# Patient Record
Sex: FEMALE | Race: WHITE | NOT HISPANIC OR LATINO | Employment: FULL TIME | URBAN - METROPOLITAN AREA
[De-identification: names, ages, dates, MRNs, and addresses within clinical notes are randomized per-mention and may not be internally consistent; named-entity substitution may affect disease eponyms.]

---

## 2016-03-31 LAB — CRC RECOMMENDATION EXT: NORMAL

## 2019-03-06 ENCOUNTER — OFFICE VISIT (OUTPATIENT)
Dept: NEUROLOGY | Facility: CLINIC | Age: 59
End: 2019-03-06
Payer: COMMERCIAL

## 2019-03-06 VITALS
WEIGHT: 229.06 LBS | DIASTOLIC BLOOD PRESSURE: 86 MMHG | BODY MASS INDEX: 42.15 KG/M2 | SYSTOLIC BLOOD PRESSURE: 148 MMHG | HEIGHT: 62 IN | HEART RATE: 81 BPM

## 2019-03-06 DIAGNOSIS — H53.8 BLURRY VISION: ICD-10-CM

## 2019-03-06 DIAGNOSIS — R20.0 NUMBNESS OF LEFT HAND: ICD-10-CM

## 2019-03-06 DIAGNOSIS — G43.109 MIGRAINE WITH AURA AND WITHOUT STATUS MIGRAINOSUS, NOT INTRACTABLE: Primary | ICD-10-CM

## 2019-03-06 PROCEDURE — 99204 OFFICE O/P NEW MOD 45 MIN: CPT | Mod: S$GLB,,, | Performed by: PSYCHIATRY & NEUROLOGY

## 2019-03-06 PROCEDURE — 3008F BODY MASS INDEX DOCD: CPT | Mod: CPTII,S$GLB,, | Performed by: PSYCHIATRY & NEUROLOGY

## 2019-03-06 PROCEDURE — 99999 PR PBB SHADOW E&M-NEW PATIENT-LVL III: CPT | Mod: PBBFAC,,, | Performed by: PSYCHIATRY & NEUROLOGY

## 2019-03-06 PROCEDURE — 99204 PR OFFICE/OUTPT VISIT, NEW, LEVL IV, 45-59 MIN: ICD-10-PCS | Mod: S$GLB,,, | Performed by: PSYCHIATRY & NEUROLOGY

## 2019-03-06 PROCEDURE — 99999 PR PBB SHADOW E&M-NEW PATIENT-LVL III: ICD-10-PCS | Mod: PBBFAC,,, | Performed by: PSYCHIATRY & NEUROLOGY

## 2019-03-06 PROCEDURE — 3008F PR BODY MASS INDEX (BMI) DOCUMENTED: ICD-10-PCS | Mod: CPTII,S$GLB,, | Performed by: PSYCHIATRY & NEUROLOGY

## 2019-03-06 RX ORDER — PROPRANOLOL HYDROCHLORIDE 20 MG/1
20 TABLET ORAL DAILY
Qty: 30 TABLET | Refills: 1 | Status: SHIPPED | OUTPATIENT
Start: 2019-03-06 | End: 2019-12-26

## 2019-03-06 RX ORDER — BUTORPHANOL TARTRATE 10 MG/ML
SPRAY NASAL
Refills: 0 | COMMUNITY
Start: 2019-02-08 | End: 2023-03-23 | Stop reason: ALTCHOICE

## 2019-03-06 RX ORDER — LORAZEPAM 1 MG/1
1 TABLET ORAL ONCE AS NEEDED
Qty: 1 TABLET | Refills: 0 | Status: SHIPPED | OUTPATIENT
Start: 2019-03-06 | End: 2022-06-16 | Stop reason: ALTCHOICE

## 2019-03-06 NOTE — LETTER
March 6, 2019      Dash Rice Jr., MD  [address] [place] [id number]           [facility] [address] [facility] [address]  [facility] [address]  Phone: [phone]  Fax: [phone]          Patient: Makenna Candelaria   MR Number: [id number]   YOB: 1960   Date of Visit: 3/6/2019       Dear Dr. Dash Rice Jr.:    Thank you for referring Makenna Candelaria to me for evaluation. Attached you will find relevant portions of my assessment and plan of care.    If you have questions, please do not hesitate to call me. I look forward to following Makenna Candelaria along with you.    Sincerely,    Christiane Myrick, DO [facility]  CC:  No Recipients    If you would like to receive this communication electronically, please contact [email or web address] or [phone] to request more information on [facility] access.    For providers and/or their staff who would like to refer a patient to [facility], please contact us through our one-stop-shop provider referral line, [facility], at [phone].    If you feel you have received this communication in error or would no longer like to receive these types of communications, please e-mail [email or web address]

## 2019-03-06 NOTE — PROGRESS NOTES
Neurology Consult Note    Chief Complaint: headaches for 14 years    HPI:   Makenna Candelaria is a 59 y.o. female with medical conditions as outlined below who is here for further evaluation of headaches. She states that she began to get headaches approximately 14 years ago. She describes them as gradual onset bifrontal throbbing pain which can get up to a 10/10 pain. She admits to associated blurry vision, photophobia, nausea and vomiting at times with the headaches. She admits to seeing black dots at times with the headache. She states for the past few months, she has been having the headaches daily and has been taking tylenol daily. She state tylenol helps somewhat with the pain. She states she is taking lexapro for anxiety and is tolerating this well. She denies suicidal or homicidal ideation. She thinks she has tried Topamax in the past for headaches and states she is unsure if it helped or not.  She states her sister also gets migraines. She denies a family history of aneurysm. She reports constant numbness in the first three digits of her left hand. She has no further complaints. She denies a history of cardiac problems.     Past Medical History:  Past Medical History:   Diagnosis Date    Breast cancer     Chronic headaches     Hypertension        Past Surgical History:  Past Surgical History:   Procedure Laterality Date    BREAST BIOPSY      BREAST LUMPECTOMY      BUNIONECTOMY       SECTION         Social History:  Social History     Socioeconomic History    Marital status: Single     Spouse name: Not on file    Number of children: Not on file    Years of education: Not on file    Highest education level: Not on file   Social Needs    Financial resource strain: Not on file    Food insecurity - worry: Not on file    Food insecurity - inability: Not on file    Transportation needs - medical: Not on file    Transportation needs - non-medical: Not on file   Occupational History    Not on  file   Tobacco Use    Smoking status: Never Smoker    Smokeless tobacco: Never Used   Substance and Sexual Activity    Alcohol use: Not on file    Drug use: Not on file    Sexual activity: Not on file   Other Topics Concern    Not on file   Social History Narrative    Not on file       Family History:  Family History   Problem Relation Age of Onset    Breast cancer Mother     Breast cancer Maternal Aunt     Breast cancer Maternal Aunt     Ovarian cancer Neg Hx     Colon cancer Neg Hx        Medications:  Current Outpatient Medications   Medication Sig Dispense Refill    butorphanol (STADOL) 10 mg/mL nasal spray ONE SPRAY IN ONE NOSTRIL EVERY 8-12 HOURS IF FIORINAL NOT WORKING FOR HEADACHE  0    ibuprofen (ADVIL,MOTRIN) 800 MG tablet       losartan-hydrochlorothiazide 50-12.5 mg (HYZAAR) 50-12.5 mg per tablet       TAMOXIFEN CITRATE (TAMOXIFEN ORAL) Take by mouth.      TIZANIDINE HCL (ZANAFLEX ORAL) Take by mouth.      HYDROCODONE/ACETAMINOPHEN (NORCO ORAL) Take by mouth.       No current facility-administered medications for this visit.        Allergies:  Review of patient's allergies indicates:   Allergen Reactions    Sulfa (sulfonamide antibiotics)        ROS:  A 12 point review of system was negative aside from pertinent positives and negatives as outlined above.    Physical Exam  Vitals:    03/06/19 1108   BP: (!) 148/86   Pulse: 81       General: well nourished, well developed  Eyes: no scleral icterus   Nose: nasal turbinates intact  Neck: supple, ROM intact  Skin: no rash or ecchymosis  Joints: no swelling or erythema  Cardiac: regular rate and rhythm  Lungs: clear to auscultation bilaterally    Neuro:  Mental status: AAO x 3, no dysarthria, no aphasia, communicating appropriately  CN: PERRL, EOMI, VFF, V1-V3 sensation intact, no facial asymmetry, hearing grossly intact, tongue midline  Motor:   RUE 5/5  RLE 5/5  LUE 5/5  LLE 5/5    Normal bulk and tone    Reflexes: 2+ throughout, toes  equivocal bilaterally  Sensory: intact to light touch throughout  Coordination: no dysmetria on FTN  Gait: steady    Prior Imaging/Labs:  No prior neuroimaging available for review      Assessment and Plan:    59 y.o. female with headaches likely 2/2 migraine with aura .    1. Migraine with aura and without status migrainosus, not intractable  - MRI Brain W WO Contrast given history of possible breast cancer  - Creatinine, serum; Future  -ESR, CRP given headache in pt with age over 50, low clinical suspicion for temporal arteritis at this time  - propranolol (INDERAL) 20 MG tablet; Take 1 tablet (20 mg total) by mouth once daily for migraine  - EKG 12-lead as baseline as starting propranolol, she was advised not to start propranolol until after EKG  - LORazepam (ATIVAN) 1 MG tablet; Take 1 tablet (1 mg total) by mouth once as needed (Take 1 tablet 20-30 min prior to MRI).  Dispense: 1 tablet; Refill: 0 -  She was advised to have someone drive her to MRI as ativan can make her tired.    Side effects of propranolol were explained to patient in detail and she was advised to notify me for worsening symptoms. She was advised to monitor her BP at home and to notify me if she feels lightheaded or if her blood pressure drops. She was advised to notify her PCP she was started on this medication.    2. Blurry vision  - Ambulatory Referral to Ophthalmology    3. Numbness of left hand likely 2/2 carpal tunnel syndrome  - EMG W/ ULTRASOUND AND NERVE CONDUCTION TEST 2 Extremities; Future      Patient was advised to notify me for worsening symptoms. I will see patient back after above workup or sooner if necessary.     Thank you for this consultation.    Christiane Myrick DO  [facility]  [address] [facility] [address]  [place] [address]  [phone]

## 2019-03-07 ENCOUNTER — HOSPITAL ENCOUNTER (OUTPATIENT)
Dept: CARDIOLOGY | Facility: HOSPITAL | Age: 59
Discharge: HOME | End: 2019-03-07
Attending: PSYCHIATRY & NEUROLOGY
Payer: COMMERCIAL

## 2019-03-07 ENCOUNTER — PROCEDURE VISIT (OUTPATIENT)
Dept: NEUROLOGY | Facility: CLINIC | Age: 59
End: 2019-03-07
Payer: COMMERCIAL

## 2019-03-07 VITALS — BODY MASS INDEX: 42.15 KG/M2 | HEIGHT: 62 IN | WEIGHT: 229.06 LBS

## 2019-03-07 DIAGNOSIS — R20.0 NUMBNESS OF LEFT HAND: ICD-10-CM

## 2019-03-07 DIAGNOSIS — G43.109 MIGRAINE WITH AURA AND WITHOUT STATUS MIGRAINOSUS, NOT INTRACTABLE: ICD-10-CM

## 2019-03-07 PROCEDURE — 95886 MUSC TEST DONE W/N TEST COMP: CPT | Mod: S$GLB,,, | Performed by: PSYCHIATRY & NEUROLOGY

## 2019-03-07 PROCEDURE — 95909 PR NERVE CONDUCTION STUDY; 5-6 STUDIES: ICD-10-PCS | Mod: S$GLB,,, | Performed by: PSYCHIATRY & NEUROLOGY

## 2019-03-07 PROCEDURE — 93010 ELECTROCARDIOGRAM REPORT: CPT | Mod: ,,, | Performed by: INTERNAL MEDICINE

## 2019-03-07 PROCEDURE — 95909 NRV CNDJ TST 5-6 STUDIES: CPT | Mod: S$GLB,,, | Performed by: PSYCHIATRY & NEUROLOGY

## 2019-03-07 PROCEDURE — 93005 ELECTROCARDIOGRAM TRACING: CPT

## 2019-03-07 PROCEDURE — 93010 EKG 12-LEAD: ICD-10-PCS | Mod: ,,, | Performed by: INTERNAL MEDICINE

## 2019-03-07 PROCEDURE — 95886 PR EMG COMPLETE, W/ NERVE CONDUCTION STUDIES, 5+ MUSCLES: ICD-10-PCS | Mod: S$GLB,,, | Performed by: PSYCHIATRY & NEUROLOGY

## 2019-03-12 ENCOUNTER — TELEPHONE (OUTPATIENT)
Dept: NEUROLOGY | Facility: CLINIC | Age: 59
End: 2019-03-12

## 2019-03-12 DIAGNOSIS — G43.009 MIGRAINE WITHOUT AURA AND WITHOUT STATUS MIGRAINOSUS, NOT INTRACTABLE: ICD-10-CM

## 2019-03-12 DIAGNOSIS — G43.009 MIGRAINE WITHOUT AURA AND WITHOUT STATUS MIGRAINOSUS, NOT INTRACTABLE: Primary | ICD-10-CM

## 2019-03-12 RX ORDER — KETOROLAC TROMETHAMINE 15.75 MG/1
SPRAY, METERED NASAL
Qty: 5 EACH | Refills: 0 | Status: SHIPPED | OUTPATIENT
Start: 2019-03-12 | End: 2019-03-12 | Stop reason: SDUPTHER

## 2019-03-12 RX ORDER — KETOROLAC TROMETHAMINE 15.75 MG/1
SPRAY, METERED NASAL
Qty: 5 EACH | Refills: 0 | Status: SHIPPED | OUTPATIENT
Start: 2019-03-12 | End: 2019-05-27 | Stop reason: SDUPTHER

## 2019-03-12 NOTE — TELEPHONE ENCOUNTER
Sent to Dr. Martinez, she wants to know if she can take something else because the propranolol is not helping I explained to her she has to give the medication time to work. She mentioned state oil.        ----- Message from Rachelle Tejada sent at 3/12/2019 11:17 AM CDT -----  Contact: self  Pt calling to speak to a nurse regarding bad headache. [phone]

## 2019-03-12 NOTE — TELEPHONE ENCOUNTER
----- Message from Rachelle Tejada sent at 3/12/2019 11:17 AM CDT -----  Contact: self  Pt calling to speak to a nurse regarding bad headache. [phone]

## 2019-03-12 NOTE — TELEPHONE ENCOUNTER
Needs to be sent to [facility].          ----- Message from Jeni Alonso sent at 3/12/2019  1:56 PM CDT -----  Contact: [facility]/ [phone]  Prescription ketorolac (SPRIX) 15.75 mg/[facility]  Is there something else you can call in for patient?  Thank you

## 2019-03-12 NOTE — TELEPHONE ENCOUNTER
Spoke to patient. She has an 8/10 typical migraine. She has tried triptans in the past and state they do not help. She would not like to retry this. I have prescribed her sprix spray. I have advised her to spray in one nostril every 6 hours for severe migraine. She was advised not to use more than 4 times in a day or more than 3 times a week. If she is unable to get this today, I have advised her to take ibuprofen 800mg as needed up to three times a day. She was advised not to do this more than 3 times a week and she was advised not to take ibuprofen and sprix spray in the same day as they work in a similar way. If her headache does not improve, she was advised to try to get an injection with her PCP or go to ED for IV medications. She is in agreement with this plan. Will have staff call her to make a sooner follow up appointment.    Christiane Myrick DO

## 2019-03-22 ENCOUNTER — TELEPHONE (OUTPATIENT)
Dept: NEUROLOGY | Facility: CLINIC | Age: 59
End: 2019-03-22

## 2019-03-22 NOTE — TELEPHONE ENCOUNTER
Patient stopped taking propranolol because she gained weight. Made a sooner appointment.        ----- Message from Elizabeth Archibald sent at 3/22/2019 12:04 PM CDT -----  Contact: pt  Name of Who is Calling: pt      What is the request in detail: pt wants to discuss medications. Call pt      Can the clinic reply by [facility]: no      What Number to Call Back if not in [facility]: [phone]

## 2019-03-28 ENCOUNTER — TELEPHONE (OUTPATIENT)
Dept: OPTOMETRY | Facility: CLINIC | Age: 59
End: 2019-03-28

## 2019-04-03 ENCOUNTER — TELEPHONE (OUTPATIENT)
Dept: NEUROLOGY | Facility: CLINIC | Age: 59
End: 2019-04-03

## 2019-04-03 NOTE — TELEPHONE ENCOUNTER
Returned call LVM to call back.      ----- Message from Erin Grullon sent at 4/3/2019  3:24 PM CDT -----  Contact: Self: [phone]  Patient has a call regarding Medication prescribed to her. Please call to advise, Thank you

## 2019-04-11 DIAGNOSIS — G43.109 MIGRAINE WITH AURA AND WITHOUT STATUS MIGRAINOSUS, NOT INTRACTABLE: ICD-10-CM

## 2019-04-15 ENCOUNTER — TELEPHONE (OUTPATIENT)
Dept: NEUROLOGY | Facility: CLINIC | Age: 59
End: 2019-04-15

## 2019-04-15 ENCOUNTER — OFFICE VISIT (OUTPATIENT)
Dept: NEUROLOGY | Facility: CLINIC | Age: 59
End: 2019-04-15
Payer: COMMERCIAL

## 2019-04-15 VITALS
DIASTOLIC BLOOD PRESSURE: 74 MMHG | HEIGHT: 62 IN | HEART RATE: 84 BPM | SYSTOLIC BLOOD PRESSURE: 160 MMHG | WEIGHT: 233 LBS | BODY MASS INDEX: 42.88 KG/M2

## 2019-04-15 DIAGNOSIS — G43.109 MIGRAINE WITH AURA AND WITHOUT STATUS MIGRAINOSUS, NOT INTRACTABLE: Primary | ICD-10-CM

## 2019-04-15 PROCEDURE — 3008F PR BODY MASS INDEX (BMI) DOCUMENTED: ICD-10-PCS | Mod: CPTII,S$GLB,, | Performed by: PSYCHIATRY & NEUROLOGY

## 2019-04-15 PROCEDURE — 99999 PR PBB SHADOW E&M-EST. PATIENT-LVL III: ICD-10-PCS | Mod: PBBFAC,,, | Performed by: PSYCHIATRY & NEUROLOGY

## 2019-04-15 PROCEDURE — 99214 OFFICE O/P EST MOD 30 MIN: CPT | Mod: S$GLB,,, | Performed by: PSYCHIATRY & NEUROLOGY

## 2019-04-15 PROCEDURE — 3008F BODY MASS INDEX DOCD: CPT | Mod: CPTII,S$GLB,, | Performed by: PSYCHIATRY & NEUROLOGY

## 2019-04-15 PROCEDURE — 99999 PR PBB SHADOW E&M-EST. PATIENT-LVL III: CPT | Mod: PBBFAC,,, | Performed by: PSYCHIATRY & NEUROLOGY

## 2019-04-15 PROCEDURE — 99214 PR OFFICE/OUTPT VISIT, EST, LEVL IV, 30-39 MIN: ICD-10-PCS | Mod: S$GLB,,, | Performed by: PSYCHIATRY & NEUROLOGY

## 2019-04-15 RX ORDER — TOPIRAMATE 25 MG/1
TABLET ORAL
Qty: 60 TABLET | Refills: 1 | Status: SHIPPED | OUTPATIENT
Start: 2019-04-15 | End: 2019-05-27 | Stop reason: SDUPTHER

## 2019-04-15 NOTE — PROGRESS NOTES
Neurology Follow Up Note    Chief Complaint: follow up for migraines    Interval History:  Since last visit, patient reports having her typical migraines a few times a week. She states they can get up to a 10/10 pain. She tried propranolol for migraines, but stopped this as she felt she was gaining weight on this medication. She has not had her MRI as of yet due to cost. She has no further complaints.    Initial Visit 3/6/19:  Makenna Candelaria is a 59 y.o. female with medical conditions as outlined below who is here for further evaluation of headaches. She states that she began to get headaches approximately 14 years ago. She describes them as gradual onset bifrontal throbbing pain which can get up to a 10/10 pain. She admits to associated blurry vision, photophobia, nausea and vomiting at times with the headaches. She admits to seeing black dots at times with the headache. She states for the past few months, she has been having the headaches daily and has been taking tylenol daily. She state tylenol helps somewhat with the pain. She states she is taking lexapro for anxiety and is tolerating this well. She denies suicidal or homicidal ideation. She thinks she has tried Topamax in the past for headaches and states she is unsure if it helped or not.  She states her sister also gets migraines. She denies a family history of aneurysm. She reports constant numbness in the first three digits of her left hand. She has no further complaints. She denies a history of cardiac problems.        Past Medical History:  Past Medical History:   Diagnosis Date    Breast cancer     Chronic headaches     Hypertension        Past Surgical History:  Past Surgical History:   Procedure Laterality Date    BREAST BIOPSY      BREAST LUMPECTOMY      BUNIONECTOMY       SECTION         Social History:  Social History     Socioeconomic History    Marital status: Single     Spouse name: Not on file    Number of children: Not on  file    Years of education: Not on file    Highest education level: Not on file   Occupational History    Not on file   Social Needs    Financial resource strain: Not on file    Food insecurity:     Worry: Not on file     Inability: Not on file    Transportation needs:     Medical: Not on file     Non-medical: Not on file   Tobacco Use    Smoking status: Never Smoker    Smokeless tobacco: Never Used   Substance and Sexual Activity    Alcohol use: Not on file    Drug use: Not on file    Sexual activity: Not on file   Lifestyle    Physical activity:     Days per week: Not on file     Minutes per session: Not on file    Stress: Not on file   Relationships    Social connections:     Talks on phone: Not on file     Gets together: Not on file     Attends Alevism service: Not on file     Active member of club or organization: Not on file     Attends meetings of clubs or organizations: Not on file     Relationship status: Not on file   Other Topics Concern    Not on file   Social History Narrative    Not on file       Family History:  Family History   Problem Relation Age of Onset    Breast cancer Mother     Breast cancer Maternal Aunt     Breast cancer Maternal Aunt     Ovarian cancer Neg Hx     Colon cancer Neg Hx        Medications:  Current Outpatient Medications   Medication Sig Dispense Refill    butorphanol (STADOL) 10 mg/mL nasal spray ONE SPRAY IN ONE NOSTRIL EVERY 8-12 HOURS IF FIORINAL NOT WORKING FOR HEADACHE  0    HYDROCODONE/ACETAMINOPHEN (NORCO ORAL) Take by mouth.      ketorolac (SPRIX) 15.75 mg/spray Spry Spray in 1 nostril every 6 hours as needed for severe migraine. Do not exceed 4 times a day or more than 3 times a week 5 each 0    losartan-hydrochlorothiazide 50-12.5 mg (HYZAAR) 50-12.5 mg per tablet       propranolol (INDERAL) 20 MG tablet Take 1 tablet (20 mg total) by mouth once daily. 30 tablet 1    TAMOXIFEN CITRATE (TAMOXIFEN ORAL) Take by mouth.      TIZANIDINE HCL  (ZANAFLEX ORAL) Take by mouth.      fremanezumab-vfrm 225 mg/1.5 mL Syrg Inject 225 mg into the skin every 28 days. 1.5 mL 3    LORazepam (ATIVAN) 1 MG tablet Take 1 tablet (1 mg total) by mouth once as needed (Take 1 tablet 20-30 min prior to MRI). 1 tablet 0     No current facility-administered medications for this visit.        Allergies:  Review of patient's allergies indicates:   Allergen Reactions    Sulfa (sulfonamide antibiotics)        ROS:  A 12 point review of system was negative aside from pertinent positives and negatives as outlined above.    Physical Exam  Vitals:    04/15/19 1416   BP: (!) 160/74   Pulse: 84       General: well nourished, well developed  Eyes: no scleral icterus   Nose: nasal turbinates intact  Neck: supple, ROM intact  Skin: no rash or ecchymosis  Joints: no swelling or erythema  Cardiac: regular rate and rhythm  Lungs: clear to auscultation bilaterally    Neuro:  Mental status: AAO x 3, no dysarthria, no aphasia, communicating appropriately  CN: PERRL, EOMI, VFF, V1-V3 sensation intact, no facial asymmetry, hearing grossly intact, tongue midline  Motor:   RUE 5/5  RLE 5/5  LUE 5/5  LLE 5/5    Normal bulk and tone    Reflexes: 2+ throughout, toes equivocal bilaterally  Sensory: intact to light touch throughout  Coordination: no dysmetria on FTN  Gait: steady    Prior Imaging/Labs:  No recent neuroimaging      Assessment and Plan:    59 y.o. female with migraines    1. Migraine with aura and without status migrainosus, not intractable    - We discussed trying topamax to see if this helps. She was advised to take 1 tab at bedtime for a week and if tolerating well increase to 2 tabs at bedtime. She denies a history of kidney stones  She was made aware of side effects of topamax and was advised to notify me if she experiences any. If she does not notice a difference in her headaches in 3 weeks, we discussed trying fremanezumab(ajovy) injections. She was made aware of side effects of  this medication as well and was advised to notify me if she experiences any.  - fremanezumab-vfrm 225 mg/1.5 mL Syrg; Inject 225 mg into the skin every 28 days.  Dispense: 1.5 mL; Refill: 3        Patient was advised to notify me for worsening symptoms. I will see patient back in 6 weeks or sooner if necessary.     Christiane Myrick DO  [facility]  [address] [facility] [address]  [place] [address]  [phone]

## 2019-04-15 NOTE — TELEPHONE ENCOUNTER
Made an appt.        ----- Message from Rachael pierooliverio sent at 4/12/2019  4:46 PM CDT -----  Contact: self  Type: Patient Call Back    Who called:pt     What is the request in detail: Pt asking for a call back regarding her medications. Pt declined to leave details.     Can the clinic reply by [facility]? No     Would the patient rather a call back or a response via [facility]? Call back     Best call back number:[phone]

## 2019-04-17 ENCOUNTER — TELEPHONE (OUTPATIENT)
Dept: PHARMACY | Facility: CLINIC | Age: 59
End: 2019-04-17

## 2019-04-18 NOTE — TELEPHONE ENCOUNTER
Matty prior authorization has been approved through 04/17/2020 . Patient's insurance requires the patient to fill through [facility]. Please send prescription to [facility], which has been added to the patients EPIC profile. Patient has been notified and provided with the necessary info to call and schedule a delivery.    To complete this in EPIC, the original order MUST be discontinued and re-typed as a new prescription with the updated pharmacy listed. Clicking reorder will continue to route the rx to [facility] even if the pharmacy is changed. Please opt the patient out of [facility] when the BPA is fired.    DOCUMENTATION ONLY:  Financial Assistance for Ajovy approved.  Source: [facility] Copay Card  Bin: [id number]  PCN: CN  ID: [id number]  Group: [id number]  Copay: $0     -ARR

## 2019-04-22 DIAGNOSIS — G43.109 MIGRAINE WITH AURA AND WITHOUT STATUS MIGRAINOSUS, NOT INTRACTABLE: ICD-10-CM

## 2019-04-22 RX ORDER — PROPRANOLOL HYDROCHLORIDE 20 MG/1
20 TABLET ORAL DAILY
Qty: 30 TABLET | Refills: 1 | Status: CANCELLED | OUTPATIENT
Start: 2019-04-22

## 2019-04-26 ENCOUNTER — TELEPHONE (OUTPATIENT)
Dept: NEUROLOGY | Facility: CLINIC | Age: 59
End: 2019-04-26

## 2019-04-26 NOTE — TELEPHONE ENCOUNTER
Called in fremanezumab-vfrm 225 mg/1.5 mL Syrg to [facility].        ----- Message from Kimi Granados sent at 4/26/2019  1:23 PM CDT -----  Contact: self  [phone]  Type: Patient Call Back    Who called: Pt    What is the request in detail:      Pt requesting to speak to you regarding the script for her injection  For her headaches. [facility] can order it for her is that can get the script sent to them by 2:00 today    [facility] in [place], [phone]    I red flagged this because she needs her meds filled asap    Thanks........Beatriz

## 2019-05-08 DIAGNOSIS — G43.109 MIGRAINE WITH AURA AND WITHOUT STATUS MIGRAINOSUS, NOT INTRACTABLE: ICD-10-CM

## 2019-05-09 DIAGNOSIS — G43.109 MIGRAINE WITH AURA AND WITHOUT STATUS MIGRAINOSUS, NOT INTRACTABLE: ICD-10-CM

## 2019-05-26 NOTE — PROCEDURES
Neurology EMG/NCS Note    Patient presents for EMG/NCS for left hand paresthesias.    Physical Exam  Deltoid R 5/5 L 5/5   Biceps R 5/5 L 5/5   Triceps R 5/5 L 5/5   Wrist flexion R 5/5 L 5/5   Wrist extension R 5/5 L 5/5   Finger abduction  R 5/5 L 5/5   Thumb abduction R 5/5 L 5/5     Hip flexion R 5/5 L 5/5   Knee extension R 5/5 L 5/5   Knee flexion R 5/5 L 5/5   Foot dorsiflexion R 5/5 L 5/5   Foot plantar flexion R 5/5 L 5/5   Foot inversion R 5/5 L 5/5   Foot eversion R 5/5 L 5/5       EMG was performed. Please see scanned EMG/NCS report for further details.    Conclusion:   This is an abnormal EMG/NCS study  1) The absent left median sensory response and severely reduced left median motor CMAP amplitudes could be secondary to a severe median neuropathy at the wrist on the left (severe left carpal tunnel syndrome) vs technical difficulty. This is a nondiagnostic study.     Clinical Impression:  Keeping clinical picture in mind, findings are atypical and further studies, i.e. MRI wrist would have to be considered to clarify diagnosis.    Patient may have some milder degree of carpal tunnel syndrome, but given lack of weakness on exam and severity of findings on NCS, would recommend further studies to evaluate for carpal tunnel syndrome    We discussed a referral to orthopaedics for consideration of steroid injections to see if this helps with symptoms, however, patient would like to hold off at this time.    Christiane Myrick DO

## 2019-05-27 ENCOUNTER — OFFICE VISIT (OUTPATIENT)
Dept: NEUROLOGY | Facility: CLINIC | Age: 59
End: 2019-05-27
Payer: COMMERCIAL

## 2019-05-27 VITALS
DIASTOLIC BLOOD PRESSURE: 81 MMHG | BODY MASS INDEX: 42.03 KG/M2 | HEIGHT: 62 IN | WEIGHT: 228.38 LBS | HEART RATE: 75 BPM | SYSTOLIC BLOOD PRESSURE: 131 MMHG

## 2019-05-27 DIAGNOSIS — G43.109 MIGRAINE WITH AURA AND WITHOUT STATUS MIGRAINOSUS, NOT INTRACTABLE: ICD-10-CM

## 2019-05-27 DIAGNOSIS — G43.009 MIGRAINE WITHOUT AURA AND WITHOUT STATUS MIGRAINOSUS, NOT INTRACTABLE: ICD-10-CM

## 2019-05-27 PROBLEM — G43.909 MIGRAINES: Status: ACTIVE | Noted: 2019-05-27

## 2019-05-27 PROCEDURE — 99999 PR PBB SHADOW E&M-EST. PATIENT-LVL III: ICD-10-PCS | Mod: PBBFAC,,, | Performed by: PSYCHIATRY & NEUROLOGY

## 2019-05-27 PROCEDURE — 99214 OFFICE O/P EST MOD 30 MIN: CPT | Mod: S$GLB,,, | Performed by: PSYCHIATRY & NEUROLOGY

## 2019-05-27 PROCEDURE — 99214 PR OFFICE/OUTPT VISIT, EST, LEVL IV, 30-39 MIN: ICD-10-PCS | Mod: S$GLB,,, | Performed by: PSYCHIATRY & NEUROLOGY

## 2019-05-27 PROCEDURE — 99999 PR PBB SHADOW E&M-EST. PATIENT-LVL III: CPT | Mod: PBBFAC,,, | Performed by: PSYCHIATRY & NEUROLOGY

## 2019-05-27 PROCEDURE — 3008F PR BODY MASS INDEX (BMI) DOCUMENTED: ICD-10-PCS | Mod: CPTII,S$GLB,, | Performed by: PSYCHIATRY & NEUROLOGY

## 2019-05-27 PROCEDURE — 3008F BODY MASS INDEX DOCD: CPT | Mod: CPTII,S$GLB,, | Performed by: PSYCHIATRY & NEUROLOGY

## 2019-05-27 RX ORDER — TOPIRAMATE 25 MG/1
TABLET ORAL
Qty: 90 TABLET | Refills: 1 | Status: SHIPPED | OUTPATIENT
Start: 2019-05-27 | End: 2019-07-23 | Stop reason: SDUPTHER

## 2019-05-27 RX ORDER — KETOROLAC TROMETHAMINE 15.75 MG/1
SPRAY, METERED NASAL
Qty: 5 EACH | Refills: 0 | Status: SHIPPED | OUTPATIENT
Start: 2019-05-27 | End: 2019-07-03 | Stop reason: SDUPTHER

## 2019-05-27 NOTE — PROGRESS NOTES
Neurology Follow Up Note    Chief Complaint: migraines and left hand paresthesias    HPI:   Makenna Candelaria is a 59 y.o. female with migraines who presents for follow up appointment. She reports that since last visit, her headaches have improved with the ajovy injections. She states after the injection, she did not have a headache for 2 weeks, however, for the past two weeks she has been getting headaches daily. She describes them as gradual onset throbbing holocephalic headaches associated with photophobia and phonophobia. These headaches are usually a 6/10. She states topamax has helped her nighttime headaches, but she still has headaches during the day. She is tolerating topamax well. She states she has left hand paresthesias, but is tolerating it well. I have advised her to get an MRI of the left wrist to further evaluate for carpal tunnel syndrome given inconclusive EMG, however, she states she would like to hold off on this at this time. She has no further complaints.     Past Medical History:  Past Medical History:   Diagnosis Date    Breast cancer     Chronic headaches     Hypertension        Past Surgical History:  Past Surgical History:   Procedure Laterality Date    BREAST BIOPSY      BREAST LUMPECTOMY      BUNIONECTOMY       SECTION         Social History:  Social History     Socioeconomic History    Marital status: Single     Spouse name: Not on file    Number of children: Not on file    Years of education: Not on file    Highest education level: Not on file   Occupational History    Not on file   Social Needs    Financial resource strain: Not on file    Food insecurity:     Worry: Not on file     Inability: Not on file    Transportation needs:     Medical: Not on file     Non-medical: Not on file   Tobacco Use    Smoking status: Never Smoker    Smokeless tobacco: Never Used   Substance and Sexual Activity    Alcohol use: Not on file    Drug use: Not on file    Sexual  activity: Not on file   Lifestyle    Physical activity:     Days per week: Not on file     Minutes per session: Not on file    Stress: Not on file   Relationships    Social connections:     Talks on phone: Not on file     Gets together: Not on file     Attends Confucianism service: Not on file     Active member of club or organization: Not on file     Attends meetings of clubs or organizations: Not on file     Relationship status: Not on file   Other Topics Concern    Not on file   Social History Narrative    Not on file       Family History:  Family History   Problem Relation Age of Onset    Breast cancer Mother     Breast cancer Maternal Aunt     Breast cancer Maternal Aunt     Ovarian cancer Neg Hx     Colon cancer Neg Hx        Medications:  Current Outpatient Medications   Medication Sig Dispense Refill    butorphanol (STADOL) 10 mg/mL nasal spray ONE SPRAY IN ONE NOSTRIL EVERY 8-12 HOURS IF FIORINAL NOT WORKING FOR HEADACHE  0    fremanezumab-vfrm 225 mg/1.5 mL Syrg Inject 225 mg into the skin every 28 days. 1.5 mL 3    losartan-hydrochlorothiazide 50-12.5 mg (HYZAAR) 50-12.5 mg per tablet       TAMOXIFEN CITRATE (TAMOXIFEN ORAL) Take by mouth.      topiramate (TOPAMAX) 25 MG tablet Take 1 tab in the morning and 2 tabs at bedtime 90 tablet 1    ketorolac (SPRIX) 15.75 mg/spray Spry Spray in 1 nostril every 6 hours as needed for severe migraine. Do not exceed 4 times a day or more than 3 times a week 5 each 0    LORazepam (ATIVAN) 1 MG tablet Take 1 tablet (1 mg total) by mouth once as needed (Take 1 tablet 20-30 min prior to MRI). 1 tablet 0    propranolol (INDERAL) 20 MG tablet Take 1 tablet (20 mg total) by mouth once daily. 30 tablet 1     No current facility-administered medications for this visit.        Allergies:  Review of patient's allergies indicates:   Allergen Reactions    Sulfa (sulfonamide antibiotics)        ROS:  A 12 point review of system was negative aside from pertinent  positives and negatives as outlined above.    Physical Exam  Vitals:    05/27/19 1424   BP: 131/81   Pulse: 75       General: well nourished, well developed  Eyes: no scleral icterus   Nose: nasal turbinates intact  Neck: supple, ROM intact  Skin: no rash or ecchymosis  Joints: no swelling or erythema  Cardiac: regular rate and rhythm  Lungs: clear to auscultation bilaterally    Neuro:  Mental status: AAO x 3, no dysarthria, no aphasia, communicating appropriately  CN: PERRL, EOMI, VFF, V1-V3 sensation intact, no facial asymmetry, hearing grossly intact, tongue midline  Motor:   RUE 5/5  RLE 5/5  LUE 5/5  LLE 5/5    Normal bulk and tone    Reflexes: 2+ throughout, toes equivocal bilaterally  Sensory: intact to light touch throughout  Coordination: no dysmetria on FTN  Gait: steady    Prior Imaging/Labs:  No recent neuroimaging      Assessment and Plan:    59 y.o. female with migraines    1. Migraine with aura and without status migrainosus, not intractable  Increase topamax to 25mg in the morning and 50mg at bedtime  She was advised to notify me if she experiences side effects to topamax with this increase  - ketorolac (SPRIX) 15.75 mg/spray Spry; Spray in 1 nostril every 6 hours as needed for severe migraine. Do not exceed 4 times a day or more than 3 times a week  Dispense: 5 each; Refill: 0. She was advised not to take ibuprofen on the days she uses sprix spray as they work in a similar manner. She denies stomach problems or problems with bleeding.  She was advised to notify me if she experiences side effects to sprix spray  C/w ajovy injections once a month  MRI brain w/wo contrast            Patient was advised to notify me for worsening symptoms. I will see patient back in 2 months or sooner if necessary.     Christiane Myrick DO  [facility]  [address] [facility] [address]  [place] [address] [phone]

## 2019-07-03 DIAGNOSIS — G43.009 MIGRAINE WITHOUT AURA AND WITHOUT STATUS MIGRAINOSUS, NOT INTRACTABLE: ICD-10-CM

## 2019-07-03 RX ORDER — KETOROLAC TROMETHAMINE 15.75 MG/1
SPRAY, METERED NASAL
Qty: 5 EACH | Refills: 0 | Status: SHIPPED | OUTPATIENT
Start: 2019-07-03 | End: 2019-07-31 | Stop reason: SDUPTHER

## 2019-07-05 ENCOUNTER — TELEPHONE (OUTPATIENT)
Dept: NEUROLOGY | Facility: CLINIC | Age: 59
End: 2019-07-05

## 2019-07-05 NOTE — TELEPHONE ENCOUNTER
Spoke to patient over the phone. She states she had a gradual onset right sided throbbing headache yesterday that got up to a 10/10 pain. She states she had associated photophobia, phonophobia, and nausea but no vomiting. She denies having a fever. She states her headache is now a 7/10 and she is functional. She has not tried anything over the counter and is receiving her sprix tomorrow. I have advised her to take ibuprofen 800mg now and repeat in 8 hours if necessary. She was advised not to take ibuprofen tomorrow if she is going to use the sprix spray. She was advised to go to the ED if she has any significant worsening in her headache and she is in agreement with this plan. I will have staff call her to make her a sooner follow up.    Christiane Myrick DO

## 2019-07-05 NOTE — TELEPHONE ENCOUNTER
----- Message from Sonia Rodrigez sent at 7/5/2019  9:18 AM CDT -----  Contact: Makenna [phone]  Type: Patient Call Back    Who called:Makenna    What is the request in detail: The patient is requesting a call back from the staff. She is having really bad headaches today    Can the clinic reply by [facility]?no    Would the patient rather a call back or a response via [facility]? Call back    Best call back number:[phone]    It started yesterday, she took her meds as she always does, she laid down with a cool rag and took her meds again this morning. It's eased up and she's functional but it's still there. This month has been very stressful for her. The sprix is supposed to be delivered tomorrow

## 2019-07-23 DIAGNOSIS — G43.109 MIGRAINE WITH AURA AND WITHOUT STATUS MIGRAINOSUS, NOT INTRACTABLE: ICD-10-CM

## 2019-07-23 RX ORDER — TOPIRAMATE 25 MG/1
TABLET ORAL
Qty: 90 TABLET | Refills: 1 | Status: SHIPPED | OUTPATIENT
Start: 2019-07-23 | End: 2019-07-31

## 2019-07-26 ENCOUNTER — TELEPHONE (OUTPATIENT)
Dept: NEUROLOGY | Facility: CLINIC | Age: 59
End: 2019-07-26

## 2019-07-26 NOTE — TELEPHONE ENCOUNTER
Returned call LVM to call back.        ----- Message from Scott Acevedo sent at 7/26/2019  8:19 AM CDT -----  Contact: Self  Type: Patient Call Back    Who called:Self    What is the request in detail: Pateint would like to discuss her MRI test ordered    Can the clinic reply by [facility]?no    Would the patient rather a call back or a response via [facility]? call    Best call back number: [phone]

## 2019-07-31 ENCOUNTER — OFFICE VISIT (OUTPATIENT)
Dept: NEUROLOGY | Facility: CLINIC | Age: 59
End: 2019-07-31
Payer: COMMERCIAL

## 2019-07-31 VITALS
HEART RATE: 79 BPM | WEIGHT: 225.75 LBS | HEIGHT: 62 IN | BODY MASS INDEX: 41.54 KG/M2 | SYSTOLIC BLOOD PRESSURE: 146 MMHG | DIASTOLIC BLOOD PRESSURE: 88 MMHG

## 2019-07-31 DIAGNOSIS — G43.009 MIGRAINE WITHOUT AURA AND WITHOUT STATUS MIGRAINOSUS, NOT INTRACTABLE: ICD-10-CM

## 2019-07-31 DIAGNOSIS — G43.109 MIGRAINE WITH AURA AND WITHOUT STATUS MIGRAINOSUS, NOT INTRACTABLE: Primary | ICD-10-CM

## 2019-07-31 DIAGNOSIS — I83.813 VARICOSE VEINS OF BOTH LOWER EXTREMITIES WITH PAIN: ICD-10-CM

## 2019-07-31 PROCEDURE — 3008F PR BODY MASS INDEX (BMI) DOCUMENTED: ICD-10-PCS | Mod: CPTII,S$GLB,, | Performed by: PSYCHIATRY & NEUROLOGY

## 2019-07-31 PROCEDURE — 99214 PR OFFICE/OUTPT VISIT, EST, LEVL IV, 30-39 MIN: ICD-10-PCS | Mod: S$GLB,,, | Performed by: PSYCHIATRY & NEUROLOGY

## 2019-07-31 PROCEDURE — 99214 OFFICE O/P EST MOD 30 MIN: CPT | Mod: S$GLB,,, | Performed by: PSYCHIATRY & NEUROLOGY

## 2019-07-31 PROCEDURE — 99999 PR PBB SHADOW E&M-EST. PATIENT-LVL III: ICD-10-PCS | Mod: PBBFAC,,, | Performed by: PSYCHIATRY & NEUROLOGY

## 2019-07-31 PROCEDURE — 3008F BODY MASS INDEX DOCD: CPT | Mod: CPTII,S$GLB,, | Performed by: PSYCHIATRY & NEUROLOGY

## 2019-07-31 PROCEDURE — 99999 PR PBB SHADOW E&M-EST. PATIENT-LVL III: CPT | Mod: PBBFAC,,, | Performed by: PSYCHIATRY & NEUROLOGY

## 2019-07-31 RX ORDER — TOPIRAMATE 50 MG/1
50 TABLET, FILM COATED ORAL 2 TIMES DAILY
Qty: 60 TABLET | Refills: 1 | Status: SHIPPED | OUTPATIENT
Start: 2019-07-31 | End: 2019-09-24

## 2019-07-31 RX ORDER — ESCITALOPRAM OXALATE 10 MG/1
10 TABLET ORAL 2 TIMES DAILY
COMMUNITY
Start: 2019-07-02 | End: 2022-06-30 | Stop reason: SDUPTHER

## 2019-07-31 RX ORDER — KETOROLAC TROMETHAMINE 15.75 MG/1
SPRAY, METERED NASAL
Qty: 5 EACH | Refills: 5 | Status: SHIPPED | OUTPATIENT
Start: 2019-07-31 | End: 2019-08-12 | Stop reason: SDUPTHER

## 2019-07-31 RX ORDER — HYDROCHLOROTHIAZIDE 25 MG/1
TABLET ORAL
COMMUNITY
Start: 2019-07-02 | End: 2022-06-16 | Stop reason: SDUPTHER

## 2019-07-31 RX ORDER — LOSARTAN POTASSIUM 100 MG/1
TABLET ORAL
COMMUNITY
Start: 2019-07-02 | End: 2022-06-16 | Stop reason: SDUPTHER

## 2019-08-01 ENCOUNTER — TELEPHONE (OUTPATIENT)
Dept: NEUROLOGY | Facility: CLINIC | Age: 59
End: 2019-08-01

## 2019-08-01 DIAGNOSIS — G43.009 MIGRAINE WITHOUT AURA AND WITHOUT STATUS MIGRAINOSUS, NOT INTRACTABLE: Primary | ICD-10-CM

## 2019-08-01 NOTE — TELEPHONE ENCOUNTER
Called patient and notified her of MRI brain results. MRI brain was found to be normal. It shows right maxillary sinus polyps and mild bilateral ethmoid mucosal thickening, however, patient does not have symptoms of sinusitis. If she develops nasal congestion, she was advised to discuss this further with her PCP. All questions were answered.    Christiane Myrick DO

## 2019-08-12 ENCOUNTER — TELEPHONE (OUTPATIENT)
Dept: NEUROLOGY | Facility: CLINIC | Age: 59
End: 2019-08-12

## 2019-08-12 DIAGNOSIS — G43.009 MIGRAINE WITHOUT AURA AND WITHOUT STATUS MIGRAINOSUS, NOT INTRACTABLE: ICD-10-CM

## 2019-08-12 RX ORDER — KETOROLAC TROMETHAMINE 15.75 MG/1
SPRAY, METERED NASAL
Qty: 5 EACH | Refills: 5 | Status: SHIPPED | OUTPATIENT
Start: 2019-08-12 | End: 2020-03-19 | Stop reason: SDUPTHER

## 2019-08-12 NOTE — TELEPHONE ENCOUNTER
----- Message from Elizabeth Archibald sent at 8/12/2019  2:05 PM CDT -----  Contact: pt  Type: RX Refill Request    Who Called:         Refill or New Rx:fremanezumab-vfrm (AJOVY) 225 mg/1.5 mL injection     RX Name and Strength:    How is the patient currently taking it? (ex. 1XDay):    Is this a 30 day or 90 day RX:    Preferred Pharmacy with phone number:[facility]    Local or Mail Order:    Ordering Provider:    Would the patient rather a call back or a response via [facility]? Call back    Best Call Back Number:[phone]    Additional Information:

## 2019-08-12 NOTE — TELEPHONE ENCOUNTER
----- Message from Christiane Myrick DO sent at 8/12/2019  5:24 PM CDT -----  Contact: Self [phone]  I sent the ajovy to "[facility] and sprix to [facility]. Can you let patient know, thanks.  ----- Message -----  From: Rufina Mccullough MA  Sent: 8/12/2019   5:06 PM  To: Christiane Myrick DO        ----- Message -----  From: Zahraa Ramsay  Sent: 8/12/2019   4:35 PM  To: [facility] Staff    Type: Patient Call Back    Who called:Self    What is the request in detail:pt is calling in regards to the medication being sent to the incorrect pharmacies.fremanezumab-vfrm (AJOVY) 225 mg/1.5 mL injection & ketorolac (SPRIX) 15.75 mg/spray Spry. fremanezumab-vfrm (AJOVY) 225 mg/1.5 mL injection is supposed to go to [facility] and not [facility] and [facility] is trying to charge her for the medication and it was not supposed to go there. ketorolac (SPRIX) 15.75 mg/spray Spry is supposed to go to [facility]    Can the clinic reply by [facility]? Call back    Would the patient rather a call back or a response via [facility]? Call back    Best call back number:[phone]      Patient notified

## 2019-09-03 DIAGNOSIS — G43.109 MIGRAINE WITH AURA AND WITHOUT STATUS MIGRAINOSUS, NOT INTRACTABLE: ICD-10-CM

## 2019-09-03 RX ORDER — LORAZEPAM 1 MG/1
TABLET ORAL
Qty: 1 TABLET | OUTPATIENT
Start: 2019-09-03

## 2019-09-06 RX ORDER — PROPRANOLOL HYDROCHLORIDE 20 MG/1
TABLET ORAL
Qty: 30 TABLET | Refills: 1 | OUTPATIENT
Start: 2019-09-06

## 2019-09-24 DIAGNOSIS — G43.109 MIGRAINE WITH AURA AND WITHOUT STATUS MIGRAINOSUS, NOT INTRACTABLE: ICD-10-CM

## 2019-09-24 RX ORDER — TOPIRAMATE 50 MG/1
50 TABLET, FILM COATED ORAL 2 TIMES DAILY
Qty: 60 TABLET | Refills: 3 | Status: SHIPPED | OUTPATIENT
Start: 2019-09-24 | End: 2019-11-25

## 2019-11-20 ENCOUNTER — TELEPHONE (OUTPATIENT)
Dept: NEUROLOGY | Facility: CLINIC | Age: 59
End: 2019-11-20

## 2019-11-20 NOTE — TELEPHONE ENCOUNTER
----- Message from Elizabeth Watts sent at 11/20/2019  1:35 PM CST -----  Contact: self / [phone]  Needs a call back for labs. Please advise       Booked appointment

## 2019-11-25 ENCOUNTER — TELEPHONE (OUTPATIENT)
Dept: NEUROLOGY | Facility: CLINIC | Age: 59
End: 2019-11-25

## 2019-11-25 RX ORDER — TOPIRAMATE 25 MG/1
50 TABLET ORAL 2 TIMES DAILY
Qty: 120 TABLET | Refills: 0 | Status: SHIPPED | OUTPATIENT
Start: 2019-11-25 | End: 2019-12-23

## 2019-11-25 NOTE — TELEPHONE ENCOUNTER
Patient wanted to know where she had her MRI done at told her DIS.        ----- Message from aRch Hobson sent at 11/25/2019  9:24 AM CST -----  Contact: [phone]/self   Patient is requesting to speak with nurse regarding a personal matter. Please advise.

## 2019-12-20 DIAGNOSIS — G43.009 MIGRAINE WITHOUT AURA AND WITHOUT STATUS MIGRAINOSUS, NOT INTRACTABLE: ICD-10-CM

## 2019-12-23 DIAGNOSIS — G43.009 MIGRAINE WITHOUT AURA AND WITHOUT STATUS MIGRAINOSUS, NOT INTRACTABLE: ICD-10-CM

## 2019-12-23 RX ORDER — TOPIRAMATE 25 MG/1
50 TABLET ORAL 2 TIMES DAILY
Qty: 120 TABLET | Refills: 0 | Status: SHIPPED | OUTPATIENT
Start: 2019-12-23 | End: 2020-03-24

## 2019-12-26 ENCOUNTER — OFFICE VISIT (OUTPATIENT)
Dept: NEUROLOGY | Facility: CLINIC | Age: 59
End: 2019-12-26
Payer: COMMERCIAL

## 2019-12-26 VITALS
DIASTOLIC BLOOD PRESSURE: 95 MMHG | HEART RATE: 85 BPM | BODY MASS INDEX: 40.33 KG/M2 | WEIGHT: 219.13 LBS | HEIGHT: 62 IN | SYSTOLIC BLOOD PRESSURE: 193 MMHG

## 2019-12-26 DIAGNOSIS — R20.2 PARESTHESIAS: Primary | ICD-10-CM

## 2019-12-26 DIAGNOSIS — G43.009 MIGRAINE WITHOUT AURA AND WITHOUT STATUS MIGRAINOSUS, NOT INTRACTABLE: ICD-10-CM

## 2019-12-26 DIAGNOSIS — I10 HYPERTENSION, UNSPECIFIED TYPE: ICD-10-CM

## 2019-12-26 PROCEDURE — 99999 PR PBB SHADOW E&M-EST. PATIENT-LVL III: ICD-10-PCS | Mod: PBBFAC,,, | Performed by: PSYCHIATRY & NEUROLOGY

## 2019-12-26 PROCEDURE — 99214 PR OFFICE/OUTPT VISIT, EST, LEVL IV, 30-39 MIN: ICD-10-PCS | Mod: S$GLB,,, | Performed by: PSYCHIATRY & NEUROLOGY

## 2019-12-26 PROCEDURE — 3008F PR BODY MASS INDEX (BMI) DOCUMENTED: ICD-10-PCS | Mod: CPTII,S$GLB,, | Performed by: PSYCHIATRY & NEUROLOGY

## 2019-12-26 PROCEDURE — 3008F BODY MASS INDEX DOCD: CPT | Mod: CPTII,S$GLB,, | Performed by: PSYCHIATRY & NEUROLOGY

## 2019-12-26 PROCEDURE — 99999 PR PBB SHADOW E&M-EST. PATIENT-LVL III: CPT | Mod: PBBFAC,,, | Performed by: PSYCHIATRY & NEUROLOGY

## 2019-12-26 PROCEDURE — 99214 OFFICE O/P EST MOD 30 MIN: CPT | Mod: S$GLB,,, | Performed by: PSYCHIATRY & NEUROLOGY

## 2019-12-26 RX ORDER — TRAMADOL HYDROCHLORIDE 50 MG/1
TABLET ORAL
COMMUNITY
Start: 2019-12-12 | End: 2022-06-16 | Stop reason: ALTCHOICE

## 2019-12-26 RX ORDER — DICLOFENAC SODIUM 75 MG/1
75 TABLET, DELAYED RELEASE ORAL
COMMUNITY
Start: 2019-12-06 | End: 2020-12-05

## 2019-12-26 NOTE — PROGRESS NOTES
Neurology Follow Up Note    Chief Complaint: follow up for migraines    Interval History:  Since last visit, patient feels her headaches are improved on topamax 50mg bid. She states that she has a mild headache daily without any other associated symptoms. She states she will get a typical migraine approximately 1 to 2 times a month. She states sprix spray helps for this. She is also taking ajovy injections which is helping her. She had a recent CBC and CMP which were unremarkable. She continues to have left hand paresthesias, we discussed an MRI wrist to help aid in diagnosis, but patient would like to hold off at this time. She had an Mri brain since last visit which was unremarkable. She has no further complaints.      Last Visit 7/31/19:  Since last visit, patient states she has been getting daily headaches. She states they are a 6/10 pain on average. She feels sprix spray helps her, and she uses this 2 times a week. She describes her headache as gradual onset aching and throbbing pain associated with photophobia, phonophobia, nausea, and visual aura of black dots. She denies vomiting. She denies recent fever. She has tried lamictal and imitrex in the past without relief. She was tried on propranolol, but stopped this as she felt it made her gain weight. She is on topamax 25mg in the morning and 50mg at bedtime and is tolerating this well. She feels topamax is helping her with her mood and maybe a little bit with her headaches. She states she has difficulty sleeping at night, because she is always thinking. She admits to being anxious, but denies suicidal or homicidal ideation. She also started ajovy injections and states this helps for a day or two, but then the headache returns. She has no further complaints. She had an MRI at [facility] yesterday for which I do not have the results as of yet.    Visit 5/27/19:   Makenna Candelaria is a 59 y.o. female with migraines who presents for follow up appointment. She reports  that since last visit, her headaches have improved with the ajovy injections. She states after the injection, she did not have a headache for 2 weeks, however, for the past two weeks she has been getting headaches daily. She describes them as gradual onset throbbing holocephalic headaches associated with photophobia and phonophobia. These headaches are usually a 6/10. She states topamax has helped her nighttime headaches, but she still has headaches during the day. She is tolerating topamax well. She states she has left hand paresthesias, but is tolerating it well. I have advised her to get an MRI of the left wrist to further evaluate for carpal tunnel syndrome given inconclusive EMG, however, she states she would like to hold off on this at this time. She has no further complaints.        Past Medical History:  Past Medical History:   Diagnosis Date    Breast cancer     Chronic headaches     Hypertension        Past Surgical History:  Past Surgical History:   Procedure Laterality Date    BREAST BIOPSY      BREAST LUMPECTOMY      BUNIONECTOMY       SECTION         Social History:  Social History     Socioeconomic History    Marital status: Single     Spouse name: Not on file    Number of children: Not on file    Years of education: Not on file    Highest education level: Not on file   Occupational History    Not on file   Social Needs    Financial resource strain: Not on file    Food insecurity:     Worry: Not on file     Inability: Not on file    Transportation needs:     Medical: Not on file     Non-medical: Not on file   Tobacco Use    Smoking status: Never Smoker    Smokeless tobacco: Never Used   Substance and Sexual Activity    Alcohol use: Not on file    Drug use: Not on file    Sexual activity: Not on file   Lifestyle    Physical activity:     Days per week: Not on file     Minutes per session: Not on file    Stress: Not on file   Relationships    Social connections:     Talks  on phone: Not on file     Gets together: Not on file     Attends Jain service: Not on file     Active member of club or organization: Not on file     Attends meetings of clubs or organizations: Not on file     Relationship status: Not on file   Other Topics Concern    Not on file   Social History Narrative    Not on file       Family History:  Family History   Problem Relation Age of Onset    Breast cancer Mother     Breast cancer Maternal Aunt     Breast cancer Maternal Aunt     Ovarian cancer Neg Hx     Colon cancer Neg Hx        Medications:  Current Outpatient Medications   Medication Sig Dispense Refill    butorphanol (STADOL) 10 mg/mL nasal spray ONE SPRAY IN ONE NOSTRIL EVERY 8-12 HOURS IF FIORINAL NOT WORKING FOR HEADACHE  0    diclofenac (VOLTAREN) 75 MG EC tablet Take 75 mg by mouth.      escitalopram oxalate (LEXAPRO) 10 MG tablet       fremanezumab-vfrm (AJOVY) 225 mg/1.5 mL injection INJECT INTO MUSCLE EVERY 28 DAYS 1.5 mL 3    hydroCHLOROthiazide (HYDRODIURIL) 25 MG tablet       ketorolac (SPRIX) 15.75 mg/spray Spry Spray in 1 nostril every 6 hours as needed for severe migraine. Do not exceed 4 times a day or more than 3 times a week 5 each 5    losartan (COZAAR) 100 MG tablet       losartan-hydrochlorothiazide 50-12.5 mg (HYZAAR) 50-12.5 mg per tablet       propranolol (INDERAL) 20 MG tablet Take 1 tablet (20 mg total) by mouth once daily. 30 tablet 1    TAMOXIFEN CITRATE (TAMOXIFEN ORAL) Take by mouth.      topiramate (TOPAMAX) 25 MG tablet Take 2 tablets (50 mg total) by mouth 2 (two) times daily. 120 tablet 0    traMADol (ULTRAM) 50 mg tablet       LORazepam (ATIVAN) 1 MG tablet Take 1 tablet (1 mg total) by mouth once as needed (Take 1 tablet 20-30 min prior to MRI). 1 tablet 0     No current facility-administered medications for this visit.        Allergies:  Review of patient's allergies indicates:   Allergen Reactions    Sulfa (sulfonamide antibiotics)        ROS:  A  12 point review of system was negative aside from pertinent positives and negatives as outlined above.    Physical Exam  Vitals:    12/26/19 1443   BP: (!) 193/95   Pulse: 85       General: well nourished, well developed  Eyes: no scleral icterus   Nose: nasal turbinates intact  Neck: supple, ROM intact  Skin: no rash or ecchymosis  Joints: no swelling or erythema  Cardiac: regular rate and rhythm  Lungs: clear to auscultation bilaterally    Neuro:  Mental status: AAO x 3, no dysarthria, no aphasia, communicating appropriately  CN: PERRL, EOMI, VFF, V1-V3 sensation intact, no facial asymmetry, hearing grossly intact, tongue midline  Motor:   RUE 5/5  RLE 5/5  LUE 5/5  LLE 5/5    Normal bulk and tone    Reflexes: 2+ throughout, toes equivocal bilaterally  Sensory: intact to light touch throughout  Coordination: no dysmetria on FTN  Gait: steady    Prior Imaging/Labs:  Reviewed      Assessment and Plan:    59 y.o. female with migraine w/aura improved on combination of topamax and ajovy. She is tolerating these medications well    1. Migraine   C/w topamax 50mg bid  C/w ajovy injection once a month  C/w sprix spray as needed for severe migraine      2. Paresthesias  We discussed obtaining an MRI left wrist, but patient would like to hold off at this time  We discussed starting low dose gabapentin, but patient would like to hold off at this time as she is worried about gaining weight      3. HTN  Patient was made aware that her BP is very elevated today. She was advised to check this at home and if running higher than SBP 130s, she was advised to confer with her PCP tomorrow regarding further management. She was made aware of the dangers of elevated BP including stroke    Patient was made aware of side effects of medication(s) prescribed and was advised to notify me if she experiences any.        Patient was advised to notify me for worsening symptoms. I will see patient back in 3 months or sooner if necessary.        Christiane Myrick DO  [facility]  [address] [facility] [address]  [place], [place] [id number] [phone]

## 2019-12-27 ENCOUNTER — TELEPHONE (OUTPATIENT)
Dept: NEUROLOGY | Facility: CLINIC | Age: 59
End: 2019-12-27

## 2019-12-27 NOTE — TELEPHONE ENCOUNTER
----- Message from Scott Boydaux sent at 12/27/2019 11:55 AM CST -----  Contact: [facility]  Type: RX Refill Request    Who Called:  Self    Have you contacted your pharmacy: yes    Refill or New Rx: refill     RX Name and Strength: fremanezumab-vfrm (AJOVY) 225 mg/1.5 mL injection    Preferred Pharmacy with phone number: [facility] [place] [address] [phone] (Phone)  [phone] (Fax)    Local or Mail Order: local     Ordering Provider: Michelle    Would the patient rather a call back or a response via [facility]? Call     Best Call Back Number: [phone]    Gave refill to Ed   Additional Information:  Request faxed on yesterday as well

## 2020-03-04 RX ORDER — TOPIRAMATE 25 MG/1
TABLET ORAL
Qty: 90 TABLET | OUTPATIENT
Start: 2020-03-04

## 2020-03-19 DIAGNOSIS — G43.009 MIGRAINE WITHOUT AURA AND WITHOUT STATUS MIGRAINOSUS, NOT INTRACTABLE: ICD-10-CM

## 2020-03-19 RX ORDER — KETOROLAC TROMETHAMINE 15.75 MG/1
SPRAY, METERED NASAL
Qty: 5 EACH | Refills: 5 | Status: SHIPPED | OUTPATIENT
Start: 2020-03-19 | End: 2020-11-19 | Stop reason: SDUPTHER

## 2020-03-24 RX ORDER — TOPIRAMATE 25 MG/1
50 TABLET ORAL 2 TIMES DAILY
Qty: 120 TABLET | Refills: 0 | Status: SHIPPED | OUTPATIENT
Start: 2020-03-24 | End: 2020-04-21

## 2020-04-03 ENCOUNTER — TELEPHONE (OUTPATIENT)
Dept: NEUROLOGY | Facility: CLINIC | Age: 60
End: 2020-04-03

## 2020-04-03 NOTE — TELEPHONE ENCOUNTER
fremanezumab-rm (AJOVY) 225 mg/1.5 mL injection CaseId:[id number];Status:Approved;Review Type:Prior Auth;Coverage Start Date:03/04/2020;Coverage End Date:04/03/2021;Faxed info to pharm.

## 2020-04-14 DIAGNOSIS — G43.009 MIGRAINE WITHOUT AURA AND WITHOUT STATUS MIGRAINOSUS, NOT INTRACTABLE: ICD-10-CM

## 2020-04-14 RX ORDER — FREMANEZUMAB-VFRM 225 MG/1.5ML
INJECTION SUBCUTANEOUS
Qty: 1.5 ML | Refills: 3 | Status: SHIPPED | OUTPATIENT
Start: 2020-04-14 | End: 2020-09-01 | Stop reason: SDUPTHER

## 2020-04-21 RX ORDER — TOPIRAMATE 25 MG/1
50 TABLET ORAL 2 TIMES DAILY
Qty: 120 TABLET | Refills: 0 | Status: SHIPPED | OUTPATIENT
Start: 2020-04-21 | End: 2020-08-05 | Stop reason: SDUPTHER

## 2020-07-23 ENCOUNTER — TELEPHONE (OUTPATIENT)
Dept: NEUROLOGY | Facility: CLINIC | Age: 60
End: 2020-07-23

## 2020-07-23 NOTE — TELEPHONE ENCOUNTER
Returned call no answer.              ----- Message from Gill Moreno sent at 7/23/2020 11:11 AM CDT -----  Type: Patient Call Back    Who called:self    What is the request in detail:pt was a pt of Dr Martinez and has medication that need to be filled    Can the clinic reply by [facility]?no    Would the patient rather a call back or a response via [facility]? call    Best call back number:

## 2020-08-05 RX ORDER — TOPIRAMATE 50 MG/1
50 TABLET, FILM COATED ORAL 2 TIMES DAILY
Qty: 60 TABLET | Refills: 2 | Status: SHIPPED | OUTPATIENT
Start: 2020-08-05 | End: 2020-08-18

## 2020-08-05 NOTE — TELEPHONE ENCOUNTER
Sent to Dr. Castellanos.        ----- Message from Brigette Durant sent at 8/5/2020 10:58 AM CDT -----  Regarding: refill  Can the clinic reply in [facility]: no      Please refill the medication(s) listed below. Please call the patient when the prescription(s) is ready for  at this phone number   [phone]      Medication #1 topiramate (TOPAMAX) 25 MG tablet    Medication #2       [facility]

## 2020-08-18 ENCOUNTER — OFFICE VISIT (OUTPATIENT)
Dept: NEUROLOGY | Facility: CLINIC | Age: 60
End: 2020-08-18
Payer: COMMERCIAL

## 2020-08-18 VITALS
WEIGHT: 227 LBS | SYSTOLIC BLOOD PRESSURE: 161 MMHG | BODY MASS INDEX: 41.77 KG/M2 | HEIGHT: 62 IN | DIASTOLIC BLOOD PRESSURE: 78 MMHG | HEART RATE: 80 BPM

## 2020-08-18 DIAGNOSIS — G43.009 MIGRAINE WITHOUT AURA AND WITHOUT STATUS MIGRAINOSUS, NOT INTRACTABLE: Primary | ICD-10-CM

## 2020-08-18 DIAGNOSIS — G56.02 CARPAL TUNNEL SYNDROME OF LEFT WRIST: ICD-10-CM

## 2020-08-18 PROCEDURE — 3077F SYST BP >= 140 MM HG: CPT | Mod: CPTII,S$GLB,, | Performed by: NEUROLOGICAL SURGERY

## 2020-08-18 PROCEDURE — 3008F PR BODY MASS INDEX (BMI) DOCUMENTED: ICD-10-PCS | Mod: CPTII,S$GLB,, | Performed by: NEUROLOGICAL SURGERY

## 2020-08-18 PROCEDURE — 99214 OFFICE O/P EST MOD 30 MIN: CPT | Mod: S$GLB,,, | Performed by: NEUROLOGICAL SURGERY

## 2020-08-18 PROCEDURE — 99214 PR OFFICE/OUTPT VISIT, EST, LEVL IV, 30-39 MIN: ICD-10-PCS | Mod: S$GLB,,, | Performed by: NEUROLOGICAL SURGERY

## 2020-08-18 PROCEDURE — 99999 PR PBB SHADOW E&M-EST. PATIENT-LVL III: ICD-10-PCS | Mod: PBBFAC,,, | Performed by: NEUROLOGICAL SURGERY

## 2020-08-18 PROCEDURE — 99999 PR PBB SHADOW E&M-EST. PATIENT-LVL III: CPT | Mod: PBBFAC,,, | Performed by: NEUROLOGICAL SURGERY

## 2020-08-18 PROCEDURE — 3078F DIAST BP <80 MM HG: CPT | Mod: CPTII,S$GLB,, | Performed by: NEUROLOGICAL SURGERY

## 2020-08-18 PROCEDURE — 3077F PR MOST RECENT SYSTOLIC BLOOD PRESSURE >= 140 MM HG: ICD-10-PCS | Mod: CPTII,S$GLB,, | Performed by: NEUROLOGICAL SURGERY

## 2020-08-18 PROCEDURE — 3078F PR MOST RECENT DIASTOLIC BLOOD PRESSURE < 80 MM HG: ICD-10-PCS | Mod: CPTII,S$GLB,, | Performed by: NEUROLOGICAL SURGERY

## 2020-08-18 PROCEDURE — 3008F BODY MASS INDEX DOCD: CPT | Mod: CPTII,S$GLB,, | Performed by: NEUROLOGICAL SURGERY

## 2020-08-18 RX ORDER — TOPIRAMATE 100 MG/1
100 CAPSULE, EXTENDED RELEASE ORAL DAILY
Qty: 30 CAPSULE | Refills: 11 | Status: SHIPPED | OUTPATIENT
Start: 2020-08-18 | End: 2021-08-06

## 2020-08-18 NOTE — PROGRESS NOTES
Chief Complaint   Patient presents with    Migraine        Makenna Candelaria is a 60 y.o. female with a history of multiple medical diagnoses as listed below that presents for evaluation of migraines and suspected carpal tunnel syndrome.  Patient has been taking her Topamax 50 mg twice a day as well as taking a job the monthly.  Headaches seem to be getting worse with increasing frequency, increasing intensity, and longer duration.  Her headaches have been not as responsive to control or lack nasal spray compared to when she was last seen in clinic.  She has not had any significant improvement in the numbness and tingling in her left fingers.  She feels that her right side was the same when she was diagnosed with carpal tunnel syndrome and had carpal tunnel release surgery.  Previous nerve conduction study was inconclusive on the left side.     PAST MEDICAL HISTORY:  Past Medical History:   Diagnosis Date    Breast cancer     Chronic headaches     Hypertension        PAST SURGICAL HISTORY:  Past Surgical History:   Procedure Laterality Date    BREAST BIOPSY      BREAST LUMPECTOMY      BUNIONECTOMY       SECTION         SOCIAL HISTORY:  Social History     Socioeconomic History    Marital status: Single     Spouse name: Not on file    Number of children: Not on file    Years of education: Not on file    Highest education level: Not on file   Occupational History    Not on file   Social Needs    Financial resource strain: Not on file    Food insecurity     Worry: Not on file     Inability: Not on file    Transportation needs     Medical: Not on file     Non-medical: Not on file   Tobacco Use    Smoking status: Never Smoker    Smokeless tobacco: Never Used   Substance and Sexual Activity    Alcohol use: Not on file    Drug use: Not on file    Sexual activity: Not on file   Lifestyle    Physical activity     Days per week: Not on file     Minutes per session: Not on file    Stress: Not on  file   Relationships    Social connections     Talks on phone: Not on file     Gets together: Not on file     Attends Church service: Not on file     Active member of club or organization: Not on file     Attends meetings of clubs or organizations: Not on file     Relationship status: Not on file   Other Topics Concern    Not on file   Social History Narrative    Not on file       FAMILY HISTORY:  Family History   Problem Relation Age of Onset    Breast cancer Mother     Breast cancer Maternal Aunt     Breast cancer Maternal Aunt     Ovarian cancer Neg Hx     Colon cancer Neg Hx        ALLERGIES AND MEDICATIONS: updated and reviewed.  Review of patient's allergies indicates:   Allergen Reactions    Sulfa (sulfonamide antibiotics)      Current Outpatient Medications   Medication Sig Dispense Refill    AJOVY 225 mg/1.5 mL injection INJECT INTO MUSCLE EVERY 28 DAYS 1.5 mL 3    diclofenac (VOLTAREN) 75 MG EC tablet Take 75 mg by mouth.      escitalopram oxalate (LEXAPRO) 10 MG tablet       hydroCHLOROthiazide (HYDRODIURIL) 25 MG tablet       ketorolac (SPRIX) 15.75 mg/spray Spry Spray in 1 nostril every 6 hours as needed for severe migraine. Do not exceed 4 times a day or more than 3 times a week 5 each 5    losartan (COZAAR) 100 MG tablet       losartan-hydrochlorothiazide 50-12.5 mg (HYZAAR) 50-12.5 mg per tablet       TAMOXIFEN CITRATE (TAMOXIFEN ORAL) Take by mouth.      traMADol (ULTRAM) 50 mg tablet       butorphanol (STADOL) 10 mg/mL nasal spray ONE SPRAY IN ONE NOSTRIL EVERY 8-12 HOURS IF FIORINAL NOT WORKING FOR HEADACHE  0    LORazepam (ATIVAN) 1 MG tablet Take 1 tablet (1 mg total) by mouth once as needed (Take 1 tablet 20-30 min prior to MRI). 1 tablet 0    topiramate (TROKENDI XR) 100 mg Cp24 Take 1 capsule (100 mg total) by mouth once daily. 30 capsule 11     No current facility-administered medications for this visit.        Review of Systems   Constitutional: Negative for activity  change, appetite change, fever and unexpected weight change.   HENT: Negative for trouble swallowing and voice change.    Eyes: Positive for photophobia and visual disturbance.   Respiratory: Negative for apnea and shortness of breath.    Cardiovascular: Negative for chest pain and leg swelling.   Gastrointestinal: Positive for nausea and vomiting. Negative for constipation.   Genitourinary: Negative for difficulty urinating.   Musculoskeletal: Negative for back pain, gait problem and neck pain.   Skin: Negative for color change and pallor.   Neurological: Positive for headaches. Negative for dizziness, seizures, syncope, weakness and numbness.   Hematological: Negative for adenopathy.   Psychiatric/Behavioral: Negative for agitation, confusion and decreased concentration.       Neurologic Exam     Mental Status   Oriented to person, place, and time.   Registration: recalls 3 of 3 objects.   Attention: normal. Concentration: normal.   Speech: speech is normal   Level of consciousness: alert  Knowledge: good.     Cranial Nerves     CN II   Visual fields full to confrontation.   Right visual field deficit: none  Left visual field deficit: none     CN III, IV, VI   Pupils are equal, round, and reactive to light.  Extraocular motions are normal.   Right pupil: Size: 3 mm. Shape: regular. Accommodation: intact.   Left pupil: Size: 3 mm. Shape: regular. Accommodation: intact.   CN III: no CN III palsy  CN VI: no CN VI palsy  Nystagmus: none   Diplopia: none  Ophthalmoparesis: none  Upgaze: normal  Downgaze: normal  Conjugate gaze: present    CN V   Facial sensation intact.   Right facial sensation deficit: none  Left facial sensation deficit: none    CN VII   Facial expression full, symmetric.   Right facial weakness: none  Left facial weakness: none    CN VIII   CN VIII normal.     CN IX, X   CN IX normal.   CN X normal.   Palate: symmetric    CN XI   CN XI normal.   Right sternocleidomastoid strength: normal  Left  sternocleidomastoid strength: normal  Right trapezius strength: normal  Left trapezius strength: normal    CN XII   CN XII normal.   Tongue deviation: none    Motor Exam   Muscle bulk: normal  Overall muscle tone: normal  Right arm tone: normal  Left arm tone: normal  Right leg tone: normal  Left leg tone: normal    Strength   Strength 5/5 except as noted.   Right interossei: 4/5  Left interossei: 4/5    Sensory Exam   Right arm light touch: decreased from fingers  Left arm light touch: decreased from fingers  Right leg light touch: normal  Left leg light touch: normal  Right arm vibration: decreased from fingers  Left arm vibration: decreased from fingers  Right leg vibration: normal  Left leg vibration: normal  Right arm proprioception: normal  Left arm proprioception: normal  Right leg proprioception: normal  Left leg proprioception: normal  Right arm pinprick: decreased from fingers  Left arm pinprick: decreased from fingers  Right leg pinprick: normal  Left leg pinprick: normal  Sensory deficit distribution on right: median  Sensory deficit distribution on left: median    Gait, Coordination, and Reflexes     Gait  Gait: normal    Coordination   Romberg: negative  Finger to nose coordination: normal  Heel to shin coordination: normal  Tandem walking coordination: normal    Tremor   Resting tremor: absent    Reflexes   Right brachioradialis: 2+  Left brachioradialis: 2+  Right biceps: 2+  Left biceps: 2+  Right triceps: 2+  Left triceps: 2+  Right patellar: 2+  Left patellar: 2+  Right achilles: 2+  Left achilles: 2+  Right plantar: normal  Left plantar: normal      Physical Exam  Vitals signs reviewed.   Constitutional:       Appearance: She is well-developed.   HENT:      Head: Normocephalic and atraumatic.   Eyes:      Extraocular Movements: EOM normal.      Pupils: Pupils are equal, round, and reactive to light.   Neck:      Musculoskeletal: Normal range of motion.   Cardiovascular:      Rate and Rhythm:  "Normal rate.   Pulmonary:      Effort: Pulmonary effort is normal. No respiratory distress.   Musculoskeletal: Normal range of motion.   Skin:     General: Skin is warm and dry.   Neurological:      Mental Status: She is alert and oriented to person, place, and time.      Coordination: Finger-Nose-Finger Test, Heel to Shin Test and Romberg Test normal.      Gait: Gait is intact. Tandem walk normal.      Deep Tendon Reflexes:      Reflex Scores:       Tricep reflexes are 2+ on the right side and 2+ on the left side.       Bicep reflexes are 2+ on the right side and 2+ on the left side.       Brachioradialis reflexes are 2+ on the right side and 2+ on the left side.       Patellar reflexes are 2+ on the right side and 2+ on the left side.       Achilles reflexes are 2+ on the right side and 2+ on the left side.  Psychiatric:         Speech: Speech normal.         Behavior: Behavior normal.         Thought Content: Thought content normal.         Vitals:    08/18/20 1007   BP: (!) 161/78   BP Location: Right arm   Patient Position: Sitting   BP Method: Large (Automatic)   Pulse: 80   Weight: 103 kg (227 lb)   Height: 5' 2" (1.575 m)       Assessment & Plan:    Problem List Items Addressed This Visit     Migraines - Primary    Overview     Patient is losing headache control on her current dose of topiramate.  She will benefit from switch to extended release topiramate which has worked better and offers better headache control         Relevant Medications    topiramate (TROKENDI XR) 100 mg Cp24    Carpal tunnel syndrome of left wrist    Overview     Numbness in the fingers of the left hand likely due to carpal tunnel syndrome.  Patient would benefit from EMG/nerve conduction studies to evaluate the severity however patient said that she wants to defer any treatment due to the ongoing restrictions to COVID-19.  She does not feel like she will warrant any procedures intervention at this time and wants to defer         "     More than 50% of this 25 minute encounter was spent in counseling and coordinating care of migraine in carpal tunnel.    Follow-up: Follow up in about 2 months (around 10/18/2020).    This note was done with the assistance of voice recognition software. Some errors may be present after proofreading.

## 2020-09-01 DIAGNOSIS — G43.009 MIGRAINE WITHOUT AURA AND WITHOUT STATUS MIGRAINOSUS, NOT INTRACTABLE: ICD-10-CM

## 2020-09-02 RX ORDER — FREMANEZUMAB-VFRM 225 MG/1.5ML
INJECTION SUBCUTANEOUS
Qty: 1.5 ML | Refills: 3 | Status: SHIPPED | OUTPATIENT
Start: 2020-09-02 | End: 2020-09-14 | Stop reason: SDUPTHER

## 2020-09-14 DIAGNOSIS — G43.009 MIGRAINE WITHOUT AURA AND WITHOUT STATUS MIGRAINOSUS, NOT INTRACTABLE: ICD-10-CM

## 2020-09-15 RX ORDER — FREMANEZUMAB-VFRM 225 MG/1.5ML
INJECTION SUBCUTANEOUS
Qty: 1.5 ML | Refills: 3 | Status: SHIPPED | OUTPATIENT
Start: 2020-09-15 | End: 2021-04-14 | Stop reason: SDUPTHER

## 2020-10-07 ENCOUNTER — TELEPHONE (OUTPATIENT)
Dept: NEUROLOGY | Facility: CLINIC | Age: 60
End: 2020-10-07

## 2020-10-07 NOTE — TELEPHONE ENCOUNTER
Patient c/o she is still having headaches, the pain is on her temples and behind her ears sometimes, she don't sleep and his anxiety issues. She would like to know what to do because the headaches are not going away.                ----- Message from Scott Acevedo sent at 10/7/2020  3:41 PM CDT -----  Regarding: self  Type: Patient Call Back    Who called: self    What is the request in detail: please call to discuss her medication     Can the clinic reply by [facility]? no    Would the patient rather a call back or a response via [facility]?call     Best call back number:  [phone]

## 2020-10-08 RX ORDER — AMITRIPTYLINE HYDROCHLORIDE 25 MG/1
25 TABLET, FILM COATED ORAL NIGHTLY
Qty: 30 TABLET | Refills: 11 | Status: SHIPPED | OUTPATIENT
Start: 2020-10-08 | End: 2020-11-19

## 2020-11-19 ENCOUNTER — OFFICE VISIT (OUTPATIENT)
Dept: NEUROLOGY | Facility: CLINIC | Age: 60
End: 2020-11-19
Payer: COMMERCIAL

## 2020-11-19 VITALS
SYSTOLIC BLOOD PRESSURE: 132 MMHG | BODY MASS INDEX: 42.07 KG/M2 | WEIGHT: 228.63 LBS | HEART RATE: 82 BPM | DIASTOLIC BLOOD PRESSURE: 63 MMHG | HEIGHT: 62 IN

## 2020-11-19 DIAGNOSIS — G43.009 MIGRAINE WITHOUT AURA AND WITHOUT STATUS MIGRAINOSUS, NOT INTRACTABLE: ICD-10-CM

## 2020-11-19 PROCEDURE — 99999 PR PBB SHADOW E&M-EST. PATIENT-LVL III: CPT | Mod: PBBFAC,,, | Performed by: NEUROLOGICAL SURGERY

## 2020-11-19 PROCEDURE — 99999 PR PBB SHADOW E&M-EST. PATIENT-LVL III: ICD-10-PCS | Mod: PBBFAC,,, | Performed by: NEUROLOGICAL SURGERY

## 2020-11-19 PROCEDURE — 99214 PR OFFICE/OUTPT VISIT, EST, LEVL IV, 30-39 MIN: ICD-10-PCS | Mod: S$GLB,,, | Performed by: NEUROLOGICAL SURGERY

## 2020-11-19 PROCEDURE — 1126F AMNT PAIN NOTED NONE PRSNT: CPT | Mod: S$GLB,,, | Performed by: NEUROLOGICAL SURGERY

## 2020-11-19 PROCEDURE — 1126F PR PAIN SEVERITY QUANTIFIED, NO PAIN PRESENT: ICD-10-PCS | Mod: S$GLB,,, | Performed by: NEUROLOGICAL SURGERY

## 2020-11-19 PROCEDURE — 3075F PR MOST RECENT SYSTOLIC BLOOD PRESS GE 130-139MM HG: ICD-10-PCS | Mod: CPTII,S$GLB,, | Performed by: NEUROLOGICAL SURGERY

## 2020-11-19 PROCEDURE — 3075F SYST BP GE 130 - 139MM HG: CPT | Mod: CPTII,S$GLB,, | Performed by: NEUROLOGICAL SURGERY

## 2020-11-19 PROCEDURE — 3078F DIAST BP <80 MM HG: CPT | Mod: CPTII,S$GLB,, | Performed by: NEUROLOGICAL SURGERY

## 2020-11-19 PROCEDURE — 3008F BODY MASS INDEX DOCD: CPT | Mod: CPTII,S$GLB,, | Performed by: NEUROLOGICAL SURGERY

## 2020-11-19 PROCEDURE — 3078F PR MOST RECENT DIASTOLIC BLOOD PRESSURE < 80 MM HG: ICD-10-PCS | Mod: CPTII,S$GLB,, | Performed by: NEUROLOGICAL SURGERY

## 2020-11-19 PROCEDURE — 3008F PR BODY MASS INDEX (BMI) DOCUMENTED: ICD-10-PCS | Mod: CPTII,S$GLB,, | Performed by: NEUROLOGICAL SURGERY

## 2020-11-19 PROCEDURE — 99214 OFFICE O/P EST MOD 30 MIN: CPT | Mod: S$GLB,,, | Performed by: NEUROLOGICAL SURGERY

## 2020-11-19 RX ORDER — AMITRIPTYLINE HYDROCHLORIDE 50 MG/1
50 TABLET, FILM COATED ORAL NIGHTLY
Qty: 30 TABLET | Refills: 11 | Status: SHIPPED | OUTPATIENT
Start: 2020-11-19 | End: 2021-11-24 | Stop reason: SDUPTHER

## 2020-11-19 RX ORDER — KETOROLAC TROMETHAMINE 15.75 MG/1
SPRAY, METERED NASAL
Qty: 5 EACH | Refills: 5 | Status: SHIPPED | OUTPATIENT
Start: 2020-11-19 | End: 2021-04-28 | Stop reason: SDUPTHER

## 2020-11-19 NOTE — PROGRESS NOTES
Chief Complaint   Patient presents with    Migraine        Makenna Candelaria is a 60 y.o. female with a history of multiple medical diagnoses as listed below that presents for evaluation of migraines and suspected carpal tunnel syndrome.  Patient has been taking her Topamax 50 mg twice a day as well as taking a job the monthly.  Headaches seem to be getting worse with increasing frequency, increasing intensity, and longer duration.  Her headaches have been not as responsive to control or lack nasal spray compared to when she was last seen in clinic.  She has not had any significant improvement in the numbness and tingling in her left fingers.  She feels that her right side was the same when she was diagnosed with carpal tunnel syndrome and had carpal tunnel release surgery.  Previous nerve conduction study was inconclusive on the left side.    Interval History  2020  She was unable to get extended release topiramate so she was given Elavil. Since taking it she feels that headaches have been under better control and she has been able to function better. Elavil does make her sleepy but she has been able to take it and fall asleep through the night without any excessive sedation in the daytime. She has not been able to get any consistent relief from her abortive medications.    PAST MEDICAL HISTORY:  Past Medical History:   Diagnosis Date    Breast cancer     Chronic headaches     Hypertension        PAST SURGICAL HISTORY:  Past Surgical History:   Procedure Laterality Date    BREAST BIOPSY      BREAST LUMPECTOMY      BUNIONECTOMY       SECTION         SOCIAL HISTORY:  Social History     Socioeconomic History    Marital status: Single     Spouse name: Not on file    Number of children: Not on file    Years of education: Not on file    Highest education level: Not on file   Occupational History    Not on file   Social Needs    Financial resource strain: Not on file    Food insecurity      Worry: Not on file     Inability: Not on file    Transportation needs     Medical: Not on file     Non-medical: Not on file   Tobacco Use    Smoking status: Never Smoker    Smokeless tobacco: Never Used   Substance and Sexual Activity    Alcohol use: Not on file    Drug use: Not on file    Sexual activity: Not on file   Lifestyle    Physical activity     Days per week: Not on file     Minutes per session: Not on file    Stress: Not on file   Relationships    Social connections     Talks on phone: Not on file     Gets together: Not on file     Attends Hinduism service: Not on file     Active member of club or organization: Not on file     Attends meetings of clubs or organizations: Not on file     Relationship status: Not on file   Other Topics Concern    Not on file   Social History Narrative    Not on file       FAMILY HISTORY:  Family History   Problem Relation Age of Onset    Breast cancer Mother     Breast cancer Maternal Aunt     Breast cancer Maternal Aunt     Ovarian cancer Neg Hx     Colon cancer Neg Hx        ALLERGIES AND MEDICATIONS: updated and reviewed.  Review of patient's allergies indicates:   Allergen Reactions    Sulfa (sulfonamide antibiotics)      Current Outpatient Medications   Medication Sig Dispense Refill    amitriptyline (ELAVIL) 50 MG tablet Take 1 tablet (50 mg total) by mouth every evening. Begin with one half tablet nightly for one week. 30 tablet 11    butorphanol (STADOL) 10 mg/mL nasal spray ONE SPRAY IN ONE NOSTRIL EVERY 8-12 HOURS IF FIORINAL NOT WORKING FOR HEADACHE  0    diclofenac (VOLTAREN) 75 MG EC tablet Take 75 mg by mouth.      escitalopram oxalate (LEXAPRO) 10 MG tablet       fremanezumab-vfrm (AJOVY SYRINGE) 225 mg/1.5 mL injection INJECT INTO MUSCLE EVERY 28 DAYS 1.5 mL 3    hydroCHLOROthiazide (HYDRODIURIL) 25 MG tablet       ketorolac (SPRIX) 15.75 mg/spray Spry Spray in 1 nostril every 6 hours as needed for severe migraine. Do not exceed 4  times a day or more than 3 times a week 5 each 5    losartan (COZAAR) 100 MG tablet       losartan-hydrochlorothiazide 50-12.5 mg (HYZAAR) 50-12.5 mg per tablet       TAMOXIFEN CITRATE (TAMOXIFEN ORAL) Take by mouth.      topiramate (TROKENDI XR) 100 mg Cp24 Take 1 capsule (100 mg total) by mouth once daily. 30 capsule 11    traMADol (ULTRAM) 50 mg tablet       LORazepam (ATIVAN) 1 MG tablet Take 1 tablet (1 mg total) by mouth once as needed (Take 1 tablet 20-30 min prior to MRI). 1 tablet 0     No current facility-administered medications for this visit.        Review of Systems   Constitutional: Negative for activity change, appetite change, fever and unexpected weight change.   HENT: Negative for trouble swallowing and voice change.    Eyes: Positive for photophobia and visual disturbance.   Respiratory: Negative for apnea and shortness of breath.    Cardiovascular: Negative for chest pain and leg swelling.   Gastrointestinal: Positive for nausea and vomiting. Negative for constipation.   Genitourinary: Negative for difficulty urinating.   Musculoskeletal: Negative for back pain, gait problem and neck pain.   Skin: Negative for color change and pallor.   Neurological: Positive for headaches. Negative for dizziness, seizures, syncope, weakness and numbness.   Hematological: Negative for adenopathy.   Psychiatric/Behavioral: Negative for agitation, confusion and decreased concentration.       Neurologic Exam     Mental Status   Oriented to person, place, and time.   Registration: recalls 3 of 3 objects.   Attention: normal. Concentration: normal.   Speech: speech is normal   Level of consciousness: alert  Knowledge: good.     Cranial Nerves     CN II   Visual fields full to confrontation.   Right visual field deficit: none  Left visual field deficit: none     CN III, IV, VI   Pupils are equal, round, and reactive to light.  Extraocular motions are normal.   Right pupil: Size: 3 mm. Shape: regular.  Accommodation: intact.   Left pupil: Size: 3 mm. Shape: regular. Accommodation: intact.   CN III: no CN III palsy  CN VI: no CN VI palsy  Nystagmus: none   Diplopia: none  Ophthalmoparesis: none  Upgaze: normal  Downgaze: normal  Conjugate gaze: present    CN V   Facial sensation intact.   Right facial sensation deficit: none  Left facial sensation deficit: none    CN VII   Facial expression full, symmetric.   Right facial weakness: none  Left facial weakness: none    CN VIII   CN VIII normal.     CN IX, X   CN IX normal.   CN X normal.   Palate: symmetric    CN XI   CN XI normal.   Right sternocleidomastoid strength: normal  Left sternocleidomastoid strength: normal  Right trapezius strength: normal  Left trapezius strength: normal    CN XII   CN XII normal.   Tongue deviation: none    Motor Exam   Muscle bulk: normal  Overall muscle tone: normal  Right arm tone: normal  Left arm tone: normal  Right leg tone: normal  Left leg tone: normal    Strength   Strength 5/5 except as noted.   Right interossei: 4/5  Left interossei: 4/5    Sensory Exam   Right arm light touch: decreased from fingers  Left arm light touch: decreased from fingers  Right leg light touch: normal  Left leg light touch: normal  Right arm vibration: decreased from fingers  Left arm vibration: decreased from fingers  Right leg vibration: normal  Left leg vibration: normal  Right arm proprioception: normal  Left arm proprioception: normal  Right leg proprioception: normal  Left leg proprioception: normal  Right arm pinprick: decreased from fingers  Left arm pinprick: decreased from fingers  Right leg pinprick: normal  Left leg pinprick: normal  Sensory deficit distribution on right: median  Sensory deficit distribution on left: median    Gait, Coordination, and Reflexes     Gait  Gait: normal    Coordination   Romberg: negative  Finger to nose coordination: normal  Heel to shin coordination: normal  Tandem walking coordination: normal    Tremor  "  Resting tremor: absent    Reflexes   Right brachioradialis: 2+  Left brachioradialis: 2+  Right biceps: 2+  Left biceps: 2+  Right triceps: 2+  Left triceps: 2+  Right patellar: 2+  Left patellar: 2+  Right achilles: 2+  Left achilles: 2+  Right plantar: normal  Left plantar: normal      Physical Exam  Vitals signs reviewed.   Constitutional:       Appearance: She is well-developed.   HENT:      Head: Normocephalic and atraumatic.   Eyes:      Extraocular Movements: EOM normal.      Pupils: Pupils are equal, round, and reactive to light.   Neck:      Musculoskeletal: Normal range of motion.   Cardiovascular:      Rate and Rhythm: Normal rate.   Pulmonary:      Effort: Pulmonary effort is normal. No respiratory distress.   Musculoskeletal: Normal range of motion.   Skin:     General: Skin is warm and dry.   Neurological:      Mental Status: She is alert and oriented to person, place, and time.      Coordination: Finger-Nose-Finger Test, Heel to Shin Test and Romberg Test normal.      Gait: Gait is intact. Tandem walk normal.      Deep Tendon Reflexes:      Reflex Scores:       Tricep reflexes are 2+ on the right side and 2+ on the left side.       Bicep reflexes are 2+ on the right side and 2+ on the left side.       Brachioradialis reflexes are 2+ on the right side and 2+ on the left side.       Patellar reflexes are 2+ on the right side and 2+ on the left side.       Achilles reflexes are 2+ on the right side and 2+ on the left side.  Psychiatric:         Speech: Speech normal.         Behavior: Behavior normal.         Thought Content: Thought content normal.         Vitals:    11/19/20 1131   BP: 132/63   BP Location: Right arm   Patient Position: Sitting   BP Method: Large (Automatic)   Pulse: 82   Weight: 103.7 kg (228 lb 9.9 oz)   Height: 5' 2" (1.575 m)       Assessment & Plan:    Problem List Items Addressed This Visit     Migraines    Overview     Patient is losing headache control on her current dose of " topiramate.  She has had improved prevention with Elavil. Patient asked to consider Ubrelvy as her abortive therapy.         Relevant Medications    ketorolac (SPRIX) 15.75 mg/spray Spry    amitriptyline (ELAVIL) 50 MG tablet        More than 50% of this 25 minute encounter was spent in counseling and coordinating care of migraine in carpal tunnel.    Follow-up: Follow up in about 6 months (around 5/19/2021).    This note was done with the assistance of voice recognition software. Some errors may be present after proofreading.

## 2021-04-14 DIAGNOSIS — G43.009 MIGRAINE WITHOUT AURA AND WITHOUT STATUS MIGRAINOSUS, NOT INTRACTABLE: ICD-10-CM

## 2021-04-15 ENCOUNTER — PATIENT MESSAGE (OUTPATIENT)
Dept: RESEARCH | Facility: HOSPITAL | Age: 61
End: 2021-04-15

## 2021-04-15 RX ORDER — FREMANEZUMAB-VFRM 225 MG/1.5ML
INJECTION SUBCUTANEOUS
Qty: 1.5 ML | Refills: 3 | Status: SHIPPED | OUTPATIENT
Start: 2021-04-15 | End: 2021-08-03 | Stop reason: SDUPTHER

## 2021-04-28 DIAGNOSIS — G43.009 MIGRAINE WITHOUT AURA AND WITHOUT STATUS MIGRAINOSUS, NOT INTRACTABLE: ICD-10-CM

## 2021-04-29 RX ORDER — KETOROLAC TROMETHAMINE 15.75 MG/1
SPRAY, METERED NASAL
Qty: 5 EACH | Refills: 5 | Status: SHIPPED | OUTPATIENT
Start: 2021-04-29 | End: 2021-09-24 | Stop reason: SDUPTHER

## 2021-05-18 DIAGNOSIS — G43.109 MIGRAINE WITH AURA AND WITHOUT STATUS MIGRAINOSUS, NOT INTRACTABLE: ICD-10-CM

## 2021-05-18 DIAGNOSIS — G43.009 MIGRAINE WITHOUT AURA AND WITHOUT STATUS MIGRAINOSUS, NOT INTRACTABLE: Primary | ICD-10-CM

## 2021-05-18 RX ORDER — BUTORPHANOL TARTRATE 10 MG/ML
SPRAY NASAL
Refills: 0 | Status: CANCELLED | OUTPATIENT
Start: 2021-05-18

## 2021-05-24 DIAGNOSIS — G43.009 MIGRAINE WITHOUT AURA AND WITHOUT STATUS MIGRAINOSUS, NOT INTRACTABLE: Primary | ICD-10-CM

## 2021-05-24 RX ORDER — SUMATRIPTAN 10 MG/1
10 SPRAY NASAL ONCE AS NEEDED
Qty: 6 EACH | Refills: 5 | Status: SHIPPED | OUTPATIENT
Start: 2021-05-24 | End: 2021-05-24

## 2021-08-03 DIAGNOSIS — G43.009 MIGRAINE WITHOUT AURA AND WITHOUT STATUS MIGRAINOSUS, NOT INTRACTABLE: ICD-10-CM

## 2021-08-03 RX ORDER — FREMANEZUMAB-VFRM 225 MG/1.5ML
INJECTION SUBCUTANEOUS
Qty: 1.5 ML | Refills: 3 | Status: SHIPPED | OUTPATIENT
Start: 2021-08-03 | End: 2021-12-29 | Stop reason: SDUPTHER

## 2021-08-24 ENCOUNTER — TELEPHONE (OUTPATIENT)
Dept: NEUROLOGY | Facility: CLINIC | Age: 61
End: 2021-08-24

## 2021-09-10 ENCOUNTER — PATIENT MESSAGE (OUTPATIENT)
Dept: NEUROLOGY | Facility: CLINIC | Age: 61
End: 2021-09-10

## 2021-09-13 DIAGNOSIS — G43.009 MIGRAINE WITHOUT AURA AND WITHOUT STATUS MIGRAINOSUS, NOT INTRACTABLE: Primary | ICD-10-CM

## 2021-09-13 RX ORDER — UBROGEPANT 100 MG/1
100 TABLET ORAL ONCE AS NEEDED
Qty: 30 TABLET | Refills: 2 | Status: SHIPPED | OUTPATIENT
Start: 2021-09-13 | End: 2021-09-13

## 2021-09-21 ENCOUNTER — LAB VISIT (OUTPATIENT)
Dept: PRIMARY CARE CLINIC | Facility: OTHER | Age: 61
End: 2021-09-21
Attending: INTERNAL MEDICINE
Payer: COMMERCIAL

## 2021-09-21 DIAGNOSIS — Z20.822 ENCOUNTER FOR LABORATORY TESTING FOR COVID-19 VIRUS: ICD-10-CM

## 2021-09-21 PROCEDURE — U0003 INFECTIOUS AGENT DETECTION BY NUCLEIC ACID (DNA OR RNA); SEVERE ACUTE RESPIRATORY SYNDROME CORONAVIRUS 2 (SARS-COV-2) (CORONAVIRUS DISEASE [COVID-19]), AMPLIFIED PROBE TECHNIQUE, MAKING USE OF HIGH THROUGHPUT TECHNOLOGIES AS DESCRIBED BY CMS-2020-01-R: HCPCS | Performed by: INTERNAL MEDICINE

## 2021-09-22 LAB
SARS-COV-2 RNA RESP QL NAA+PROBE: DETECTED
SARS-COV-2- CYCLE NUMBER: 30

## 2021-09-24 DIAGNOSIS — G43.009 MIGRAINE WITHOUT AURA AND WITHOUT STATUS MIGRAINOSUS, NOT INTRACTABLE: ICD-10-CM

## 2021-09-25 RX ORDER — KETOROLAC TROMETHAMINE 15.75 MG/1
SPRAY, METERED NASAL
Qty: 5 EACH | Refills: 5 | Status: SHIPPED | OUTPATIENT
Start: 2021-09-25

## 2021-10-13 ENCOUNTER — TELEPHONE (OUTPATIENT)
Dept: NEUROLOGY | Facility: CLINIC | Age: 61
End: 2021-10-13

## 2021-10-14 RX ORDER — PROCHLORPERAZINE MALEATE 10 MG
10 TABLET ORAL 3 TIMES DAILY PRN
Qty: 60 TABLET | Refills: 3 | Status: SHIPPED | OUTPATIENT
Start: 2021-10-14 | End: 2021-12-29

## 2021-10-14 RX ORDER — SUMATRIPTAN SUCCINATE 100 MG/1
100 TABLET ORAL
Qty: 12 TABLET | Refills: 5 | Status: SHIPPED | OUTPATIENT
Start: 2021-10-14 | End: 2022-06-16 | Stop reason: ALTCHOICE

## 2021-10-14 RX ORDER — METHYLPREDNISOLONE 4 MG/1
TABLET ORAL
Qty: 1 PACKAGE | Refills: 0 | Status: SHIPPED | OUTPATIENT
Start: 2021-10-14 | End: 2022-06-16 | Stop reason: ALTCHOICE

## 2021-11-24 DIAGNOSIS — G43.009 MIGRAINE WITHOUT AURA AND WITHOUT STATUS MIGRAINOSUS, NOT INTRACTABLE: ICD-10-CM

## 2021-11-26 RX ORDER — AMITRIPTYLINE HYDROCHLORIDE 50 MG/1
50 TABLET, FILM COATED ORAL NIGHTLY
Qty: 30 TABLET | Refills: 0 | Status: SHIPPED | OUTPATIENT
Start: 2021-11-26 | End: 2021-12-29 | Stop reason: SDUPTHER

## 2021-12-29 DIAGNOSIS — G43.009 MIGRAINE WITHOUT AURA AND WITHOUT STATUS MIGRAINOSUS, NOT INTRACTABLE: ICD-10-CM

## 2021-12-29 RX ORDER — PROCHLORPERAZINE MALEATE 10 MG
TABLET ORAL
Qty: 60 TABLET | Refills: 0 | Status: SHIPPED | OUTPATIENT
Start: 2021-12-29 | End: 2023-03-23 | Stop reason: ALTCHOICE

## 2021-12-29 NOTE — TELEPHONE ENCOUNTER
----- Message from Svitlana Joiner sent at 12/29/2021  1:29 PM CST -----  Regarding: Questions and COncerns  Contact: [phone]  Patient Makenna calling in ref to her medication. She stated some of her rx are being filled but some arent. She would like to have the nurse return her call to have some clarity on which meds she should and shouldn't be taking. Please call [phone]      Thank You and Have a great day    Appointment booked, refills sent

## 2021-12-30 LAB — BCS RECOMMENDATION EXT: NORMAL

## 2022-01-03 RX ORDER — FREMANEZUMAB-VFRM 225 MG/1.5ML
INJECTION SUBCUTANEOUS
Qty: 1.5 ML | Refills: 3 | Status: SHIPPED | OUTPATIENT
Start: 2022-01-03 | End: 2022-04-25 | Stop reason: SDUPTHER

## 2022-01-03 RX ORDER — AMITRIPTYLINE HYDROCHLORIDE 50 MG/1
50 TABLET, FILM COATED ORAL NIGHTLY
Qty: 30 TABLET | Refills: 3 | Status: SHIPPED | OUTPATIENT
Start: 2022-01-03 | End: 2022-03-29

## 2022-03-08 ENCOUNTER — TELEPHONE (OUTPATIENT)
Dept: ADMINISTRATIVE | Facility: HOSPITAL | Age: 62
End: 2022-03-08
Payer: COMMERCIAL

## 2022-03-08 ENCOUNTER — PATIENT OUTREACH (OUTPATIENT)
Dept: ADMINISTRATIVE | Facility: HOSPITAL | Age: 62
End: 2022-03-08
Payer: COMMERCIAL

## 2022-03-08 NOTE — PROGRESS NOTES
.Triggered Links RegistryNo Data in [facility] , [facility] and [facility] Everywhere

## 2022-03-11 ENCOUNTER — TELEPHONE (OUTPATIENT)
Dept: FAMILY MEDICINE | Facility: CLINIC | Age: 62
End: 2022-03-11
Payer: COMMERCIAL

## 2022-03-11 NOTE — TELEPHONE ENCOUNTER
----- Message from Brooklynn Sutton sent at 3/11/2022  2:16 PM CST -----  Contact: Pt  Type:  Sooner Apoointment Request    Caller is requesting a sooner appointment.  Caller declined first available appointment listed below.  Caller will not accept being placed on the waitlist and is requesting a message be sent to doctor.  Name of Caller:New Pt  When is the first available appointment?03/22  Symptoms:n/a  Would the patient rather a call back or a response via [facility]? Call   Best Call Back Number:[phone]  Additional Information:     Offered appt on 03/17 pt stated time 7:40am was too early   Pt prefers 9am

## 2022-03-16 ENCOUNTER — OFFICE VISIT (OUTPATIENT)
Dept: FAMILY MEDICINE | Facility: CLINIC | Age: 62
End: 2022-03-16
Payer: COMMERCIAL

## 2022-03-16 VITALS
WEIGHT: 222.63 LBS | SYSTOLIC BLOOD PRESSURE: 132 MMHG | OXYGEN SATURATION: 98 % | DIASTOLIC BLOOD PRESSURE: 78 MMHG | BODY MASS INDEX: 40.97 KG/M2 | HEART RATE: 110 BPM | HEIGHT: 62 IN

## 2022-03-16 DIAGNOSIS — Z00.00 GENERAL MEDICAL EXAM: Primary | ICD-10-CM

## 2022-03-16 DIAGNOSIS — Z12.11 COLON CANCER SCREENING: ICD-10-CM

## 2022-03-16 PROCEDURE — 3008F PR BODY MASS INDEX (BMI) DOCUMENTED: ICD-10-PCS | Mod: CPTII,S$GLB,, | Performed by: FAMILY MEDICINE

## 2022-03-16 PROCEDURE — 3075F PR MOST RECENT SYSTOLIC BLOOD PRESS GE 130-139MM HG: ICD-10-PCS | Mod: CPTII,S$GLB,, | Performed by: FAMILY MEDICINE

## 2022-03-16 PROCEDURE — 3075F SYST BP GE 130 - 139MM HG: CPT | Mod: CPTII,S$GLB,, | Performed by: FAMILY MEDICINE

## 2022-03-16 PROCEDURE — 99396 PR PREVENTIVE VISIT,EST,40-64: ICD-10-PCS | Mod: S$GLB,,, | Performed by: FAMILY MEDICINE

## 2022-03-16 PROCEDURE — 1159F PR MEDICATION LIST DOCUMENTED IN MEDICAL RECORD: ICD-10-PCS | Mod: CPTII,S$GLB,, | Performed by: FAMILY MEDICINE

## 2022-03-16 PROCEDURE — 3078F PR MOST RECENT DIASTOLIC BLOOD PRESSURE < 80 MM HG: ICD-10-PCS | Mod: CPTII,S$GLB,, | Performed by: FAMILY MEDICINE

## 2022-03-16 PROCEDURE — 99999 PR PBB SHADOW E&M-EST. PATIENT-LVL V: CPT | Mod: PBBFAC,,, | Performed by: FAMILY MEDICINE

## 2022-03-16 PROCEDURE — 3078F DIAST BP <80 MM HG: CPT | Mod: CPTII,S$GLB,, | Performed by: FAMILY MEDICINE

## 2022-03-16 PROCEDURE — 1159F MED LIST DOCD IN RCRD: CPT | Mod: CPTII,S$GLB,, | Performed by: FAMILY MEDICINE

## 2022-03-16 PROCEDURE — 1160F PR REVIEW ALL MEDS BY PRESCRIBER/CLIN PHARMACIST DOCUMENTED: ICD-10-PCS | Mod: CPTII,S$GLB,, | Performed by: FAMILY MEDICINE

## 2022-03-16 PROCEDURE — 99396 PREV VISIT EST AGE 40-64: CPT | Mod: S$GLB,,, | Performed by: FAMILY MEDICINE

## 2022-03-16 PROCEDURE — 4010F ACE/ARB THERAPY RXD/TAKEN: CPT | Mod: CPTII,S$GLB,, | Performed by: FAMILY MEDICINE

## 2022-03-16 PROCEDURE — 4010F PR ACE/ARB THEARPY RXD/TAKEN: ICD-10-PCS | Mod: CPTII,S$GLB,, | Performed by: FAMILY MEDICINE

## 2022-03-16 PROCEDURE — 1160F RVW MEDS BY RX/DR IN RCRD: CPT | Mod: CPTII,S$GLB,, | Performed by: FAMILY MEDICINE

## 2022-03-16 PROCEDURE — 99999 PR PBB SHADOW E&M-EST. PATIENT-LVL V: ICD-10-PCS | Mod: PBBFAC,,, | Performed by: FAMILY MEDICINE

## 2022-03-16 PROCEDURE — 3008F BODY MASS INDEX DOCD: CPT | Mod: CPTII,S$GLB,, | Performed by: FAMILY MEDICINE

## 2022-03-16 RX ORDER — SEMAGLUTIDE 0.25 MG/.5ML
0.25 INJECTION, SOLUTION SUBCUTANEOUS
Qty: 2 ML | Refills: 3 | Status: SHIPPED | OUTPATIENT
Start: 2022-03-16 | End: 2022-04-15

## 2022-03-16 NOTE — PROGRESS NOTES
[facility] Note    Chief Complaint      Chief Complaint   Patient presents with    Annual Exam    Establish Care     History of Present Illness     Makenna Candelaria is a 62 y.o. female who presents today with:    Here for well visit. Routine labs and a discussion about trying weight loss medication.  She has never tried anything and has a weight loss goal to be 150lbs.  She is about to start exercising and working diet.  She is motivated.  No fam hx of thyroid cancer or pancreatitis.     Problem List Items Addressed This Visit    None     Visit Diagnoses     General medical exam    -  Primary    Relevant Medications    semaglutide, weight loss, (WEGOVY) 0.25 mg/0.5 mL PnIj    Other Relevant Orders    CBC Auto Differential    Comprehensive Metabolic Panel    Hemoglobin A1C    Lipid Panel    TSH    Urinalysis    T4, Free          Health Maintenance   Topic Date Due    Hepatitis C Screening  Never done    Lipid Panel  Never done    TETANUS VACCINE  Never done    Mammogram  2022       Past Medical History:   Diagnosis Date    Breast cancer     Chronic headaches     Hypertension        Past Surgical History:   Procedure Laterality Date    BREAST BIOPSY      BREAST LUMPECTOMY      BUNIONECTOMY       SECTION         family history includes Breast cancer in her maternal aunt, maternal aunt, and mother.     Social History     Tobacco Use    Smoking status: Never Smoker    Smokeless tobacco: Never Used       Review of Systems   Constitutional: Negative for chills, fever, malaise/fatigue and weight loss.   HENT: Negative for congestion, ear discharge and ear pain.    Eyes: Negative for blurred vision.   Respiratory: Negative for cough and shortness of breath.    Cardiovascular: Negative for chest pain and leg swelling.   Gastrointestinal: Negative for abdominal pain, constipation, diarrhea and vomiting.   Genitourinary: Negative for dysuria.   Musculoskeletal: Negative for  myalgias.   Skin: Negative for rash.   Neurological: Negative for dizziness, tingling, focal weakness, weakness and headaches.   Endo/Heme/Allergies: Does not bruise/bleed easily.   Psychiatric/Behavioral: Negative for depression and suicidal ideas.        Outpatient Encounter Medications as of 3/16/2022   Medication Sig Dispense Refill    amitriptyline (ELAVIL) 50 MG tablet Take 1 tablet (50 mg total) by mouth every evening. Begin with one half tablet nightly for one week. 30 tablet 3    escitalopram oxalate (LEXAPRO) 10 MG tablet       fremanezumab-vfrm (AJOVY SYRINGE) 225 mg/1.5 mL injection INJECT INTO MUSCLE EVERY 28 DAYS 1.5 mL 3    hydroCHLOROthiazide (HYDRODIURIL) 25 MG tablet       losartan (COZAAR) 100 MG tablet       prochlorperazine (COMPAZINE) 10 MG tablet TAKE 1 TABLET(10 MG) BY MOUTH THREE TIMES DAILY AS NEEDED FOR NAUSEA OR HEADACHE 60 tablet 0    traMADol (ULTRAM) 50 mg tablet       TROKENDI  mg Cp24 TAKE 1 CAPSULE(100 MG) BY MOUTH EVERY DAY 30 capsule 11    butorphanol (STADOL) 10 mg/mL nasal spray ONE SPRAY IN ONE NOSTRIL EVERY 8-12 HOURS IF FIORINAL NOT WORKING FOR HEADACHE  0    ketorolac (SPRIX) 15.75 mg/spray Spry Spray in 1 nostril every 6 hours as needed for severe migraine. Do not exceed 4 times a day or more than 3 times a week (Patient not taking: Reported on 3/16/2022) 5 each 5    LORazepam (ATIVAN) 1 MG tablet Take 1 tablet (1 mg total) by mouth once as needed (Take 1 tablet 20-30 min prior to MRI). 1 tablet 0    losartan-hydrochlorothiazide 50-12.5 mg (HYZAAR) 50-12.5 mg per tablet       methylPREDNISolone (MEDROL DOSEPACK) 4 mg tablet use as directed 1 Package 0    semaglutide, weight loss, (WEGOVY) 0.25 mg/0.5 mL PnIj Inject 0.25 mg into the skin every 7 days. 2 mL 3    sumatriptan (IMITREX) 100 MG tablet Take 1 tablet (100 mg total) by mouth as needed for Migraine. Take at the onset of headache, may take 1 more in 1 hour if needed. (Patient not taking: Reported  "on 3/16/2022) 12 tablet 5    [DISCONTINUED] TAMOXIFEN CITRATE (TAMOXIFEN ORAL) Take by mouth.       No facility-administered encounter medications on file as of 3/16/2022.       Review of patient's allergies indicates:   Allergen Reactions    Sulfa (sulfonamide antibiotics)        Physical Exam      Vital Signs  Pulse: 110  SpO2: 98 %  BP: 132/78  Pain Score: 0-No pain  Height and Weight  Height: 5' 2" (157.5 cm)  Weight: 101 kg (222 lb 9.6 oz)  BSA (Calculated - sq m): 2.1 sq meters  BMI (Calculated): 40.7  Weight in (lb) to have BMI = 25: 136.4]    Physical Exam  Vitals reviewed.   Constitutional:       General: She is not in acute distress.     Appearance: Normal appearance. She is normal weight. She is not ill-appearing.   HENT:      Head: Normocephalic.      Right Ear: Tympanic membrane normal.      Left Ear: Tympanic membrane normal.      Nose: Nose normal.      Mouth/Throat:      Mouth: Mucous membranes are moist.      Pharynx: Oropharynx is clear.   Eyes:      Extraocular Movements: Extraocular movements intact.      Conjunctiva/sclera: Conjunctivae normal.      Pupils: Pupils are equal, round, and reactive to light.   Cardiovascular:      Rate and Rhythm: Normal rate and regular rhythm.      Pulses: Normal pulses.      Heart sounds: Normal heart sounds.   Pulmonary:      Effort: Pulmonary effort is normal.      Breath sounds: Normal breath sounds.   Abdominal:      General: Abdomen is flat. Bowel sounds are normal. There is no distension.      Palpations: Abdomen is soft.      Tenderness: There is no abdominal tenderness.   Musculoskeletal:         General: Normal range of motion.      Cervical back: Normal range of motion and neck supple.   Skin:     General: Skin is warm and dry.      Capillary Refill: Capillary refill takes less than 2 seconds.      Findings: No rash.   Neurological:      General: No focal deficit present.      Mental Status: She is alert and oriented to person, place, and time.      " Motor: No weakness.      Gait: Gait normal.   Psychiatric:         Mood and Affect: Mood normal.         Behavior: Behavior normal.         Thought Content: Thought content normal.         Judgment: Judgment normal.          Laboratory:  CBC:  No results for input(s): WBC, RBC, HGB, HCT, PLT, MCV, MCH, MCHC in the last 2160 hours.  CMP:  No results for input(s): GLU, CALCIUM, ALBUMIN, PROT, NA, K, CO2, CL, BUN, ALKPHOS, ALT, AST, BILITOT in the last 2160 hours.    Invalid input(s): CREATININ  URINALYSIS:  No results for input(s): COLORU, CLARITYU, SPECGRAV, PHUR, PROTEINUA, GLUCOSEU, BILIRUBINCON, BLOODU, WBCU, RBCU, BACTERIA, MUCUS, NITRITE, LEUKOCYTESUR, UROBILINOGEN, HYALINECASTS in the last 2160 hours.   LIPIDS:  No results for input(s): TSH, HDL, CHOL, TRIG, LDLCALC, CHOLHDL, NONHDLCHOL, TOTALCHOLEST in the last 2160 hours.  TSH:  No results for input(s): TSH in the last 2160 hours.  A1C:  No results for input(s): HGBA1C in the last 2160 hours.    Radiology:      Assessment/Plan     Makenna Candelaria is a 62 y.o.female with:    1. General medical exam  - CBC Auto Differential; Future  - Comprehensive Metabolic Panel; Future  - Hemoglobin A1C; Future  - Lipid Panel; Future  - TSH; Future  - Urinalysis; Future  - T4, Free; Future  - semaglutide, weight loss, (WEGOVY) 0.25 mg/0.5 mL PnIj; Inject 0.25 mg into the skin every 7 days.  Dispense: 2 mL; Refill: 3  Chronic conditions stable.  Will continue to monitor.     Follow up as discussed    If symptoms worsen or do not improve return to clinic, go to Urgent Care or ER  Take Medications as directed      -Continue current medications and maintain follow up with specialists.         MD [facility] [place]

## 2022-03-16 NOTE — PATIENT INSTRUCTIONS
Recommend reading about Intermittent Fasting.  Would recommend starting at Fasting for 14 hours and eating for 10 hours a day.  Eventually graduating to Fasting for 16 hours and Eating for 8 hours a day.  This style of eating has been shown to decrease sugar levels, cholesterol levels, fluid retention, weight and inflammatory Levels. Bodyfast breana    Also recommend trying to adhere to a 0059-3880 calorie a day intake to aid in weight loss.     Another recommendation would be to adhere to a daily intake of 150 grams of Carbohydrates a day.  This will help with weight loss, sugar levels, cholesterol and fluid retention.      These are just 3 options that are free and simple and keep you aware of what we are putting in our mouths.  Combined with 30-40 minutes of activity a day this could be a great start on your journey to weight loss and better health.      YOU GOT THIS!    A daily food journal for a week would greatly aid me in customizing a regimen for you as well.

## 2022-03-21 ENCOUNTER — TELEPHONE (OUTPATIENT)
Dept: PHARMACY | Facility: CLINIC | Age: 62
End: 2022-03-21
Payer: COMMERCIAL

## 2022-03-21 ENCOUNTER — PATIENT MESSAGE (OUTPATIENT)
Dept: ADMINISTRATIVE | Facility: HOSPITAL | Age: 62
End: 2022-03-21
Payer: COMMERCIAL

## 2022-03-29 ENCOUNTER — OFFICE VISIT (OUTPATIENT)
Dept: NEUROLOGY | Facility: CLINIC | Age: 62
End: 2022-03-29
Payer: COMMERCIAL

## 2022-03-29 VITALS
HEIGHT: 62 IN | SYSTOLIC BLOOD PRESSURE: 138 MMHG | WEIGHT: 220.69 LBS | HEART RATE: 97 BPM | DIASTOLIC BLOOD PRESSURE: 90 MMHG | BODY MASS INDEX: 40.61 KG/M2

## 2022-03-29 DIAGNOSIS — G43.009 MIGRAINE WITHOUT AURA AND WITHOUT STATUS MIGRAINOSUS, NOT INTRACTABLE: Primary | ICD-10-CM

## 2022-03-29 PROCEDURE — 3075F PR MOST RECENT SYSTOLIC BLOOD PRESS GE 130-139MM HG: ICD-10-PCS | Mod: CPTII,S$GLB,, | Performed by: NEUROLOGICAL SURGERY

## 2022-03-29 PROCEDURE — 3075F SYST BP GE 130 - 139MM HG: CPT | Mod: CPTII,S$GLB,, | Performed by: NEUROLOGICAL SURGERY

## 2022-03-29 PROCEDURE — 99999 PR PBB SHADOW E&M-EST. PATIENT-LVL III: CPT | Mod: PBBFAC,,, | Performed by: NEUROLOGICAL SURGERY

## 2022-03-29 PROCEDURE — 99999 PR PBB SHADOW E&M-EST. PATIENT-LVL III: ICD-10-PCS | Mod: PBBFAC,,, | Performed by: NEUROLOGICAL SURGERY

## 2022-03-29 PROCEDURE — 3008F BODY MASS INDEX DOCD: CPT | Mod: CPTII,S$GLB,, | Performed by: NEUROLOGICAL SURGERY

## 2022-03-29 PROCEDURE — 4010F PR ACE/ARB THEARPY RXD/TAKEN: ICD-10-PCS | Mod: CPTII,S$GLB,, | Performed by: NEUROLOGICAL SURGERY

## 2022-03-29 PROCEDURE — 99214 OFFICE O/P EST MOD 30 MIN: CPT | Mod: S$GLB,,, | Performed by: NEUROLOGICAL SURGERY

## 2022-03-29 PROCEDURE — 3080F PR MOST RECENT DIASTOLIC BLOOD PRESSURE >= 90 MM HG: ICD-10-PCS | Mod: CPTII,S$GLB,, | Performed by: NEUROLOGICAL SURGERY

## 2022-03-29 PROCEDURE — 1159F MED LIST DOCD IN RCRD: CPT | Mod: CPTII,S$GLB,, | Performed by: NEUROLOGICAL SURGERY

## 2022-03-29 PROCEDURE — 1159F PR MEDICATION LIST DOCUMENTED IN MEDICAL RECORD: ICD-10-PCS | Mod: CPTII,S$GLB,, | Performed by: NEUROLOGICAL SURGERY

## 2022-03-29 PROCEDURE — 3008F PR BODY MASS INDEX (BMI) DOCUMENTED: ICD-10-PCS | Mod: CPTII,S$GLB,, | Performed by: NEUROLOGICAL SURGERY

## 2022-03-29 PROCEDURE — 4010F ACE/ARB THERAPY RXD/TAKEN: CPT | Mod: CPTII,S$GLB,, | Performed by: NEUROLOGICAL SURGERY

## 2022-03-29 PROCEDURE — 99214 PR OFFICE/OUTPT VISIT, EST, LEVL IV, 30-39 MIN: ICD-10-PCS | Mod: S$GLB,,, | Performed by: NEUROLOGICAL SURGERY

## 2022-03-29 PROCEDURE — 3080F DIAST BP >= 90 MM HG: CPT | Mod: CPTII,S$GLB,, | Performed by: NEUROLOGICAL SURGERY

## 2022-03-29 RX ORDER — AMITRIPTYLINE HYDROCHLORIDE 75 MG/1
75 TABLET ORAL NIGHTLY
Qty: 30 TABLET | Refills: 3 | Status: SHIPPED | OUTPATIENT
Start: 2022-03-29 | End: 2022-07-21

## 2022-03-29 RX ORDER — UBROGEPANT 100 MG/1
100 TABLET ORAL ONCE AS NEEDED
Qty: 30 TABLET | Refills: 2 | Status: SHIPPED | OUTPATIENT
Start: 2022-03-29 | End: 2022-03-29

## 2022-03-30 NOTE — PROGRESS NOTES
Chief Complaint   Patient presents with    Migraine        Makenna Candelaria is a 62 y.o. female with a history of multiple medical diagnoses as listed below that presents for evaluation of migraines and suspected carpal tunnel syndrome.  Patient has been taking her Topamax 50 mg twice a day as well as taking a job the monthly.  Headaches seem to be getting worse with increasing frequency, increasing intensity, and longer duration.  Her headaches have been not as responsive to control or lack nasal spray compared to when she was last seen in clinic.  She has not had any significant improvement in the numbness and tingling in her left fingers.  She feels that her right side was the same when she was diagnosed with carpal tunnel syndrome and had carpal tunnel release surgery.  Previous nerve conduction study was inconclusive on the left side.    Interval History  11/19/2020  She was unable to get extended release topiramate so she was given Elavil. Since taking it she feels that headaches have been under better control and she has been able to function better. Elavil does make her sleepy but she has been able to take it and fall asleep through the night without any excessive sedation in the daytime. She has not been able to get any consistent relief from her abortive medications.    03/29/2022  Headaches have improved after the addition of Elavil.  Medications have been well tolerated with no complaints of side effects.  She also continues to take her other headache preventive medications as recommended.  She still has headaches approximately 2-3 days per week.  Her abortive therapies that she has tried thus far have not offered her any significant improvement quickly..    PAST MEDICAL HISTORY:  Past Medical History:   Diagnosis Date    Breast cancer     Chronic headaches     Hypertension        PAST SURGICAL HISTORY:  Past Surgical History:   Procedure Laterality Date    BREAST BIOPSY      BREAST LUMPECTOMY       BUNIONECTOMY       SECTION         SOCIAL HISTORY:  Social History     Socioeconomic History    Marital status: Single   Tobacco Use    Smoking status: Never Smoker    Smokeless tobacco: Never Used       FAMILY HISTORY:  Family History   Problem Relation Age of Onset    Breast cancer Mother     Breast cancer Maternal Aunt     Breast cancer Maternal Aunt     Ovarian cancer Neg Hx     Colon cancer Neg Hx        ALLERGIES AND MEDICATIONS: updated and reviewed.  Review of patient's allergies indicates:   Allergen Reactions    Sulfa (sulfonamide antibiotics)      Current Outpatient Medications   Medication Sig Dispense Refill    amitriptyline (ELAVIL) 75 MG tablet Take 1 tablet (75 mg total) by mouth every evening. 30 tablet 3    butorphanol (STADOL) 10 mg/mL nasal spray ONE SPRAY IN ONE NOSTRIL EVERY 8-12 HOURS IF FIORINAL NOT WORKING FOR HEADACHE  0    escitalopram oxalate (LEXAPRO) 10 MG tablet       fremanezumab-vfrm (AJOVY SYRINGE) 225 mg/1.5 mL injection INJECT INTO MUSCLE EVERY 28 DAYS 1.5 mL 3    hydroCHLOROthiazide (HYDRODIURIL) 25 MG tablet       ketorolac (SPRIX) 15.75 mg/spray Spry Spray in 1 nostril every 6 hours as needed for severe migraine. Do not exceed 4 times a day or more than 3 times a week (Patient not taking: Reported on 3/16/2022) 5 each 5    LORazepam (ATIVAN) 1 MG tablet Take 1 tablet (1 mg total) by mouth once as needed (Take 1 tablet 20-30 min prior to MRI). 1 tablet 0    losartan (COZAAR) 100 MG tablet       losartan-hydrochlorothiazide 50-12.5 mg (HYZAAR) 50-12.5 mg per tablet       methylPREDNISolone (MEDROL DOSEPACK) 4 mg tablet use as directed 1 Package 0    prochlorperazine (COMPAZINE) 10 MG tablet TAKE 1 TABLET(10 MG) BY MOUTH THREE TIMES DAILY AS NEEDED FOR NAUSEA OR HEADACHE 60 tablet 0    semaglutide, weight loss, (WEGOVY) 0.25 mg/0.5 mL PnIj Inject 0.25 mg into the skin every 7 days. 2 mL 3    sumatriptan (IMITREX) 100 MG tablet Take 1 tablet (100  mg total) by mouth as needed for Migraine. Take at the onset of headache, may take 1 more in 1 hour if needed. (Patient not taking: Reported on 3/16/2022) 12 tablet 5    traMADol (ULTRAM) 50 mg tablet       TROKENDI  mg Cp24 TAKE 1 CAPSULE(100 MG) BY MOUTH EVERY DAY 30 capsule 11     No current facility-administered medications for this visit.       Review of Systems   Constitutional: Negative for activity change, appetite change, fever and unexpected weight change.   HENT: Negative for trouble swallowing and voice change.    Eyes: Positive for photophobia and visual disturbance.   Respiratory: Negative for apnea and shortness of breath.    Cardiovascular: Negative for chest pain and leg swelling.   Gastrointestinal: Positive for nausea and vomiting. Negative for constipation.   Genitourinary: Negative for difficulty urinating.   Musculoskeletal: Negative for back pain, gait problem and neck pain.   Skin: Negative for color change and pallor.   Neurological: Positive for headaches. Negative for dizziness, seizures, syncope, weakness and numbness.   Hematological: Negative for adenopathy.   Psychiatric/Behavioral: Negative for agitation, confusion and decreased concentration.       Neurologic Exam     Mental Status   Oriented to person, place, and time.   Registration: recalls 3 of 3 objects.   Attention: normal. Concentration: normal.   Speech: speech is normal   Level of consciousness: alert  Knowledge: good.     Cranial Nerves     CN II   Visual fields full to confrontation.   Right visual field deficit: none  Left visual field deficit: none     CN III, IV, VI   Pupils are equal, round, and reactive to light.  Extraocular motions are normal.   Right pupil: Size: 3 mm. Shape: regular. Accommodation: intact.   Left pupil: Size: 3 mm. Shape: regular. Accommodation: intact.   CN III: no CN III palsy  CN VI: no CN VI palsy  Nystagmus: none   Diplopia: none  Ophthalmoparesis: none  Upgaze: normal  Downgaze:  normal  Conjugate gaze: present    CN V   Facial sensation intact.   Right facial sensation deficit: none  Left facial sensation deficit: none    CN VII   Facial expression full, symmetric.   Right facial weakness: none  Left facial weakness: none    CN VIII   CN VIII normal.     CN IX, X   CN IX normal.   CN X normal.   Palate: symmetric    CN XI   CN XI normal.   Right sternocleidomastoid strength: normal  Left sternocleidomastoid strength: normal  Right trapezius strength: normal  Left trapezius strength: normal    CN XII   CN XII normal.   Tongue deviation: none    Motor Exam   Muscle bulk: normal  Overall muscle tone: normal  Right arm tone: normal  Left arm tone: normal  Right leg tone: normal  Left leg tone: normal    Strength   Strength 5/5 except as noted.   Right interossei: 4/5  Left interossei: 4/5    Sensory Exam   Right arm light touch: decreased from fingers  Left arm light touch: decreased from fingers  Right leg light touch: normal  Left leg light touch: normal  Right arm vibration: decreased from fingers  Left arm vibration: decreased from fingers  Right leg vibration: normal  Left leg vibration: normal  Right arm proprioception: normal  Left arm proprioception: normal  Right leg proprioception: normal  Left leg proprioception: normal  Right arm pinprick: decreased from fingers  Left arm pinprick: decreased from fingers  Right leg pinprick: normal  Left leg pinprick: normal  Sensory deficit distribution on right: median  Sensory deficit distribution on left: median    Gait, Coordination, and Reflexes     Gait  Gait: normal    Coordination   Romberg: negative  Finger to nose coordination: normal  Heel to shin coordination: normal  Tandem walking coordination: normal    Tremor   Resting tremor: absent    Reflexes   Right brachioradialis: 2+  Left brachioradialis: 2+  Right biceps: 2+  Left biceps: 2+  Right triceps: 2+  Left triceps: 2+  Right patellar: 2+  Left patellar: 2+  Right achilles: 2+  Left  "achilles: 2+  Right plantar: normal  Left plantar: normal      Physical Exam  Vitals reviewed.   Constitutional:       Appearance: She is well-developed.   HENT:      Head: Normocephalic and atraumatic.   Eyes:      Extraocular Movements: EOM normal.      Pupils: Pupils are equal, round, and reactive to light.   Cardiovascular:      Rate and Rhythm: Normal rate.   Pulmonary:      Effort: Pulmonary effort is normal. No respiratory distress.   Musculoskeletal:         General: Normal range of motion.      Cervical back: Normal range of motion.   Skin:     General: Skin is warm and dry.   Neurological:      Mental Status: She is alert and oriented to person, place, and time.      Coordination: Finger-Nose-Finger Test, Heel to Shin Test and Romberg Test normal.      Gait: Gait is intact. Tandem walk normal.      Deep Tendon Reflexes:      Reflex Scores:       Tricep reflexes are 2+ on the right side and 2+ on the left side.       Bicep reflexes are 2+ on the right side and 2+ on the left side.       Brachioradialis reflexes are 2+ on the right side and 2+ on the left side.       Patellar reflexes are 2+ on the right side and 2+ on the left side.       Achilles reflexes are 2+ on the right side and 2+ on the left side.  Psychiatric:         Speech: Speech normal.         Behavior: Behavior normal.         Thought Content: Thought content normal.         Vitals:    03/29/22 1114   BP: (!) 138/90   Pulse: 97   Weight: 100.1 kg (220 lb 10.9 oz)   Height: 5' 2" (1.575 m)       Assessment & Plan:    Problem List Items Addressed This Visit     Migraines - Primary    Overview     Patient is losing headache control on her current dose of topiramate.  She has had improved prevention with Elavil. Patient asked to consider Ubrelvy as her abortive therapy.           Relevant Medications    amitriptyline (ELAVIL) 75 MG tablet      Trial of Ubrelvy    Follow-up: No follow-ups on file.    This note was done with the assistance of voice " recognition software. Some errors may be present after proofreading.

## 2022-04-12 DIAGNOSIS — Z00.00 GENERAL MEDICAL EXAM: ICD-10-CM

## 2022-04-12 RX ORDER — SEMAGLUTIDE 0.5 MG/.5ML
0.5 INJECTION, SOLUTION SUBCUTANEOUS
Qty: 4 ML | Refills: 0 | Status: SHIPPED | OUTPATIENT
Start: 2022-04-12 | End: 2022-04-13 | Stop reason: SDUPTHER

## 2022-04-12 NOTE — TELEPHONE ENCOUNTER
----- Message from Rach Echols sent at 4/12/2022  2:40 PM CDT -----  Contact: [phone]  Type:  RX Refill Request    Who Called: pt called  Refill or New Rx:new  RX Name and Strength: semaglutide, weight loss, (WEGOVY) 0.25 mg/0.5 mL PnIj  How is the patient currently taking it? (ex. 1XDay):  Is this a 30 day or 90 day RX:30 days  Preferred Pharmacy with phone number:[facility]  Local or Mail Order:local  Ordering Provider:Dr. Caballero   Would the patient rather a call back or a response via [facility]? Call back  Best Call Back Number:[phone]  Additional Information: Want a higher dose. Please call pt to advice

## 2022-04-13 NOTE — TELEPHONE ENCOUNTER
----- Message from Natacha Light sent at 4/13/2022  3:53 PM CDT -----  Regarding: Refill  Type:  RX Refill Request    Who Called: pt    Refill or New Rx: refill     RX Name and Strength: semaglutide, weight loss, (WEGOVY) 0.5 mg/0.5 mL Darell    Preferred Pharmacy with phone number: [facility]  [address] [facility] [address] [place] [address]  Phone: [phone] Fax: [phone]    Would the patient rather a call back or a response via [facility]? call    Best Call Back Number: [phone]    Additional Information: would like call regarding medication

## 2022-04-13 NOTE — TELEPHONE ENCOUNTER
Care Due:                  Date            Visit Type   Department     Provider  --------------------------------------------------------------------------------                                EP -                              PRIMARY      [facility]  Last Visit: 03-      [facility]  Next Visit: None Scheduled  None         None Found                                                            Last  Test          Frequency    Reason                     Performed    Due Date  --------------------------------------------------------------------------------    HBA1C.......  6 months...  semaglutide,.............  Not Found    Overdue    Powered by [employer]. Reference number: [id number].   4/13/2022 4:14:41 PM CDT

## 2022-04-14 RX ORDER — SEMAGLUTIDE 0.5 MG/.5ML
0.5 INJECTION, SOLUTION SUBCUTANEOUS
Qty: 4 ML | Refills: 0 | Status: SHIPPED | OUTPATIENT
Start: 2022-04-14 | End: 2022-05-12 | Stop reason: SDUPTHER

## 2022-04-25 DIAGNOSIS — G43.009 MIGRAINE WITHOUT AURA AND WITHOUT STATUS MIGRAINOSUS, NOT INTRACTABLE: ICD-10-CM

## 2022-04-25 RX ORDER — FREMANEZUMAB-VFRM 225 MG/1.5ML
INJECTION SUBCUTANEOUS
Qty: 1.5 ML | Refills: 3 | Status: SHIPPED | OUTPATIENT
Start: 2022-04-25 | End: 2022-06-03 | Stop reason: SDUPTHER

## 2022-05-12 RX ORDER — SEMAGLUTIDE 0.5 MG/.5ML
0.5 INJECTION, SOLUTION SUBCUTANEOUS
Qty: 4 ML | Refills: 0 | Status: SHIPPED | OUTPATIENT
Start: 2022-05-12 | End: 2022-05-13 | Stop reason: SDUPTHER

## 2022-05-13 NOTE — TELEPHONE ENCOUNTER
Spoke with pt, pt stated she is due for a refill of WEGOVY. She has been on the 0.5 mg dose for a month now and would like to go up. Please send to Judith Bowles.

## 2022-05-13 NOTE — TELEPHONE ENCOUNTER
No new care gaps identified.  [facility] Embedded Care Gaps. Reference number: [id number]. 5/13/2022   12:44:07 PM CDT

## 2022-05-13 NOTE — TELEPHONE ENCOUNTER
----- Message from Linda Cid sent at 5/13/2022 11:30 AM CDT -----  Type: Requesting to speak with nurse         Who Called: PT  Regarding: Patient is calling about the refill it is suppose to be a higher dose please advise   Would the patient rather a call back or a response via [facility]? Call back  Best Call Back Number: [phone]  Additional Information: n/a

## 2022-05-16 ENCOUNTER — PATIENT MESSAGE (OUTPATIENT)
Dept: FAMILY MEDICINE | Facility: CLINIC | Age: 62
End: 2022-05-16
Payer: COMMERCIAL

## 2022-05-16 RX ORDER — SEMAGLUTIDE 0.5 MG/.5ML
0.5 INJECTION, SOLUTION SUBCUTANEOUS
Qty: 4 ML | Refills: 0 | Status: SHIPPED | OUTPATIENT
Start: 2022-05-16 | End: 2022-05-26 | Stop reason: SDUPTHER

## 2022-05-17 ENCOUNTER — TELEPHONE (OUTPATIENT)
Dept: FAMILY MEDICINE | Facility: CLINIC | Age: 62
End: 2022-05-17
Payer: COMMERCIAL

## 2022-05-17 ENCOUNTER — PATIENT MESSAGE (OUTPATIENT)
Dept: FAMILY MEDICINE | Facility: CLINIC | Age: 62
End: 2022-05-17
Payer: COMMERCIAL

## 2022-05-17 RX ORDER — SEMAGLUTIDE 0.5 MG/.5ML
1 INJECTION, SOLUTION SUBCUTANEOUS
Qty: 12 ML | Refills: 0 | Status: SHIPPED | OUTPATIENT
Start: 2022-05-17 | End: 2022-05-18 | Stop reason: SDUPTHER

## 2022-05-17 NOTE — TELEPHONE ENCOUNTER
----- Message from Rach Echols sent at 5/17/2022  9:03 AM CDT -----  Contact: [phone]  Type:  Needs Medical Advice    Who Called: pt called   Would the patient rather a call back or a response via [facility]? Call back  Best Call Back Number: [phone]  Additional Information: pt needs the next higher dose of semaglutide, weight loss, (WEGOVY) 0.5 mg/0.5 mL PnIj. Please call pt to advice.

## 2022-05-18 RX ORDER — SEMAGLUTIDE 1 MG/.5ML
1 INJECTION, SOLUTION SUBCUTANEOUS
Qty: 12 ML | Refills: 0 | Status: SHIPPED | OUTPATIENT
Start: 2022-05-18 | End: 2022-06-08

## 2022-05-26 RX ORDER — SEMAGLUTIDE 0.5 MG/.5ML
0.5 INJECTION, SOLUTION SUBCUTANEOUS
Qty: 4 ML | Refills: 0 | Status: SHIPPED | OUTPATIENT
Start: 2022-05-26 | End: 2022-06-08

## 2022-05-26 NOTE — TELEPHONE ENCOUNTER
No new care gaps identified.  [facility] Embedded Care Gaps. Reference number: [id number]. 5/26/2022   7:35:09 AM CDT

## 2022-05-31 ENCOUNTER — PATIENT MESSAGE (OUTPATIENT)
Dept: ADMINISTRATIVE | Facility: HOSPITAL | Age: 62
End: 2022-05-31
Payer: COMMERCIAL

## 2022-06-08 ENCOUNTER — TELEPHONE (OUTPATIENT)
Dept: FAMILY MEDICINE | Facility: CLINIC | Age: 62
End: 2022-06-08
Payer: COMMERCIAL

## 2022-06-08 RX ORDER — SEMAGLUTIDE 1 MG/.5ML
1 INJECTION, SOLUTION SUBCUTANEOUS
Qty: 12 ML | Refills: 0 | Status: SHIPPED | OUTPATIENT
Start: 2022-06-08 | End: 2022-06-30 | Stop reason: ALTCHOICE

## 2022-06-08 NOTE — TELEPHONE ENCOUNTER
----- Message from Sarah Mackay sent at 6/8/2022  9:23 AM CDT -----  Contact: patient/  [phone]  Patient calling to speak with you regarding changing prescription dosage for semaglutide, weight loss, (WEGOVY)  Please advise

## 2022-06-14 ENCOUNTER — TELEPHONE (OUTPATIENT)
Dept: FAMILY MEDICINE | Facility: CLINIC | Age: 62
End: 2022-06-14
Payer: COMMERCIAL

## 2022-06-14 NOTE — TELEPHONE ENCOUNTER
Unfortunately, Dr. Dash Caballero is not practicing with us anymore and is also no longer doing clinical medicine. You will have to establish with a new primary care provider to assist you with any medical issues and weight loss medication.

## 2022-06-14 NOTE — TELEPHONE ENCOUNTER
----- Message from Janel Camarena sent at 6/14/2022  2:00 PM CDT -----  Regarding: call back  Type:  Patient Returning Call    Who Called:patient    Who Left Message for Patient: office    Does the patient know what this is regarding?:missed call    Would the patient rather a call back or a response via [facility]? Call back   Best Call Back Number:[phone]  Additional Information: n/a

## 2022-06-14 NOTE — TELEPHONE ENCOUNTER
----- Message from Kailey Ly sent at 6/14/2022  8:01 AM CDT -----  Contact: [phone]  Type:  Needs Medical Advice    Who Called: Pt   Reason for call; regarding speaking with the nurse regarding the pt needing the next Wegovy Injection for Weight Loss  Pharmacy name and phone #: N/A  Would the patient rather a call back or a response via [facility]? Call back  Best Call Back Number: [phone]

## 2022-06-15 ENCOUNTER — PATIENT MESSAGE (OUTPATIENT)
Dept: FAMILY MEDICINE | Facility: CLINIC | Age: 62
End: 2022-06-15
Payer: COMMERCIAL

## 2022-06-15 RX ORDER — SEMAGLUTIDE 1 MG/.5ML
1 INJECTION, SOLUTION SUBCUTANEOUS
Qty: 12 ML | Refills: 0 | OUTPATIENT
Start: 2022-06-15 | End: 2022-09-13

## 2022-06-15 NOTE — TELEPHONE ENCOUNTER
Care Due:                  Date            Visit Type   Department     Provider  --------------------------------------------------------------------------------                                EP -                              PRIMARY      [facility]  Last Visit: 03-      CARE ([facility])   [facility] Vito Caballero  Next Visit: None Scheduled  None         None Found                                                            Last  Test          Frequency    Reason                     Performed    Due Date  --------------------------------------------------------------------------------    HBA1C.......  6 months...  semaglutide,.............  Not Found    Overdue    [employer] Embedded Care Gaps. Reference number: [id number]. 6/15/2022   10:27:58 AM CDT

## 2022-06-16 ENCOUNTER — OFFICE VISIT (OUTPATIENT)
Dept: FAMILY MEDICINE | Facility: CLINIC | Age: 62
End: 2022-06-16
Payer: COMMERCIAL

## 2022-06-16 VITALS
SYSTOLIC BLOOD PRESSURE: 134 MMHG | BODY MASS INDEX: 39.07 KG/M2 | HEIGHT: 62 IN | TEMPERATURE: 98 F | HEART RATE: 95 BPM | OXYGEN SATURATION: 97 % | WEIGHT: 212.31 LBS | DIASTOLIC BLOOD PRESSURE: 82 MMHG

## 2022-06-16 DIAGNOSIS — E66.01 CLASS 2 SEVERE OBESITY DUE TO EXCESS CALORIES WITH SERIOUS COMORBIDITY AND BODY MASS INDEX (BMI) OF 38.0 TO 38.9 IN ADULT: ICD-10-CM

## 2022-06-16 DIAGNOSIS — I10 ESSENTIAL HYPERTENSION: ICD-10-CM

## 2022-06-16 DIAGNOSIS — Z23 NEED FOR SHINGLES VACCINE: Primary | ICD-10-CM

## 2022-06-16 DIAGNOSIS — G43.009 MIGRAINE WITHOUT AURA AND WITHOUT STATUS MIGRAINOSUS, NOT INTRACTABLE: ICD-10-CM

## 2022-06-16 PROCEDURE — 3079F PR MOST RECENT DIASTOLIC BLOOD PRESSURE 80-89 MM HG: ICD-10-PCS | Mod: CPTII,S$GLB,, | Performed by: STUDENT IN AN ORGANIZED HEALTH CARE EDUCATION/TRAINING PROGRAM

## 2022-06-16 PROCEDURE — 90750 HZV VACC RECOMBINANT IM: CPT | Mod: S$GLB,,, | Performed by: STUDENT IN AN ORGANIZED HEALTH CARE EDUCATION/TRAINING PROGRAM

## 2022-06-16 PROCEDURE — 4010F ACE/ARB THERAPY RXD/TAKEN: CPT | Mod: CPTII,S$GLB,, | Performed by: STUDENT IN AN ORGANIZED HEALTH CARE EDUCATION/TRAINING PROGRAM

## 2022-06-16 PROCEDURE — 90471 IMMUNIZATION ADMIN: CPT | Mod: S$GLB,,, | Performed by: STUDENT IN AN ORGANIZED HEALTH CARE EDUCATION/TRAINING PROGRAM

## 2022-06-16 PROCEDURE — 3079F DIAST BP 80-89 MM HG: CPT | Mod: CPTII,S$GLB,, | Performed by: STUDENT IN AN ORGANIZED HEALTH CARE EDUCATION/TRAINING PROGRAM

## 2022-06-16 PROCEDURE — 99999 PR PBB SHADOW E&M-EST. PATIENT-LVL IV: ICD-10-PCS | Mod: PBBFAC,,, | Performed by: STUDENT IN AN ORGANIZED HEALTH CARE EDUCATION/TRAINING PROGRAM

## 2022-06-16 PROCEDURE — 99214 PR OFFICE/OUTPT VISIT, EST, LEVL IV, 30-39 MIN: ICD-10-PCS | Mod: 25,S$GLB,, | Performed by: STUDENT IN AN ORGANIZED HEALTH CARE EDUCATION/TRAINING PROGRAM

## 2022-06-16 PROCEDURE — 1159F MED LIST DOCD IN RCRD: CPT | Mod: CPTII,S$GLB,, | Performed by: STUDENT IN AN ORGANIZED HEALTH CARE EDUCATION/TRAINING PROGRAM

## 2022-06-16 PROCEDURE — 99999 PR PBB SHADOW E&M-EST. PATIENT-LVL IV: CPT | Mod: PBBFAC,,, | Performed by: STUDENT IN AN ORGANIZED HEALTH CARE EDUCATION/TRAINING PROGRAM

## 2022-06-16 PROCEDURE — 3008F PR BODY MASS INDEX (BMI) DOCUMENTED: ICD-10-PCS | Mod: CPTII,S$GLB,, | Performed by: STUDENT IN AN ORGANIZED HEALTH CARE EDUCATION/TRAINING PROGRAM

## 2022-06-16 PROCEDURE — 3075F PR MOST RECENT SYSTOLIC BLOOD PRESS GE 130-139MM HG: ICD-10-PCS | Mod: CPTII,S$GLB,, | Performed by: STUDENT IN AN ORGANIZED HEALTH CARE EDUCATION/TRAINING PROGRAM

## 2022-06-16 PROCEDURE — 3008F BODY MASS INDEX DOCD: CPT | Mod: CPTII,S$GLB,, | Performed by: STUDENT IN AN ORGANIZED HEALTH CARE EDUCATION/TRAINING PROGRAM

## 2022-06-16 PROCEDURE — 1159F PR MEDICATION LIST DOCUMENTED IN MEDICAL RECORD: ICD-10-PCS | Mod: CPTII,S$GLB,, | Performed by: STUDENT IN AN ORGANIZED HEALTH CARE EDUCATION/TRAINING PROGRAM

## 2022-06-16 PROCEDURE — 90750 ZOSTER RECOMBINANT VACCINE: ICD-10-PCS | Mod: S$GLB,,, | Performed by: STUDENT IN AN ORGANIZED HEALTH CARE EDUCATION/TRAINING PROGRAM

## 2022-06-16 PROCEDURE — 4010F PR ACE/ARB THEARPY RXD/TAKEN: ICD-10-PCS | Mod: CPTII,S$GLB,, | Performed by: STUDENT IN AN ORGANIZED HEALTH CARE EDUCATION/TRAINING PROGRAM

## 2022-06-16 PROCEDURE — 99214 OFFICE O/P EST MOD 30 MIN: CPT | Mod: 25,S$GLB,, | Performed by: STUDENT IN AN ORGANIZED HEALTH CARE EDUCATION/TRAINING PROGRAM

## 2022-06-16 PROCEDURE — 3075F SYST BP GE 130 - 139MM HG: CPT | Mod: CPTII,S$GLB,, | Performed by: STUDENT IN AN ORGANIZED HEALTH CARE EDUCATION/TRAINING PROGRAM

## 2022-06-16 PROCEDURE — 90471 ZOSTER RECOMBINANT VACCINE: ICD-10-PCS | Mod: S$GLB,,, | Performed by: STUDENT IN AN ORGANIZED HEALTH CARE EDUCATION/TRAINING PROGRAM

## 2022-06-16 RX ORDER — LOSARTAN POTASSIUM 100 MG/1
100 TABLET ORAL DAILY
Qty: 90 TABLET | Refills: 3 | Status: SHIPPED | OUTPATIENT
Start: 2022-06-16 | End: 2023-05-26

## 2022-06-16 RX ORDER — HYDROCHLOROTHIAZIDE 25 MG/1
25 TABLET ORAL DAILY
Qty: 90 TABLET | Refills: 3 | Status: SHIPPED | OUTPATIENT
Start: 2022-06-16 | End: 2023-05-29

## 2022-06-16 RX ORDER — SEMAGLUTIDE 1.7 MG/.75ML
1.7 INJECTION, SOLUTION SUBCUTANEOUS
Qty: 0.75 ML | Refills: 2 | Status: SHIPPED | OUTPATIENT
Start: 2022-06-16 | End: 2022-06-30 | Stop reason: SDUPTHER

## 2022-06-16 RX ORDER — UBROGEPANT 100 MG/1
TABLET ORAL
COMMUNITY
Start: 2022-05-22 | End: 2022-08-29 | Stop reason: SDUPTHER

## 2022-06-16 RX ORDER — TIZANIDINE 2 MG/1
2 TABLET ORAL NIGHTLY PRN
COMMUNITY
Start: 2022-01-31 | End: 2022-06-16 | Stop reason: ALTCHOICE

## 2022-06-16 NOTE — PROGRESS NOTES
2022    Makenna Candelaria  [id number]    Chief Complaint   Patient presents with    Establish Care       HPI    This patient is new to me and presents to establish care and knows that she is behinds on labs. Chronic medical conditions listed below.    Anxiety: lexapro 10 mg BID    Obesity: on wegovy 1 mg weekly thinks that she needs to titrate up    HTN: losartan 100 mg daily HCTZ 25 mg daily    Migraines: follows with neurology. On elavil    Follows with cardiology, Dr. Jay for irregular heart beat    Hx of breast ca DCIS; follows yearly but is overdue    Sleep apnea: does not use CPAP    POSITIVE BOLDED  General: fever, chills, fatigue, weight loss  Eyes: vision changes, blurry vision, eye pain  ENT: hearing loss, ringing, dental problems, sinus congestion, sore throat  Resp: SOB, cough, wheeze  CV: chest pain, palpitations, LE swelling  GI: diarrhea/constipation, black/bloody stools, indigestion  : frequency, urgency, dysuria, polydipsia, hematuria  Skin: rashes, wounds, lacerations  Psych: SI, HI, depressed  Endocrine: heat intolerance, cold intolerance    Past Medical History:   Diagnosis Date    Breast cancer     Chronic headaches     Hypertension        Past Surgical History:   Procedure Laterality Date    BREAST BIOPSY      BREAST LUMPECTOMY      BUNIONECTOMY       SECTION         Family History   Problem Relation Age of Onset    Breast cancer Mother     Breast cancer Maternal Aunt     Breast cancer Maternal Aunt     Ovarian cancer Neg Hx     Colon cancer Neg Hx        Social History     Socioeconomic History    Marital status: Single   Tobacco Use    Smoking status: Never Smoker    Smokeless tobacco: Never Used         Current Outpatient Medications:     escitalopram oxalate (LEXAPRO) 10 MG tablet, , Disp: , Rfl:     fremanezumab-vfrm (AJOVY SYRINGE) 225 mg/1.5 mL injection, INJECT INTO MUSCLE EVERY 28 DAYS, Disp: 1.5 mL, Rfl: 3    hydroCHLOROthiazide (HYDRODIURIL) 25 MG  tablet, , Disp: , Rfl:     losartan (COZAAR) 100 MG tablet, , Disp: , Rfl:     prochlorperazine (COMPAZINE) 10 MG tablet, TAKE 1 TABLET(10 MG) BY MOUTH THREE TIMES DAILY AS NEEDED FOR NAUSEA OR HEADACHE, Disp: 60 tablet, Rfl: 0    semaglutide, weight loss, (WEGOVY) 1 mg/0.5 mL PnIj, Inject 1 mg into the skin every 7 days., Disp: 12 mL, Rfl: 0    UBROGEPANT 100 mg tablet, Take by mouth., Disp: , Rfl:     amitriptyline (ELAVIL) 75 MG tablet, Take 1 tablet (75 mg total) by mouth every evening. (Patient not taking: Reported on 6/16/2022), Disp: 30 tablet, Rfl: 3    butorphanol (STADOL) 10 mg/mL nasal spray, ONE SPRAY IN ONE NOSTRIL EVERY 8-12 HOURS IF FIORINAL NOT WORKING FOR HEADACHE, Disp: , Rfl: 0    ketorolac (SPRIX) 15.75 mg/spray Spry, Spray in 1 nostril every 6 hours as needed for severe migraine. Do not exceed 4 times a day or more than 3 times a week (Patient not taking: No sig reported), Disp: 5 each, Rfl: 5    LORazepam (ATIVAN) 1 MG tablet, Take 1 tablet (1 mg total) by mouth once as needed (Take 1 tablet 20-30 min prior to MRI). (Patient not taking: Reported on 6/16/2022), Disp: 1 tablet, Rfl: 0    losartan-hydrochlorothiazide 50-12.5 mg (HYZAAR) 50-12.5 mg per tablet, , Disp: , Rfl:     methylPREDNISolone (MEDROL DOSEPACK) 4 mg tablet, use as directed (Patient not taking: Reported on 6/16/2022), Disp: 1 Package, Rfl: 0    sumatriptan (IMITREX) 100 MG tablet, Take 1 tablet (100 mg total) by mouth as needed for Migraine. Take at the onset of headache, may take 1 more in 1 hour if needed. (Patient not taking: No sig reported), Disp: 12 tablet, Rfl: 5    tiZANidine (ZANAFLEX) 2 MG tablet, Take 2 mg by mouth nightly as needed., Disp: , Rfl:     traMADol (ULTRAM) 50 mg tablet, , Disp: , Rfl:     TROKENDI  mg Cp24, TAKE 1 CAPSULE(100 MG) BY MOUTH EVERY DAY (Patient not taking: Reported on 6/16/2022), Disp: 30 capsule, Rfl: 11        Vitals:    06/16/22 1430   BP: 134/82   Pulse: 95   Temp: 98  °F (36.7 °C)       PHYSICAL EXAM    GEN: NAD, AAox3, well nourished  HEENT: NCAT, EOMI, PEERL, no scleral injection, TM normal, moist mucous membranes, oropharynx clear, no erythema, no exudates  NECK: full rom, no cervical lymphadenopathy, no thyroidmegally  CV: RRR, no m/r/g, trace LE edema  LUNGS: CTAB, non-labored breathing, no wheezes, no crackles  ABD: soft, non-distended, no rebound/guarding, no organomegaly  EXT: n c/c/e, warm, 5/5 UE and LE strength  NEURO: CNII-XII intact no focal deficit  PSYCH: nl affect, no hallucinations, nl speech  Skin: intact, no rashes/lesions/erythema    1. Need for shingles vaccine  - Zoster Recombinant Vaccine    2. Migraine without aura and without status migrainosus, not intractable  Follows with neurology. Continue current management    3. Essential hypertension  - hydroCHLOROthiazide (HYDRODIURIL) 25 MG tablet; Take 1 tablet (25 mg total) by mouth once daily.  Dispense: 90 tablet; Refill: 3  - losartan (COZAAR) 100 MG tablet; Take 1 tablet (100 mg total) by mouth once daily.  Dispense: 90 tablet; Refill: 3    4. Class 2 severe obesity due to excess calories with serious comorbidity and body mass index (BMI) of 38.0 to 38.9 in adult  - semaglutide, weight loss, (WEGOVY) 1.7 mg/0.75 mL PnIj; Inject 1.7 mg into the skin every 7 days.  Dispense: 0.75 mL; Refill: 2    RTC in 6 months      Kandi Vu MD  Family Medicine

## 2022-06-16 NOTE — PROGRESS NOTES
Patient given 1st dose shingrix vaccine via injection. Tolerated well. VIS given & advised to wait in lobby 15 mins for monitoring. Verbalized understanding.

## 2022-06-17 ENCOUNTER — TELEPHONE (OUTPATIENT)
Dept: FAMILY MEDICINE | Facility: CLINIC | Age: 62
End: 2022-06-17
Payer: COMMERCIAL

## 2022-06-17 NOTE — TELEPHONE ENCOUNTER
Call was placed to pt related to medical records release info. Pt needs to provide phone and fax numbers of the party/MD [facility] can request the records from. M for return call to clinic.

## 2022-06-20 ENCOUNTER — LAB VISIT (OUTPATIENT)
Dept: LAB | Facility: HOSPITAL | Age: 62
End: 2022-06-20
Attending: FAMILY MEDICINE
Payer: COMMERCIAL

## 2022-06-20 DIAGNOSIS — Z00.00 GENERAL MEDICAL EXAM: ICD-10-CM

## 2022-06-20 LAB
ALBUMIN SERPL BCP-MCNC: 3.9 G/DL (ref 3.5–5.2)
ALP SERPL-CCNC: 103 U/L (ref 55–135)
ALT SERPL W/O P-5'-P-CCNC: 8 U/L (ref 10–44)
ANION GAP SERPL CALC-SCNC: 9 MMOL/L (ref 8–16)
AST SERPL-CCNC: 10 U/L (ref 10–40)
BASOPHILS # BLD AUTO: 0 K/UL (ref 0–0.2)
BASOPHILS NFR BLD: 0 % (ref 0–1.9)
BILIRUB SERPL-MCNC: 0.6 MG/DL (ref 0.1–1)
BUN SERPL-MCNC: 13 MG/DL (ref 6–20)
CALCIUM SERPL-MCNC: 9.6 MG/DL (ref 8.7–10.5)
CHLORIDE SERPL-SCNC: 100 MMOL/L (ref 95–110)
CHOLEST SERPL-MCNC: 147 MG/DL (ref 120–199)
CHOLEST/HDLC SERPL: 4.7 {RATIO} (ref 2–5)
CO2 SERPL-SCNC: 30 MMOL/L (ref 23–29)
CREAT SERPL-MCNC: 0.9 MG/DL (ref 0.5–1.4)
DIFFERENTIAL METHOD: ABNORMAL
EOSINOPHIL # BLD AUTO: 0.3 K/UL (ref 0–0.5)
EOSINOPHIL NFR BLD: 3.5 % (ref 0–8)
ERYTHROCYTE [DISTWIDTH] IN BLOOD BY AUTOMATED COUNT: 13.2 % (ref 11.5–14.5)
EST. GFR  (AFRICAN AMERICAN): >60 ML/MIN/1.73 M^2
EST. GFR  (NON AFRICAN AMERICAN): >60 ML/MIN/1.73 M^2
ESTIMATED AVG GLUCOSE: 97 MG/DL (ref 68–131)
GLUCOSE SERPL-MCNC: 89 MG/DL (ref 70–110)
HBA1C MFR BLD: 5 % (ref 4–5.6)
HCT VFR BLD AUTO: 46.4 % (ref 37–48.5)
HDLC SERPL-MCNC: 31 MG/DL (ref 40–75)
HDLC SERPL: 21.1 % (ref 20–50)
HGB BLD-MCNC: 14.8 G/DL (ref 12–16)
IMM GRANULOCYTES # BLD AUTO: 0.02 K/UL (ref 0–0.04)
IMM GRANULOCYTES NFR BLD AUTO: 0.2 % (ref 0–0.5)
LDLC SERPL CALC-MCNC: 95.4 MG/DL (ref 63–159)
LYMPHOCYTES # BLD AUTO: 1.4 K/UL (ref 1–4.8)
LYMPHOCYTES NFR BLD: 16.6 % (ref 18–48)
MCH RBC QN AUTO: 31.8 PG (ref 27–31)
MCHC RBC AUTO-ENTMCNC: 31.9 G/DL (ref 32–36)
MCV RBC AUTO: 100 FL (ref 82–98)
MONOCYTES # BLD AUTO: 0.8 K/UL (ref 0.3–1)
MONOCYTES NFR BLD: 9.7 % (ref 4–15)
NEUTROPHILS # BLD AUTO: 6 K/UL (ref 1.8–7.7)
NEUTROPHILS NFR BLD: 70 % (ref 38–73)
NONHDLC SERPL-MCNC: 116 MG/DL
NRBC BLD-RTO: 0 /100 WBC
PLATELET # BLD AUTO: 359 K/UL (ref 150–450)
PMV BLD AUTO: 10.6 FL (ref 9.2–12.9)
POTASSIUM SERPL-SCNC: 3.1 MMOL/L (ref 3.5–5.1)
PROT SERPL-MCNC: 7.9 G/DL (ref 6–8.4)
RBC # BLD AUTO: 4.66 M/UL (ref 4–5.4)
SODIUM SERPL-SCNC: 139 MMOL/L (ref 136–145)
T4 FREE SERPL-MCNC: 1.1 NG/DL (ref 0.71–1.51)
TRIGL SERPL-MCNC: 103 MG/DL (ref 30–150)
TSH SERPL DL<=0.005 MIU/L-ACNC: 2.63 UIU/ML (ref 0.4–4)
WBC # BLD AUTO: 8.63 K/UL (ref 3.9–12.7)

## 2022-06-20 PROCEDURE — 85025 COMPLETE CBC W/AUTO DIFF WBC: CPT | Performed by: FAMILY MEDICINE

## 2022-06-20 PROCEDURE — 80053 COMPREHEN METABOLIC PANEL: CPT | Performed by: FAMILY MEDICINE

## 2022-06-20 PROCEDURE — 84443 ASSAY THYROID STIM HORMONE: CPT | Performed by: FAMILY MEDICINE

## 2022-06-20 PROCEDURE — 36415 COLL VENOUS BLD VENIPUNCTURE: CPT | Mod: PO | Performed by: FAMILY MEDICINE

## 2022-06-20 PROCEDURE — 80061 LIPID PANEL: CPT | Performed by: FAMILY MEDICINE

## 2022-06-20 PROCEDURE — 83036 HEMOGLOBIN GLYCOSYLATED A1C: CPT | Performed by: FAMILY MEDICINE

## 2022-06-20 PROCEDURE — 84439 ASSAY OF FREE THYROXINE: CPT | Performed by: FAMILY MEDICINE

## 2022-06-22 DIAGNOSIS — Z11.59 NEED FOR HEPATITIS C SCREENING TEST: ICD-10-CM

## 2022-06-24 ENCOUNTER — TELEPHONE (OUTPATIENT)
Dept: FAMILY MEDICINE | Facility: CLINIC | Age: 62
End: 2022-06-24
Payer: COMMERCIAL

## 2022-06-24 NOTE — TELEPHONE ENCOUNTER
----- Message from Kandi Vu MD sent at 6/24/2022  6:03 AM CDT -----  Please let patient know that her labs are normal with the exception of her potassium. It is slightly low, she should start taking over-the-counter potassium supplement.

## 2022-06-30 ENCOUNTER — OFFICE VISIT (OUTPATIENT)
Dept: FAMILY MEDICINE | Facility: CLINIC | Age: 62
End: 2022-06-30
Payer: COMMERCIAL

## 2022-06-30 VITALS
WEIGHT: 205 LBS | HEART RATE: 96 BPM | SYSTOLIC BLOOD PRESSURE: 120 MMHG | DIASTOLIC BLOOD PRESSURE: 80 MMHG | BODY MASS INDEX: 37.73 KG/M2 | OXYGEN SATURATION: 98 % | HEIGHT: 62 IN | TEMPERATURE: 98 F

## 2022-06-30 DIAGNOSIS — I10 ESSENTIAL HYPERTENSION: Primary | ICD-10-CM

## 2022-06-30 DIAGNOSIS — G43.009 MIGRAINE WITHOUT AURA AND WITHOUT STATUS MIGRAINOSUS, NOT INTRACTABLE: ICD-10-CM

## 2022-06-30 DIAGNOSIS — E66.01 CLASS 2 SEVERE OBESITY DUE TO EXCESS CALORIES WITH SERIOUS COMORBIDITY AND BODY MASS INDEX (BMI) OF 38.0 TO 38.9 IN ADULT: ICD-10-CM

## 2022-06-30 DIAGNOSIS — F32.A DEPRESSION, UNSPECIFIED DEPRESSION TYPE: ICD-10-CM

## 2022-06-30 PROCEDURE — 1159F PR MEDICATION LIST DOCUMENTED IN MEDICAL RECORD: ICD-10-PCS | Mod: CPTII,S$GLB,, | Performed by: FAMILY MEDICINE

## 2022-06-30 PROCEDURE — 4010F ACE/ARB THERAPY RXD/TAKEN: CPT | Mod: CPTII,S$GLB,, | Performed by: FAMILY MEDICINE

## 2022-06-30 PROCEDURE — 3008F PR BODY MASS INDEX (BMI) DOCUMENTED: ICD-10-PCS | Mod: CPTII,S$GLB,, | Performed by: FAMILY MEDICINE

## 2022-06-30 PROCEDURE — 1160F RVW MEDS BY RX/DR IN RCRD: CPT | Mod: CPTII,S$GLB,, | Performed by: FAMILY MEDICINE

## 2022-06-30 PROCEDURE — 99999 PR PBB SHADOW E&M-EST. PATIENT-LVL IV: CPT | Mod: PBBFAC,,, | Performed by: FAMILY MEDICINE

## 2022-06-30 PROCEDURE — 3008F BODY MASS INDEX DOCD: CPT | Mod: CPTII,S$GLB,, | Performed by: FAMILY MEDICINE

## 2022-06-30 PROCEDURE — 99999 PR PBB SHADOW E&M-EST. PATIENT-LVL IV: ICD-10-PCS | Mod: PBBFAC,,, | Performed by: FAMILY MEDICINE

## 2022-06-30 PROCEDURE — 1159F MED LIST DOCD IN RCRD: CPT | Mod: CPTII,S$GLB,, | Performed by: FAMILY MEDICINE

## 2022-06-30 PROCEDURE — 1160F PR REVIEW ALL MEDS BY PRESCRIBER/CLIN PHARMACIST DOCUMENTED: ICD-10-PCS | Mod: CPTII,S$GLB,, | Performed by: FAMILY MEDICINE

## 2022-06-30 PROCEDURE — 99215 OFFICE O/P EST HI 40 MIN: CPT | Mod: S$GLB,,, | Performed by: FAMILY MEDICINE

## 2022-06-30 PROCEDURE — 99215 PR OFFICE/OUTPT VISIT, EST, LEVL V, 40-54 MIN: ICD-10-PCS | Mod: S$GLB,,, | Performed by: FAMILY MEDICINE

## 2022-06-30 PROCEDURE — 3074F SYST BP LT 130 MM HG: CPT | Mod: CPTII,S$GLB,, | Performed by: FAMILY MEDICINE

## 2022-06-30 PROCEDURE — 4010F PR ACE/ARB THEARPY RXD/TAKEN: ICD-10-PCS | Mod: CPTII,S$GLB,, | Performed by: FAMILY MEDICINE

## 2022-06-30 PROCEDURE — 3079F PR MOST RECENT DIASTOLIC BLOOD PRESSURE 80-89 MM HG: ICD-10-PCS | Mod: CPTII,S$GLB,, | Performed by: FAMILY MEDICINE

## 2022-06-30 PROCEDURE — 3079F DIAST BP 80-89 MM HG: CPT | Mod: CPTII,S$GLB,, | Performed by: FAMILY MEDICINE

## 2022-06-30 PROCEDURE — 3044F HG A1C LEVEL LT 7.0%: CPT | Mod: CPTII,S$GLB,, | Performed by: FAMILY MEDICINE

## 2022-06-30 PROCEDURE — 3044F PR MOST RECENT HEMOGLOBIN A1C LEVEL <7.0%: ICD-10-PCS | Mod: CPTII,S$GLB,, | Performed by: FAMILY MEDICINE

## 2022-06-30 PROCEDURE — 3074F PR MOST RECENT SYSTOLIC BLOOD PRESSURE < 130 MM HG: ICD-10-PCS | Mod: CPTII,S$GLB,, | Performed by: FAMILY MEDICINE

## 2022-06-30 RX ORDER — ESCITALOPRAM OXALATE 10 MG/1
10 TABLET ORAL 2 TIMES DAILY
Qty: 180 TABLET | Refills: 1 | Status: SHIPPED | OUTPATIENT
Start: 2022-06-30 | End: 2022-07-12 | Stop reason: SDUPTHER

## 2022-06-30 RX ORDER — SEMAGLUTIDE 1.7 MG/.75ML
1.7 INJECTION, SOLUTION SUBCUTANEOUS
Qty: 0.75 ML | Refills: 2 | Status: SHIPPED | OUTPATIENT
Start: 2022-06-30 | End: 2022-07-11 | Stop reason: SDUPTHER

## 2022-06-30 NOTE — PROGRESS NOTES
"Routine Office Visit    Makenna Candelaria  1960  [id number]      Subjective     Makenna is a 62 y.o. female who presents today for:    1. New to me / establish care / Patient was told she needed to establish care with me to get weight loss medication refill. She would like to see one provider for her medications   2. lexapro - Patient requesting refill. Doing well on current regimen. No side effects.   3. Weight - Patient was placed on wegovy by another provider. She has been on it for a few months. She started at 225# and is now down 20 pounds.     6/30/2022 - 205  Weight goal - 150    Sleep - 7-8 hours   Exercise - none   Work - , collecting taxes. Patient drives and walks       Objective     Review of Systems   Constitutional: Negative for chills and fever.   HENT: Negative for congestion.    Eyes: Negative for blurred vision.   Respiratory: Negative for cough.    Cardiovascular: Negative for chest pain.   Gastrointestinal: Negative for abdominal pain, constipation, diarrhea, heartburn, nausea and vomiting.   Genitourinary: Negative for dysuria.   Musculoskeletal: Negative for myalgias.   Skin: Negative for itching and rash.   Neurological: Negative for dizziness and headaches.   Psychiatric/Behavioral: Negative for depression.       /80 (BP Location: Left arm, Patient Position: Sitting, BP Method: Large (Manual))   Pulse 96   Temp 97.5 °F (36.4 °C) (Oral)   Ht 5' 2" (1.575 m)   Wt 93 kg (205 lb 0.4 oz)   SpO2 98%   BMI 37.50 kg/m²   Physical Exam  Constitutional:       Appearance: She is well-developed.   HENT:      Head: Normocephalic and atraumatic.   Eyes:      Conjunctiva/sclera: Conjunctivae normal.      Pupils: Pupils are equal, round, and reactive to light.   Cardiovascular:      Rate and Rhythm: Normal rate and regular rhythm.      Heart sounds: Normal heart sounds. No murmur heard.    No friction rub. No gallop.   Pulmonary:      Effort: No respiratory distress.      Breath sounds: " Normal breath sounds.   Abdominal:      General: Bowel sounds are normal. There is no distension.      Palpations: Abdomen is soft.      Tenderness: There is no abdominal tenderness.   Musculoskeletal:         General: Normal range of motion.      Cervical back: Normal range of motion and neck supple.   Lymphadenopathy:      Cervical: No cervical adenopathy.   Skin:     General: Skin is warm.   Neurological:      Mental Status: She is alert and oriented to person, place, and time.           Assessment     Problem List Items Addressed This Visit        Neuro    Migraines    Overview     Patient is losing headache control on her current dose of topiramate.  She has had improved prevention with Elavil. Patient asked to consider Ubrelvy as her abortive therapy.             Other Visit Diagnoses     Essential hypertension    -  Primary  The current medical regimen is effective;  continue present plan and medications.      Class 2 severe obesity due to excess calories with serious comorbidity and body mass index (BMI) of 38.0 to 38.9 in adult        Relevant Medications    semaglutide, weight loss, (WEGOVY) 1.7 mg/0.75 mL PnIj  Refill x 1 month  Schedule weight loss appointment       Depression, unspecified depression type        Relevant Medications    EScitalopram oxalate (LEXAPRO) 10 MG tablet  The current medical regimen is effective;  continue present plan and medications.            Follow up in about 4 weeks (around 7/28/2022), or if symptoms worsen or fail to improve.

## 2022-07-11 DIAGNOSIS — E66.01 CLASS 2 SEVERE OBESITY DUE TO EXCESS CALORIES WITH SERIOUS COMORBIDITY AND BODY MASS INDEX (BMI) OF 38.0 TO 38.9 IN ADULT: ICD-10-CM

## 2022-07-11 RX ORDER — SEMAGLUTIDE 1.7 MG/.75ML
1.7 INJECTION, SOLUTION SUBCUTANEOUS
Qty: 0.75 ML | Refills: 2 | Status: SHIPPED | OUTPATIENT
Start: 2022-07-11 | End: 2022-08-05

## 2022-07-11 NOTE — TELEPHONE ENCOUNTER
No new care gaps identified.  [facility] Embedded Care Gaps. Reference number: [id number]. 7/11/2022   9:31:44 AM JEANET

## 2022-07-11 NOTE — TELEPHONE ENCOUNTER
----- Message from Jose Garcia sent at 7/11/2022  9:00 AM CDT -----  .Type: RX Refill Request    Who Called: self    Have you contacted your pharmacy:yes    Refill or New Rx:refill    RX Name and Strength:semaglutide, weight loss, (WEGOVY) 1.7 mg/0.75 mL Darell    Preferred Pharmacy with phone number:[id number] - [place] - [address] AT [address]   Phone:  [phone]  Fax:  [phone]    Local or Mail Order:local    Ordering Provider:dr narvaez    Would the patient rather a call back or a response via [facility]? Call she says [facility] is not working right now    Best Call Back Number:.[phone] (home)       Additional Information: Patient says she needs the next level of the WEGOVY t eh 2.0 stronger dosage. Patient also states that the pharmacy told her that she cannot get her EScitalopram oxalate (LEXAPRO) 10 MG tablet refilled because they told her that she needs the prescription to say 20 mg once  day

## 2022-07-12 ENCOUNTER — TELEPHONE (OUTPATIENT)
Dept: FAMILY MEDICINE | Facility: CLINIC | Age: 62
End: 2022-07-12
Payer: COMMERCIAL

## 2022-07-12 DIAGNOSIS — F32.A DEPRESSION, UNSPECIFIED DEPRESSION TYPE: ICD-10-CM

## 2022-07-12 RX ORDER — ESCITALOPRAM OXALATE 20 MG/1
20 TABLET ORAL DAILY
Qty: 90 TABLET | Refills: 1 | Status: SHIPPED | OUTPATIENT
Start: 2022-07-12 | End: 2022-12-29

## 2022-07-12 NOTE — TELEPHONE ENCOUNTER
----- Message from Kailey Cortes sent at 7/12/2022  9:56 AM CDT -----  Type: RX Refill Request    Who Called:  self     Have you contacted your pharmacy: yes, dosage is incorrect     Refill or New Rx: New Rx     RX Name and Strength: semaglutide, weight loss, (WEGOVY) 1.7 mg/0.75 mL PnIj,  ^^^ PT NEED 20MG , THEY WILL NOT PAY FOR THE 1.7 MG REFILL ^^^    EScitalopram oxalate (LEXAPRO) 10 MG tablet, ^^^^HAS TO BE 20MG TABLET ONCE A DAY ^^^^    How is the patient currently taking it? (ex. 1XDay):    Is this a 30 day or 90 day RX:    Preferred Pharmacy with phone number:      [id number] - [place] - [address] AT [facility] [address]  [address] [address]  Phone: [phone] Fax: [phone]    Please call patient [phone] (home)

## 2022-07-14 ENCOUNTER — TELEPHONE (OUTPATIENT)
Dept: FAMILY MEDICINE | Facility: CLINIC | Age: 62
End: 2022-07-14
Payer: COMMERCIAL

## 2022-07-14 NOTE — TELEPHONE ENCOUNTER
----- Message from Lalitha Holder sent at 7/14/2022 11:09 AM CDT -----  Regarding: self [phone]  Type: Patient Call Back    Who called: self    What is the request in detail: patient stated she is still waiting on a response about her medication she calls every day and has had no call back. A PA is required for medication.    Can the clinic reply by [facility]? no    Would the patient rather a call back or a response via [facility]?  Call back    Best call back number: [phone]

## 2022-07-14 NOTE — TELEPHONE ENCOUNTER
We did not discuss increasing the wegovy at her office visit   She was suppose to schedule an actual weight loss appointment with me - which is scheduled for august 5th     I need to have documentation in order to have a prior authorization completed     She should have refills for wegovy 1.7mg

## 2022-07-14 NOTE — TELEPHONE ENCOUNTER
Patient states the medication of (WEGOVY) was typed wrong its not 20 mg and it is  2.4. patient states she finished the 1.7 last Friday, and she is suppose to get another shot this Friday. States the 5th one is 2.4 and insurance need a PA  Please advise.

## 2022-07-14 NOTE — TELEPHONE ENCOUNTER
----- Message from Sonia Rodrigez sent at 7/14/2022 12:27 PM CDT -----  Regarding: request for a sooner appt/ medication is too expensive  Type:  Sooner Appointment Request    Patient is requesting a sooner appointment.  Patient declined first available appointment listed as well as another facility and provider .  Patient will not accept being placed on the waitlist and is requesting a message be sent to doctor.    Name of Caller: Makenna     When is the first available appointment? 8/5    Symptoms: follow up/ medication is too expensive     Would the patient rather a call back or a response via [facility]? Call back     Best Call Back Number: [phone]    Additional Information: the patient is requesting a sooner appt. She is scheduled for 8/5 but would like to be seen sooner. She also stated that the medication given to her is too expensive. Please return call at earliest convenience.

## 2022-08-05 ENCOUNTER — OFFICE VISIT (OUTPATIENT)
Dept: FAMILY MEDICINE | Facility: CLINIC | Age: 62
End: 2022-08-05
Payer: COMMERCIAL

## 2022-08-05 VITALS
TEMPERATURE: 98 F | WEIGHT: 200.38 LBS | OXYGEN SATURATION: 94 % | HEART RATE: 95 BPM | DIASTOLIC BLOOD PRESSURE: 80 MMHG | BODY MASS INDEX: 36.87 KG/M2 | SYSTOLIC BLOOD PRESSURE: 120 MMHG | HEIGHT: 62 IN

## 2022-08-05 DIAGNOSIS — I10 ESSENTIAL HYPERTENSION: Primary | ICD-10-CM

## 2022-08-05 DIAGNOSIS — E66.01 CLASS 2 SEVERE OBESITY DUE TO EXCESS CALORIES WITH SERIOUS COMORBIDITY AND BODY MASS INDEX (BMI) OF 38.0 TO 38.9 IN ADULT: ICD-10-CM

## 2022-08-05 PROCEDURE — 1159F PR MEDICATION LIST DOCUMENTED IN MEDICAL RECORD: ICD-10-PCS | Mod: CPTII,S$GLB,, | Performed by: FAMILY MEDICINE

## 2022-08-05 PROCEDURE — 1160F PR REVIEW ALL MEDS BY PRESCRIBER/CLIN PHARMACIST DOCUMENTED: ICD-10-PCS | Mod: CPTII,S$GLB,, | Performed by: FAMILY MEDICINE

## 2022-08-05 PROCEDURE — 99214 PR OFFICE/OUTPT VISIT, EST, LEVL IV, 30-39 MIN: ICD-10-PCS | Mod: S$GLB,,, | Performed by: FAMILY MEDICINE

## 2022-08-05 PROCEDURE — 3079F DIAST BP 80-89 MM HG: CPT | Mod: CPTII,S$GLB,, | Performed by: FAMILY MEDICINE

## 2022-08-05 PROCEDURE — 3079F PR MOST RECENT DIASTOLIC BLOOD PRESSURE 80-89 MM HG: ICD-10-PCS | Mod: CPTII,S$GLB,, | Performed by: FAMILY MEDICINE

## 2022-08-05 PROCEDURE — 3044F HG A1C LEVEL LT 7.0%: CPT | Mod: CPTII,S$GLB,, | Performed by: FAMILY MEDICINE

## 2022-08-05 PROCEDURE — 99999 PR PBB SHADOW E&M-EST. PATIENT-LVL IV: CPT | Mod: PBBFAC,,, | Performed by: FAMILY MEDICINE

## 2022-08-05 PROCEDURE — 3074F SYST BP LT 130 MM HG: CPT | Mod: CPTII,S$GLB,, | Performed by: FAMILY MEDICINE

## 2022-08-05 PROCEDURE — 1160F RVW MEDS BY RX/DR IN RCRD: CPT | Mod: CPTII,S$GLB,, | Performed by: FAMILY MEDICINE

## 2022-08-05 PROCEDURE — 3008F PR BODY MASS INDEX (BMI) DOCUMENTED: ICD-10-PCS | Mod: CPTII,S$GLB,, | Performed by: FAMILY MEDICINE

## 2022-08-05 PROCEDURE — 99999 PR PBB SHADOW E&M-EST. PATIENT-LVL IV: ICD-10-PCS | Mod: PBBFAC,,, | Performed by: FAMILY MEDICINE

## 2022-08-05 PROCEDURE — 4010F ACE/ARB THERAPY RXD/TAKEN: CPT | Mod: CPTII,S$GLB,, | Performed by: FAMILY MEDICINE

## 2022-08-05 PROCEDURE — 3008F BODY MASS INDEX DOCD: CPT | Mod: CPTII,S$GLB,, | Performed by: FAMILY MEDICINE

## 2022-08-05 PROCEDURE — 99214 OFFICE O/P EST MOD 30 MIN: CPT | Mod: S$GLB,,, | Performed by: FAMILY MEDICINE

## 2022-08-05 PROCEDURE — 3044F PR MOST RECENT HEMOGLOBIN A1C LEVEL <7.0%: ICD-10-PCS | Mod: CPTII,S$GLB,, | Performed by: FAMILY MEDICINE

## 2022-08-05 PROCEDURE — 3074F PR MOST RECENT SYSTOLIC BLOOD PRESSURE < 130 MM HG: ICD-10-PCS | Mod: CPTII,S$GLB,, | Performed by: FAMILY MEDICINE

## 2022-08-05 PROCEDURE — 4010F PR ACE/ARB THEARPY RXD/TAKEN: ICD-10-PCS | Mod: CPTII,S$GLB,, | Performed by: FAMILY MEDICINE

## 2022-08-05 PROCEDURE — 1159F MED LIST DOCD IN RCRD: CPT | Mod: CPTII,S$GLB,, | Performed by: FAMILY MEDICINE

## 2022-08-05 RX ORDER — SEMAGLUTIDE 2.4 MG/.75ML
2.4 INJECTION, SOLUTION SUBCUTANEOUS
Qty: 3 ML | Refills: 3 | Status: SHIPPED | OUTPATIENT
Start: 2022-08-05 | End: 2022-11-10

## 2022-08-05 NOTE — PROGRESS NOTES
"Routine Office Visit    Makenna Candelaria  1960  [id number]      Subjective     Makenna is a 62 y.o. female who presents today for:    1. Weight - Patient was placed on wegovy by another provider. She has been on it for a few months. She started at 225# and is now down 20 pounds. Patient started wegovy in February 2022.     Diets tried - weight watchers, sensible meals; Mediterranean diet.   Medications tried - adipex - Patient was not able to sleep with medication. She felt side effects of anxiety.     6/30/2022 - 205 8/5/2022 - 200    Weight goal - 150     Sleep - 7-8 hours     Exercise - none; working on house; so Patient is active.     Work - , collecting taxes. Patient drives and walks     24 hour recall   Breakfast - yogurt and coffee   Dinner - chips, candy bar   Lunch - chinese food   Snacks - banana     Objective     Review of Systems   Constitutional: Negative for chills and fever.   HENT: Negative for congestion.    Eyes: Negative for blurred vision.   Respiratory: Negative for cough.    Cardiovascular: Negative for chest pain.   Gastrointestinal: Negative for abdominal pain, constipation, diarrhea, heartburn, nausea and vomiting.   Genitourinary: Negative for dysuria.   Musculoskeletal: Negative for myalgias.   Skin: Negative for itching and rash.   Neurological: Negative for dizziness and headaches.   Psychiatric/Behavioral: Negative for depression.       /80 (BP Location: Left arm, Patient Position: Sitting, BP Method: Large (Manual))   Pulse 95   Temp 97.7 °F (36.5 °C) (Oral)   Ht 5' 2" (1.575 m)   Wt 90.9 kg (200 lb 6.4 oz)   SpO2 (!) 94%   BMI 36.65 kg/m²   Physical Exam  Constitutional:       Appearance: She is well-developed.   HENT:      Head: Normocephalic and atraumatic.   Eyes:      Conjunctiva/sclera: Conjunctivae normal.      Pupils: Pupils are equal, round, and reactive to light.   Cardiovascular:      Rate and Rhythm: Normal rate and regular rhythm.      Heart sounds: " Normal heart sounds. No murmur heard.    No friction rub. No gallop.   Pulmonary:      Effort: No respiratory distress.      Breath sounds: Normal breath sounds.   Abdominal:      General: Bowel sounds are normal. There is no distension.      Palpations: Abdomen is soft.      Tenderness: There is no abdominal tenderness.   Musculoskeletal:         General: Normal range of motion.      Cervical back: Normal range of motion and neck supple.   Lymphadenopathy:      Cervical: No cervical adenopathy.   Skin:     General: Skin is warm.   Neurological:      Mental Status: She is alert and oriented to person, place, and time.           Assessment     Problem List Items Addressed This Visit    None     Visit Diagnoses     Essential hypertension    -  Primary  The current medical regimen is effective;  continue present plan and medications.      Class 2 severe obesity due to excess calories with serious comorbidity and body mass index (BMI) of 38.0 to 38.9 in adult        Relevant Medications    semaglutide, weight loss, (WEGOVY) 2.4 mg/0.75 mL PnIj  Increase semaglutide   Common side effects of this medication were discussed with the patient. Questions regarding medications were discussed during this visit.   Recommend consistency with diet   Increase activity   Consider meal prepping   Increase vegetables             Follow up in about 3 months (around 11/5/2022), or if symptoms worsen or fail to improve.

## 2022-08-29 DIAGNOSIS — G43.009 MIGRAINE WITHOUT AURA AND WITHOUT STATUS MIGRAINOSUS, NOT INTRACTABLE: Primary | ICD-10-CM

## 2022-08-31 RX ORDER — UBROGEPANT 100 MG/1
100 TABLET ORAL ONCE AS NEEDED
Qty: 16 TABLET | Refills: 2 | Status: SHIPPED | OUTPATIENT
Start: 2022-08-31 | End: 2022-08-31

## 2022-09-08 ENCOUNTER — TELEPHONE (OUTPATIENT)
Dept: FAMILY MEDICINE | Facility: CLINIC | Age: 62
End: 2022-09-08
Payer: COMMERCIAL

## 2022-09-08 NOTE — TELEPHONE ENCOUNTER
----- Message from Carolyn Man sent at 9/8/2022  4:48 PM CDT -----  Type: Patient Call Back    Who called:[employer]/ [place]    What is the request in detail: Has clinical questions to complete the appeal. Case # [id number]    semaglutide, weight loss, (WEGOVY) 2.4 mg/0.75 mL PnIj    Can the clinic reply by [facility]?    Would the patient rather a call back or a response via [facility]? Call back    Best call back number:[phone]     Additional Information:

## 2022-09-15 ENCOUNTER — CLINICAL SUPPORT (OUTPATIENT)
Dept: FAMILY MEDICINE | Facility: CLINIC | Age: 62
End: 2022-09-15
Payer: COMMERCIAL

## 2022-09-15 ENCOUNTER — TELEPHONE (OUTPATIENT)
Dept: FAMILY MEDICINE | Facility: CLINIC | Age: 62
End: 2022-09-15

## 2022-09-15 DIAGNOSIS — Z23 NEED FOR SHINGLES VACCINE: Primary | ICD-10-CM

## 2022-09-15 PROCEDURE — 99999 PR PBB SHADOW E&M-EST. PATIENT-LVL I: CPT | Mod: PBBFAC,,,

## 2022-09-15 PROCEDURE — 90750 ZOSTER RECOMBINANT VACCINE: ICD-10-PCS | Mod: S$GLB,,, | Performed by: FAMILY MEDICINE

## 2022-09-15 PROCEDURE — 99999 PR PBB SHADOW E&M-EST. PATIENT-LVL I: ICD-10-PCS | Mod: PBBFAC,,,

## 2022-09-15 PROCEDURE — 90750 HZV VACC RECOMBINANT IM: CPT | Mod: S$GLB,,, | Performed by: FAMILY MEDICINE

## 2022-09-16 ENCOUNTER — TELEPHONE (OUTPATIENT)
Dept: FAMILY MEDICINE | Facility: CLINIC | Age: 62
End: 2022-09-16
Payer: COMMERCIAL

## 2022-09-16 NOTE — TELEPHONE ENCOUNTER
----- Message from Lalitha Holder sent at 9/16/2022  3:26 PM CDT -----  Regarding: Paul [phone]  Type:  Patient Returning Call    Who Called: [facility] [employer]     Who Left Message for Patient: Roxanne    Does the patient know what this is regarding?:    Would the patient rather a call back or a response via [facility]? Call back    Best Call Back Number:  [phone] case id [id number]

## 2022-09-16 NOTE — TELEPHONE ENCOUNTER
Returned call to [employer] appeal line in regards to patient's Wegovy medication. The line only has a voicemail and does not have a direct contact line. If they call back PLEASE do not take a message and PLEASE connect patient to me or Dr. Byrnes's nurse or MA. Neither side of the call has a direct line and it is delaying a patient's prior authorization.

## 2022-10-11 ENCOUNTER — PATIENT MESSAGE (OUTPATIENT)
Dept: ADMINISTRATIVE | Facility: HOSPITAL | Age: 62
End: 2022-10-11
Payer: COMMERCIAL

## 2022-10-17 ENCOUNTER — TELEPHONE (OUTPATIENT)
Dept: FAMILY MEDICINE | Facility: CLINIC | Age: 62
End: 2022-10-17
Payer: COMMERCIAL

## 2022-11-09 ENCOUNTER — TELEPHONE (OUTPATIENT)
Dept: FAMILY MEDICINE | Facility: CLINIC | Age: 62
End: 2022-11-09
Payer: COMMERCIAL

## 2022-11-09 NOTE — TELEPHONE ENCOUNTER
----- Message from Joanne Meneses sent at 11/9/2022  8:18 AM CST -----  Contact: Patient [phone]  .Name of Caller Patient    Reason for Visit/Symptoms Weight Loss- Follow up on medications  Best Contact Number or Confirm if [facility] Preferred [phone]  Preferred Date/Time of Appointment As soon as she can get in.   Interested in Virtual Visit (yes/no) No  Additional Information States she's on her last Wegovy 2.4. She's at a stand still and may need something to add to the shot. Please call.

## 2022-11-09 NOTE — TELEPHONE ENCOUNTER
Spoke with patient scheduled to see Dr. Byrnes on Thursday 11-17-22 at 11:00 am. Patient verbalized understanding at this time.

## 2022-11-10 DIAGNOSIS — E66.01 CLASS 2 SEVERE OBESITY DUE TO EXCESS CALORIES WITH SERIOUS COMORBIDITY AND BODY MASS INDEX (BMI) OF 38.0 TO 38.9 IN ADULT: ICD-10-CM

## 2022-11-10 RX ORDER — SEMAGLUTIDE 2.4 MG/.75ML
INJECTION, SOLUTION SUBCUTANEOUS
Qty: 9 ML | Refills: 0 | Status: SHIPPED | OUTPATIENT
Start: 2022-11-10 | End: 2023-01-13 | Stop reason: SDUPTHER

## 2022-11-10 NOTE — TELEPHONE ENCOUNTER
Care Due:                  Date            Visit Type   Department     Provider  --------------------------------------------------------------------------------                                [facility]                              PRIMARY      MED/ INTERNAL  Last Visit: 08-      CARE ([facility])   MED/ PEDS      Elizabeth Byrnes                              [facility]                              PRIMARY      MED/ INTERNAL  Next Visit: 11-      CARE ([facility])   MED/ PEDS      Elizabeth Byrnes                                                            Last  Test          Frequency    Reason                     Performed    Due Date  --------------------------------------------------------------------------------    HBA1C.......  6 months...  semaglutide,.............  06- 12-    [employer] Embedded Care Gaps. Reference number: [id number]. 11/10/2022   10:22:43 AM CST

## 2022-11-10 NOTE — TELEPHONE ENCOUNTER
Refill Decision Note   Makenna Teagan  is requesting a refill authorization.  Brief Assessment and Rationale for Refill:  Approve     Medication Therapy Plan:       Medication Reconciliation Completed: No   Comments:     No Care Gaps recommended.     Note composed:1:15 PM 11/10/2022

## 2022-11-18 ENCOUNTER — TELEPHONE (OUTPATIENT)
Dept: FAMILY MEDICINE | Facility: CLINIC | Age: 62
End: 2022-11-18
Payer: COMMERCIAL

## 2022-11-18 NOTE — TELEPHONE ENCOUNTER
Called patient and patient states she's not going to be able to make her appointment that's scheduled for 11/18 at 10:20 am. Rescheduled appointment 03/23 at 10:20 am appointment added to the wait list.

## 2022-11-18 NOTE — TELEPHONE ENCOUNTER
----- Message from Love Baca sent at 11/18/2022  8:39 AM CST -----  Regarding: patient call back  Type: Patient Call Back    Who called: Self     What is the request in detail: Patient would like to see if her appointment can be moved to Monday the 21st. She is not able to make it to today's apt.     Can the clinic reply by [facility]? no    Would the patient rather a call back or a response via [facility]? Call     Best call back number: .[phone]      Additional Information:

## 2022-12-28 DIAGNOSIS — F32.A DEPRESSION, UNSPECIFIED DEPRESSION TYPE: ICD-10-CM

## 2022-12-29 RX ORDER — ESCITALOPRAM OXALATE 20 MG/1
TABLET ORAL
Qty: 90 TABLET | Refills: 2 | Status: SHIPPED | OUTPATIENT
Start: 2022-12-29 | End: 2022-12-30

## 2022-12-29 NOTE — TELEPHONE ENCOUNTER
Refill Decision Note   Makenna Sanchezkins  is requesting a refill authorization.  Brief Assessment and Rationale for Refill:  Approve     Medication Therapy Plan:       Medication Reconciliation Completed: No   Comments:     No Care Gaps recommended.     Note composed:9:45 AM 12/29/2022

## 2022-12-29 NOTE — TELEPHONE ENCOUNTER
No new care gaps identified.  [facility] Embedded Care Gaps. Reference number: [id number]. 12/28/2022   9:09:42 PM CST

## 2022-12-30 RX ORDER — ESCITALOPRAM OXALATE 20 MG/1
20 TABLET ORAL DAILY
Qty: 90 TABLET | Refills: 0 | Status: SHIPPED | OUTPATIENT
Start: 2022-12-30 | End: 2023-12-08

## 2022-12-30 NOTE — TELEPHONE ENCOUNTER
Lexapro order discontinued due to cosign declined by Dr. Briones with comment stating  Year supply not recommended . Pended 90-day supply with no refills for your review.      Pended Medication(s)   Requested Prescriptions     Pending Prescriptions Disp Refills    EScitalopram oxalate (LEXAPRO) 20 MG tablet 90 tablet 0     Sig: Take 1 tablet (20 mg total) by mouth once daily.

## 2023-01-09 ENCOUNTER — PATIENT MESSAGE (OUTPATIENT)
Dept: ADMINISTRATIVE | Facility: HOSPITAL | Age: 63
End: 2023-01-09
Payer: COMMERCIAL

## 2023-01-13 DIAGNOSIS — E66.01 CLASS 2 SEVERE OBESITY DUE TO EXCESS CALORIES WITH SERIOUS COMORBIDITY AND BODY MASS INDEX (BMI) OF 38.0 TO 38.9 IN ADULT: ICD-10-CM

## 2023-01-13 NOTE — TELEPHONE ENCOUNTER
Refill Routing Note   Medication(s) are not appropriate for processing by [facility] for the following reason(s):      - Required laboratory values are outdated    [facility] action(s):  Defer Medication-related problems identified: Requires labs        Medication reconciliation completed: No     Appointments  past 12m or future 3m with PCP    Date Provider   Last Visit   8/5/2022 Elizabeth Byrnes MD   Next Visit   3/23/2023 Elizabeth Byrnes MD   ED visits in past 90 days: 0        Note composed:4:02 PM 01/13/2023

## 2023-01-13 NOTE — TELEPHONE ENCOUNTER
Care Due:                  Date            Visit Type   Department     Provider  --------------------------------------------------------------------------------                                [facility]                              PRIMARY      MED/ INTERNAL  Last Visit: 08-      CARE ([facility])   MED/ PEDS      Elizabeth Byrnes                              [facility]                              PRIMARY      MED/ INTERNAL  Next Visit: 03-      CARE ([facility])   MED/ PEDS      Elizabeth Byrnes                                                            Last  Test          Frequency    Reason                     Performed    Due Date  --------------------------------------------------------------------------------    HBA1C.......  6 months...  semaglutide,.............  06- 12-    [employer] Embedded Care Gaps. Reference number: [id number]. 1/13/2023   1:07:15 PM CST

## 2023-01-13 NOTE — TELEPHONE ENCOUNTER
----- Message from Gil Omkar sent at 1/13/2023 11:47 AM CST -----  Regarding: Self [phone]  Type: RX Refill Request    Who Called:  Self    Have you contacted your pharmacy: no    Refill or New Rx: Refill     RX Name and Strength: semaglutide, weight loss, (WEGOVY) 2.4 mg/0.75 mL PnIj    Preferred Pharmacy with phone number: [id number] - [place], [place] - [address] [facility] [address] [address] [phone]    Local or Mail Order: local    Would the patient rather a call back or a response via [facility]? Call back    Best Call Back Number: [phone]    Additional Information:     Thank you.

## 2023-01-17 RX ORDER — SEMAGLUTIDE 2.4 MG/.75ML
INJECTION, SOLUTION SUBCUTANEOUS
Qty: 9 ML | Refills: 0 | Status: SHIPPED | OUTPATIENT
Start: 2023-01-17 | End: 2023-03-23 | Stop reason: SDUPTHER

## 2023-02-01 ENCOUNTER — OFFICE VISIT (OUTPATIENT)
Dept: URGENT CARE | Facility: CLINIC | Age: 63
End: 2023-02-01
Payer: COMMERCIAL

## 2023-02-01 VITALS
BODY MASS INDEX: 36.8 KG/M2 | TEMPERATURE: 98 F | RESPIRATION RATE: 18 BRPM | HEIGHT: 62 IN | WEIGHT: 200 LBS | SYSTOLIC BLOOD PRESSURE: 156 MMHG | OXYGEN SATURATION: 97 % | DIASTOLIC BLOOD PRESSURE: 95 MMHG | HEART RATE: 80 BPM

## 2023-02-01 DIAGNOSIS — R05.1 ACUTE COUGH: ICD-10-CM

## 2023-02-01 DIAGNOSIS — J01.90 ACUTE SINUSITIS WITH SYMPTOMS GREATER THAN 10 DAYS: Primary | ICD-10-CM

## 2023-02-01 PROBLEM — M17.11 DEGENERATIVE JOINT DISEASE OF RIGHT KNEE: Status: ACTIVE | Noted: 2019-12-06

## 2023-02-01 PROBLEM — D05.11 DUCTAL CARCINOMA IN SITU (DCIS) OF RIGHT BREAST: Status: ACTIVE | Noted: 2021-12-27

## 2023-02-01 PROBLEM — N63.20 LEFT BREAST MASS: Status: ACTIVE | Noted: 2021-12-30

## 2023-02-01 PROBLEM — Z85.3 HISTORY OF BREAST CANCER: Status: ACTIVE | Noted: 2020-01-02

## 2023-02-01 PROBLEM — M71.21 BAKER CYST, RIGHT: Status: ACTIVE | Noted: 2019-12-06

## 2023-02-01 PROBLEM — R00.2 PALPITATIONS: Status: ACTIVE | Noted: 2020-01-02

## 2023-02-01 PROBLEM — E78.00 ELEVATED CHOLESTEROL: Status: ACTIVE | Noted: 2020-01-02

## 2023-02-01 PROBLEM — R63.8 INCREASED BMI: Status: ACTIVE | Noted: 2020-12-09

## 2023-02-01 PROBLEM — R94.31 ABNORMAL ELECTROCARDIOGRAM: Status: ACTIVE | Noted: 2020-01-02

## 2023-02-01 PROBLEM — I10 ESSENTIAL HYPERTENSION: Status: ACTIVE | Noted: 2019-12-06

## 2023-02-01 PROBLEM — R06.09 DOE (DYSPNEA ON EXERTION): Status: ACTIVE | Noted: 2020-01-02

## 2023-02-01 PROCEDURE — 3077F PR MOST RECENT SYSTOLIC BLOOD PRESSURE >= 140 MM HG: ICD-10-PCS | Mod: CPTII,S$GLB,, | Performed by: FAMILY MEDICINE

## 2023-02-01 PROCEDURE — 99203 OFFICE O/P NEW LOW 30 MIN: CPT | Mod: S$GLB,,, | Performed by: FAMILY MEDICINE

## 2023-02-01 PROCEDURE — 3080F PR MOST RECENT DIASTOLIC BLOOD PRESSURE >= 90 MM HG: ICD-10-PCS | Mod: CPTII,S$GLB,, | Performed by: FAMILY MEDICINE

## 2023-02-01 PROCEDURE — 3008F BODY MASS INDEX DOCD: CPT | Mod: CPTII,S$GLB,, | Performed by: FAMILY MEDICINE

## 2023-02-01 PROCEDURE — 1159F MED LIST DOCD IN RCRD: CPT | Mod: CPTII,S$GLB,, | Performed by: FAMILY MEDICINE

## 2023-02-01 PROCEDURE — 1159F PR MEDICATION LIST DOCUMENTED IN MEDICAL RECORD: ICD-10-PCS | Mod: CPTII,S$GLB,, | Performed by: FAMILY MEDICINE

## 2023-02-01 PROCEDURE — 1160F PR REVIEW ALL MEDS BY PRESCRIBER/CLIN PHARMACIST DOCUMENTED: ICD-10-PCS | Mod: CPTII,S$GLB,, | Performed by: FAMILY MEDICINE

## 2023-02-01 PROCEDURE — 3077F SYST BP >= 140 MM HG: CPT | Mod: CPTII,S$GLB,, | Performed by: FAMILY MEDICINE

## 2023-02-01 PROCEDURE — 99203 PR OFFICE/OUTPT VISIT, NEW, LEVL III, 30-44 MIN: ICD-10-PCS | Mod: S$GLB,,, | Performed by: FAMILY MEDICINE

## 2023-02-01 PROCEDURE — 3080F DIAST BP >= 90 MM HG: CPT | Mod: CPTII,S$GLB,, | Performed by: FAMILY MEDICINE

## 2023-02-01 PROCEDURE — 3008F PR BODY MASS INDEX (BMI) DOCUMENTED: ICD-10-PCS | Mod: CPTII,S$GLB,, | Performed by: FAMILY MEDICINE

## 2023-02-01 PROCEDURE — 1160F RVW MEDS BY RX/DR IN RCRD: CPT | Mod: CPTII,S$GLB,, | Performed by: FAMILY MEDICINE

## 2023-02-01 RX ORDER — UBROGEPANT 100 MG/1
TABLET ORAL
COMMUNITY
Start: 2022-09-01

## 2023-02-01 RX ORDER — TRIAZOLAM 0.25 MG/1
0.5 TABLET ORAL NIGHTLY PRN
COMMUNITY
Start: 2022-10-18 | End: 2024-02-02

## 2023-02-01 RX ORDER — ONDANSETRON 4 MG/1
TABLET, ORALLY DISINTEGRATING ORAL
COMMUNITY
Start: 2022-10-19

## 2023-02-01 RX ORDER — AMOXICILLIN AND CLAVULANATE POTASSIUM 875; 125 MG/1; MG/1
1 TABLET, FILM COATED ORAL EVERY 12 HOURS
Qty: 14 TABLET | Refills: 0 | Status: SHIPPED | OUTPATIENT
Start: 2023-02-01 | End: 2023-02-08

## 2023-02-01 RX ORDER — MELOXICAM 15 MG/1
15 TABLET ORAL EVERY MORNING
COMMUNITY
Start: 2022-12-22

## 2023-02-01 NOTE — PROGRESS NOTES
"Subjective:       Patient ID: Makenna Candelaria is a 63 y.o. female.    Vitals:  height is 5' 2" (1.575 m) and weight is 90.7 kg (200 lb). Her tympanic temperature is 97.6 °F (36.4 °C). Her blood pressure is 156/95 (abnormal) and her pulse is 80. Her respiration is 18 and oxygen saturation is 97%.     Chief Complaint: URI    Pt states that she is having cough/mucus and congestion for about 2 weeks . Pt states that she is taking otc meds and got better but states the cough is coming. No fever or chills. No cp or SOB. No GI related symptoms, including, N/v/D or constipation. No anosmia or ageusia.      URI   This is a new problem. The current episode started in the past 7 days. The problem has been unchanged. There has been no fever. Associated symptoms include congestion, coughing, headaches, rhinorrhea and sinus pain. Pertinent negatives include no abdominal pain, chest pain, diarrhea, dysuria, ear pain, joint pain, joint swelling, nausea, neck pain, plugged ear sensation, rash, sneezing, sore throat, swollen glands, vomiting or wheezing. She has tried decongestant and antihistamine (mucinex, tylenol, delsym, zyrtec, flonase, claritin) for the symptoms.     Constitution: Negative for activity change, appetite change, chills, sweating, fatigue, fever, unexpected weight change and generalized weakness.   HENT:  Positive for congestion and sinus pain. Negative for ear pain, nosebleeds, foreign body in nose, postnasal drip, sinus pressure, sore throat, trouble swallowing and voice change.    Neck: Negative for neck pain and neck stiffness.   Cardiovascular:  Negative for chest pain, leg swelling, palpitations and sob on exertion.   Respiratory:  Positive for cough and sputum production. Negative for chest tightness, bloody sputum, COPD, shortness of breath, stridor and wheezing.    Gastrointestinal:  Negative for abdominal pain, nausea, vomiting and diarrhea.   Genitourinary:  Negative for dysuria.   Skin:  Negative for " rash.   Allergic/Immunologic: Negative for sneezing.   Neurological:  Positive for headaches.     Objective:      Physical Exam   Constitutional: She is oriented to person, place, and time. She appears well-developed.  Non-toxic appearance. She does not appear ill. No distress.   HENT:   Head: Normocephalic and atraumatic.   Ears:   Right Ear: External ear normal.   Left Ear: External ear normal.   Nose: Nose normal.   Mouth/Throat: Oropharynx is clear and moist.   Eyes: Conjunctivae, EOM and lids are normal. Pupils are equal, round, and reactive to light.   Neck: Trachea normal and phonation normal. Neck supple.   Pulmonary/Chest: Effort normal and breath sounds normal.   Musculoskeletal: Normal range of motion.         General: Normal range of motion.   Neurological: She is alert and oriented to person, place, and time.   Skin: Skin is warm, dry, intact and not diaphoretic.   Psychiatric: Her speech is normal and behavior is normal. Judgment and thought content normal.   Nursing note and vitals reviewed.      Assessment:       1. Acute sinusitis with symptoms greater than 10 days    2. Acute cough          Plan:       Vss, lungs ctab  Offered tessalon, she declined, encouraged mucinex, delsym, flonase and zyrtec, she says none of this helps her  Declined rtw note    Discussed results/diagnosis/plan with patient in clinic. Strict precautions given to patient to monitor for worsening signs and symptoms. Advised to follow up with PCP or specialist.    Explained side effects of medications prescribed with patient and informed him/her to discontinue use if he/she has any side effects and to inform UC or PCP if this occurs. All questions answered. Strict ED verses clinic return precautions stressed and given in depth. Advised if symptoms worsens of fail to improve he/she should go to the Emergency Room. Discharge and follow-up instructions given verbally/printed with the patient who expressed understanding and  willingness to comply with my recommendations. Patient voiced understanding and in agreement with current treatment plan. Patient exits the exam room in no acute distress. Conversant and engaged during discharge discussion, verbalized understanding.      Acute sinusitis with symptoms greater than 10 days  -     amoxicillin-clavulanate 875-125mg (AUGMENTIN) 875-125 mg per tablet; Take 1 tablet by mouth every 12 (twelve) hours. for 7 days  Dispense: 14 tablet; Refill: 0    Acute cough              Additional MDM:     Heart Failure Score:   COPD = No    Patient Instructions   General Discharge Instructions   PLEASE READ YOUR DISCHARGE INSTRUCTIONS ENTIRELY AS IT CONTAINS IMPORTANT INFORMATION.  If you were prescribed a narcotic or controlled medication, do not drive or operate heavy equipment or machinery while taking these medications.  If you were prescribed antibiotics, please take them to completion.  You must understand that you've received an Urgent Care treatment only and that you may be released before all your medical problems are known or treated. You, the patient, will arrange for follow up care as instructed.    OVER THE COUNTER RECOMMENDATIONS/SUGGESTIONS.    Make sure to stay well hydrated.    Use Nasal Saline to mechanically move any post nasal drip from your eustachian tube or from the back of your throat.    Use warm salt water gargles to ease your throat pain. Warm salt water gargles as needed for sore throat- 1/2 tsp salt to 1 cup warm water, gargle as desired.    Use an antihistamine such as Claritin, Zyrtec or Allegra to dry you out.    Use pseudoephedrine (behind the counter) to decongest. Pseudoephedrine 30 mg up to 240 mg /day. It can raise your blood pressure and give you palpitations.    Use mucinex (guaifenesin) to break up mucous up to 2400mg/day to loosen any mucous.    The mucinex DM pill has a cough suppressant that can be sedating. It can be used at night to stop the tickle at the back  of your throat.    You can use Mucinex D (it has guaifenesin and a high dose of pseudoephedrine) in the mornings to help decongest.    Use Afrin in each nare for no longer than 3 days, as it is addictive. It can also dry out your mucous membranes and cause elevated blood pressure. This is especially useful if you are flying.    Use Flonase 1-2 sprays/nostril per day. It is a local acting steroid nasal spray, if you develop a bloody nose, stop using the medication immediately.    Sometimes Nyquil at night is beneficial to help you get some rest, however it is sedating and it does have an antihistamine, and tylenol.    Honey is a natural cough suppressant that can be used.    Tylenol up to 4,000 mg a day is safe for short periods and can be used for body aches, pain, and fever. However in high doses and prolonged use it can cause liver irritation.    Ibuprofen is a non-steroidal anti-inflammatory that can be used for body aches, pain, and fever.However it can also cause stomach irritation if over used.     Follow up with your PCP or specialty clinic as instructed in the next 2-3 days if not improved or as needed. You can call [phone] to schedule an appointment with appropriate provider.      If you condition worsens, we recommend that you receive another evaluation at the emergency room immediately or contact your primary medical clinic's after hours call service to discuss your concerns.      Please return here or go to the Emergency Department for any concerns or worsening condition.   You can also call [phone] to schedule an appointment with the appropriate provider.    Please return here or go to the Emergency Department for any concerns or worsening of condition.    Thank you for choosing [facility]!    Our goal in the Urgent Care is to always provide outstanding medical care. You may receive a survey by mail or e-mail in the next week regarding your experience today. We would greatly  "appreciate you completing and returning the survey. Your feedback provides us with a way to recognize our staff who provide very good care, and it helps us learn how to improve when your experience was below our aspiration of excellence.      We appreciate you trusting us with your medical care. We hope you feel better soon. We will be happy to take care of you for all of your future medical needs.    Sincerely,    DEBRA Kline  Cough   If your condition worsens or fails to improve we recommend that you receive another evaluation at the ER immediately or contact your PCP to discuss your concerns or return here. You must understand that you've received an urgent care treatment only and that you may be released before all your medical problems are known or treated. You the patient will arrange for follouwp care as instructed. .  Rest and fluids are important  Can use honey with james to soothe your throat  Take prescription cough meds (pills) as prescribed; take prescription cough syrup at night as needed for cough.  Do not take both the prescribed cough pills and syrup at the same time or within 6 hours of each other.  Do not take the cough syrup with any other sedative medication as it can can cause drowsiness. Do not operate any heavy machinery, drink or drive while taking the cough syrup.   -  Flonase (fluticasone) is a nasal spray which is available over the counter and may help with your symptoms.   -  If you have hypertension avoid using the "D" which is the decongestant.  Instead you can use Coricidin HBP for cold and cough symptoms.    -  If you just have drainage you can take plain Zyrtec, Claritin or Allegra   -  Tylenol or ibuprofen can also be used as directed for pain unless you have an allergy to them or medical condition such as stomach ulcers, kidney or liver disease or blood thinners etc for which you should not be taking these type of medications.   Please follow up with your primary care " doctor or specialist in the next 48-72hrs as needed and if no improvement  If you  smoke, please stop smoking.  Follow up with your Primary Care Provider in 2-3 days if no improvement.  If you do not have one, please see the list provided and become established with one.  If your condition worsens we recommend that you receive another evaluation at the emergency room immediately or contact your primary medical clinics after hours call service to discuss your concerns.  Flonase nasal spray as directed. Breathe right strips at night to help you breathe.  A cool mist humidifier in bedroom may help with cough and relieve stuffy nose.  Sore throat:  Lozenge, hard candy or honey.  Sinus rinses DO NOT USE TAP WATER, if you must, water must be a rolling boil for 1 minute, let it cool, then use.  May use distilled water.  Vics vapor rub in shower to help open nasal passages.  May use nasal gel to keep passages moisturized.  May use Nasal saline sprays during the day for added relief of congestion.   For those who go to the gym, please do not use the sauna or steam room now to clear sinuses.  During pollen season, change shirt if you are outside for a while when you go in.  Also wash your face.  Do not touch your face with your hands.  Wash your hands often in general while ill, avoid face contact with hands. Good nutrition. Lots of rest You may or may not have received a steroid injection, if you have, you may experience some jitters or develop nervousness.  You may also not rest well this night- these symptoms will resolve.  If you were give a prescription for steroids, do not medications such as Motrin, Advil, Ibuprofen, Aleve, Mobic, or Toradol while taking the steroid. these are non-steroidal anti-inflammatory medications which you do not need while taking steroids.  If you were given an antibiotic to take at home, please take it until it is completed even if you begin feeling better prior to the course of therapy being  completed.  To attempt to minimize abdominal discomfort while taking antibiotics, you may try to take probiotics.  SEPARATE the antibiotics and probiotics by TWO hours, if not neither medication will work.  If you received a steroid shot today - this can elevate your blood pressure, elevate your blood sugar, water weight gain, nervous energy, redness to the face and dimpling of the skin where the shot goes in.     You must understand that you've received an Urgent Care treatment only and that you may be released before all your medical problems are known or treated. You, the patient, will arrange for follow up care as instructed

## 2023-02-01 NOTE — PATIENT INSTRUCTIONS
General Discharge Instructions   PLEASE READ YOUR DISCHARGE INSTRUCTIONS ENTIRELY AS IT CONTAINS IMPORTANT INFORMATION.  If you were prescribed a narcotic or controlled medication, do not drive or operate heavy equipment or machinery while taking these medications.  If you were prescribed antibiotics, please take them to completion.  You must understand that you've received an Urgent Care treatment only and that you may be released before all your medical problems are known or treated. You, the patient, will arrange for follow up care as instructed.    OVER THE COUNTER RECOMMENDATIONS/SUGGESTIONS.    Make sure to stay well hydrated.    Use Nasal Saline to mechanically move any post nasal drip from your eustachian tube or from the back of your throat.    Use warm salt water gargles to ease your throat pain. Warm salt water gargles as needed for sore throat- 1/2 tsp salt to 1 cup warm water, gargle as desired.    Use an antihistamine such as Claritin, Zyrtec or Allegra to dry you out.    Use pseudoephedrine (behind the counter) to decongest. Pseudoephedrine 30 mg up to 240 mg /day. It can raise your blood pressure and give you palpitations.    Use mucinex (guaifenesin) to break up mucous up to 2400mg/day to loosen any mucous.    The mucinex DM pill has a cough suppressant that can be sedating. It can be used at night to stop the tickle at the back of your throat.    You can use Mucinex D (it has guaifenesin and a high dose of pseudoephedrine) in the mornings to help decongest.    Use Afrin in each nare for no longer than 3 days, as it is addictive. It can also dry out your mucous membranes and cause elevated blood pressure. This is especially useful if you are flying.    Use Flonase 1-2 sprays/nostril per day. It is a local acting steroid nasal spray, if you develop a bloody nose, stop using the medication immediately.    Sometimes Nyquil at night is beneficial to help you get some rest, however it is sedating and it  does have an antihistamine, and tylenol.    Honey is a natural cough suppressant that can be used.    Tylenol up to 4,000 mg a day is safe for short periods and can be used for body aches, pain, and fever. However in high doses and prolonged use it can cause liver irritation.    Ibuprofen is a non-steroidal anti-inflammatory that can be used for body aches, pain, and fever.However it can also cause stomach irritation if over used.     Follow up with your PCP or specialty clinic as instructed in the next 2-3 days if not improved or as needed. You can call [phone] to schedule an appointment with appropriate provider.      If you condition worsens, we recommend that you receive another evaluation at the emergency room immediately or contact your primary medical clinic's after hours call service to discuss your concerns.      Please return here or go to the Emergency Department for any concerns or worsening condition.   You can also call [phone] to schedule an appointment with the appropriate provider.    Please return here or go to the Emergency Department for any concerns or worsening of condition.    Thank you for choosing [facility]!    Our goal in the Urgent Care is to always provide outstanding medical care. You may receive a survey by mail or e-mail in the next week regarding your experience today. We would greatly appreciate you completing and returning the survey. Your feedback provides us with a way to recognize our staff who provide very good care, and it helps us learn how to improve when your experience was below our aspiration of excellence.      We appreciate you trusting us with your medical care. We hope you feel better soon. We will be happy to take care of you for all of your future medical needs.    Sincerely,    DEBRA Kline  Cough   If your condition worsens or fails to improve we recommend that you receive another evaluation at the ER immediately or contact your PCP  "to discuss your concerns or return here. You must understand that you've received an urgent care treatment only and that you may be released before all your medical problems are known or treated. You the patient will arrange for follwp care as instructed. .  Rest and fluids are important  Can use honey with james to soothe your throat  Take prescription cough meds (pills) as prescribed; take prescription cough syrup at night as needed for cough.  Do not take both the prescribed cough pills and syrup at the same time or within 6 hours of each other.  Do not take the cough syrup with any other sedative medication as it can can cause drowsiness. Do not operate any heavy machinery, drink or drive while taking the cough syrup.   -  Flonase (fluticasone) is a nasal spray which is available over the counter and may help with your symptoms.   -  If you have hypertension avoid using the "D" which is the decongestant.  Instead you can use Coricidin HBP for cold and cough symptoms.    -  If you just have drainage you can take plain Zyrtec, Claritin or Allegra   -  Tylenol or ibuprofen can also be used as directed for pain unless you have an allergy to them or medical condition such as stomach ulcers, kidney or liver disease or blood thinners etc for which you should not be taking these type of medications.   Please follow up with your primary care doctor or specialist in the next 48-72hrs as needed and if no improvement  If you  smoke, please stop smoking.  Follow up with your Primary Care Provider in 2-3 days if no improvement.  If you do not have one, please see the list provided and become established with one.  If your condition worsens we recommend that you receive another evaluation at the emergency room immediately or contact your primary medical clinics after hours call service to discuss your concerns.  Flonase nasal spray as directed. Breathe right strips at night to help you breathe.  A cool mist humidifier in " bedroom may help with cough and relieve stuffy nose.  Sore throat:  Lozenge, hard candy or honey.  Sinus rinses DO NOT USE TAP WATER, if you must, water must be a rolling boil for 1 minute, let it cool, then use.  May use distilled water.  Vics vapor rub in shower to help open nasal passages.  May use nasal gel to keep passages moisturized.  May use Nasal saline sprays during the day for added relief of congestion.   For those who go to the gym, please do not use the sauna or steam room now to clear sinuses.  During pollen season, change shirt if you are outside for a while when you go in.  Also wash your face.  Do not touch your face with your hands.  Wash your hands often in general while ill, avoid face contact with hands. Good nutrition. Lots of rest You may or may not have received a steroid injection, if you have, you may experience some jitters or develop nervousness.  You may also not rest well this night- these symptoms will resolve.  If you were give a prescription for steroids, do not medications such as Motrin, Advil, Ibuprofen, Aleve, Mobic, or Toradol while taking the steroid. these are non-steroidal anti-inflammatory medications which you do not need while taking steroids.  If you were given an antibiotic to take at home, please take it until it is completed even if you begin feeling better prior to the course of therapy being completed.  To attempt to minimize abdominal discomfort while taking antibiotics, you may try to take probiotics.  SEPARATE the antibiotics and probiotics by TWO hours, if not neither medication will work.  If you received a steroid shot today - this can elevate your blood pressure, elevate your blood sugar, water weight gain, nervous energy, redness to the face and dimpling of the skin where the shot goes in.     You must understand that you've received an Urgent Care treatment only and that you may be released before all your medical problems are known or treated. You, the  patient, will arrange for follow up care as instructed

## 2023-02-13 ENCOUNTER — OFFICE VISIT (OUTPATIENT)
Dept: FAMILY MEDICINE | Facility: CLINIC | Age: 63
End: 2023-02-13
Payer: COMMERCIAL

## 2023-02-13 VITALS
DIASTOLIC BLOOD PRESSURE: 80 MMHG | WEIGHT: 204.06 LBS | SYSTOLIC BLOOD PRESSURE: 140 MMHG | HEIGHT: 62 IN | OXYGEN SATURATION: 97 % | HEART RATE: 106 BPM | TEMPERATURE: 98 F | BODY MASS INDEX: 37.55 KG/M2

## 2023-02-13 DIAGNOSIS — R05.9 COUGH, UNSPECIFIED TYPE: Primary | ICD-10-CM

## 2023-02-13 DIAGNOSIS — K21.9 GASTROESOPHAGEAL REFLUX DISEASE, UNSPECIFIED WHETHER ESOPHAGITIS PRESENT: ICD-10-CM

## 2023-02-13 PROCEDURE — 3008F BODY MASS INDEX DOCD: CPT | Mod: CPTII,S$GLB,, | Performed by: FAMILY MEDICINE

## 2023-02-13 PROCEDURE — 3079F DIAST BP 80-89 MM HG: CPT | Mod: CPTII,S$GLB,, | Performed by: FAMILY MEDICINE

## 2023-02-13 PROCEDURE — 99214 OFFICE O/P EST MOD 30 MIN: CPT | Mod: S$GLB,,, | Performed by: FAMILY MEDICINE

## 2023-02-13 PROCEDURE — 3079F PR MOST RECENT DIASTOLIC BLOOD PRESSURE 80-89 MM HG: ICD-10-PCS | Mod: CPTII,S$GLB,, | Performed by: FAMILY MEDICINE

## 2023-02-13 PROCEDURE — 3077F PR MOST RECENT SYSTOLIC BLOOD PRESSURE >= 140 MM HG: ICD-10-PCS | Mod: CPTII,S$GLB,, | Performed by: FAMILY MEDICINE

## 2023-02-13 PROCEDURE — 99999 PR PBB SHADOW E&M-EST. PATIENT-LVL IV: CPT | Mod: PBBFAC,,, | Performed by: FAMILY MEDICINE

## 2023-02-13 PROCEDURE — 3008F PR BODY MASS INDEX (BMI) DOCUMENTED: ICD-10-PCS | Mod: CPTII,S$GLB,, | Performed by: FAMILY MEDICINE

## 2023-02-13 PROCEDURE — 1159F MED LIST DOCD IN RCRD: CPT | Mod: CPTII,S$GLB,, | Performed by: FAMILY MEDICINE

## 2023-02-13 PROCEDURE — 1159F PR MEDICATION LIST DOCUMENTED IN MEDICAL RECORD: ICD-10-PCS | Mod: CPTII,S$GLB,, | Performed by: FAMILY MEDICINE

## 2023-02-13 PROCEDURE — 99214 PR OFFICE/OUTPT VISIT, EST, LEVL IV, 30-39 MIN: ICD-10-PCS | Mod: S$GLB,,, | Performed by: FAMILY MEDICINE

## 2023-02-13 PROCEDURE — 3077F SYST BP >= 140 MM HG: CPT | Mod: CPTII,S$GLB,, | Performed by: FAMILY MEDICINE

## 2023-02-13 PROCEDURE — 99999 PR PBB SHADOW E&M-EST. PATIENT-LVL IV: ICD-10-PCS | Mod: PBBFAC,,, | Performed by: FAMILY MEDICINE

## 2023-02-13 RX ORDER — BENZONATATE 100 MG/1
100 CAPSULE ORAL 3 TIMES DAILY PRN
Qty: 30 CAPSULE | Refills: 0 | Status: SHIPPED | OUTPATIENT
Start: 2023-02-13 | End: 2023-03-23 | Stop reason: ALTCHOICE

## 2023-02-13 RX ORDER — PHENOL/SODIUM PHENOLATE
1 AEROSOL, SPRAY (ML) MUCOUS MEMBRANE DAILY PRN
Qty: 30 EACH | Refills: 1 | Status: SHIPPED | OUTPATIENT
Start: 2023-02-13 | End: 2023-05-01

## 2023-02-13 RX ORDER — LORATADINE 10 MG/1
10 TABLET ORAL DAILY PRN
Qty: 30 TABLET | Refills: 0 | Status: SHIPPED | OUTPATIENT
Start: 2023-02-13 | End: 2024-02-14 | Stop reason: SDUPTHER

## 2023-02-13 NOTE — PROGRESS NOTES
[facility]  Progress Note    SUBJECTIVE:     Chief Complaint   Patient presents with    Cough       HPI   Makenna Candelaria  is a 63 y.o. female here for c/o a cough which usually isn't productive. No fevers, chills, SOB. No new medications recently. Has been going on for the past 3-4 weeks, somewhat persistent.     Review of patient's allergies indicates:   Allergen Reactions    Sulfa (sulfonamide antibiotics)        Past Medical History:   Diagnosis Date    Breast cancer     Chronic headaches     Hypertension      Past Surgical History:   Procedure Laterality Date    BREAST BIOPSY      BREAST LUMPECTOMY      BUNIONECTOMY       SECTION       Family History   Problem Relation Age of Onset    Breast cancer Mother     Breast cancer Maternal Aunt     Breast cancer Maternal Aunt     Ovarian cancer Neg Hx     Colon cancer Neg Hx      Social History     Tobacco Use    Smoking status: Never    Smokeless tobacco: Never   Substance Use Topics    Alcohol use: Not Currently    Drug use: Never        Review of Systems   Constitutional:  Negative for chills and fever.   HENT: Negative.  Negative for congestion.    Respiratory:  Positive for cough. Negative for sputum production and shortness of breath.    Cardiovascular: Negative.  Negative for chest pain.   Gastrointestinal:  Positive for heartburn. Negative for abdominal pain, nausea and vomiting.   Genitourinary: Negative.    Neurological:  Negative for headaches.   All other systems reviewed and are negative.  OBJECTIVE:     Vitals:    23 1402   BP: (!) 140/80   Pulse: 106   Temp: 97.6 °F (36.4 °C)     Body mass index is 37.32 kg/m².    Physical Exam  Constitutional:       General: She is not in acute distress.     Appearance: She is not diaphoretic.   HENT:      Head: Normocephalic and atraumatic.      Nose: Nose normal.   Eyes:      Conjunctiva/sclera: Conjunctivae normal.   Cardiovascular:      Rate and Rhythm: Normal rate and regular rhythm.       Heart sounds: Normal heart sounds. No murmur heard.    No friction rub. No gallop.   Pulmonary:      Effort: Pulmonary effort is normal. No respiratory distress.      Breath sounds: Normal breath sounds. No wheezing or rales.   Abdominal:      Palpations: Abdomen is soft.   Skin:     General: Skin is warm.   Neurological:      Mental Status: She is alert and oriented to person, place, and time.       Old records were reviewed. Labs and/or images were independently reviewed.    ASSESSMENT     1. Cough, unspecified type    2. Gastroesophageal reflux disease, unspecified whether esophagitis present        PLAN:     Cough, unspecified type  -     benzonatate (TESSALON) 100 MG capsule; Take 1 capsule (100 mg total) by mouth 3 (three) times daily as needed for Cough.  Dispense: 30 capsule; Refill: 0  -     loratadine (CLARITIN) 10 mg tablet; Take 1 tablet (10 mg total) by mouth daily as needed for Allergies (or runny nose).  Dispense: 30 tablet; Refill: 0  -     suspect sinus issue. Take anti-histamine as discussed, and tessalon PRN cough.    Gastroesophageal reflux disease, unspecified whether esophagitis present  -     Start omeprazole 20 mg TbEC; Take 1 tablet by mouth daily as needed.  Dispense: 30 each; Refill: 1  -      will also treat reflux as possible cause of cough.      RTC PRN    Shahbaz Hagan MD  02/13/2023 2:13 PM

## 2023-02-23 ENCOUNTER — TELEPHONE (OUTPATIENT)
Dept: NEUROLOGY | Facility: CLINIC | Age: 63
End: 2023-02-23
Payer: COMMERCIAL

## 2023-02-23 NOTE — TELEPHONE ENCOUNTER
----- Message from Eilzabeth Archibald sent at 2/23/2023  1:22 PM CST -----  Name of Caller pt   Reason for Visit/Symptoms pt requesting to be seen tomorrow if possible for migraines. Call pt. Nothing available.   Best Contact Number or Confirm if Kenny Preferred [phone] (home)     Preferred Date/Time of Appointment tomorrow   Interested in Virtual Visit (yes/no)  Additional Information      Voicemail picks up then a Marshallese message comes across then the phone hangs up

## 2023-03-22 ENCOUNTER — PATIENT MESSAGE (OUTPATIENT)
Dept: ADMINISTRATIVE | Facility: HOSPITAL | Age: 63
End: 2023-03-22
Payer: COMMERCIAL

## 2023-03-23 ENCOUNTER — OFFICE VISIT (OUTPATIENT)
Dept: FAMILY MEDICINE | Facility: CLINIC | Age: 63
End: 2023-03-23
Payer: COMMERCIAL

## 2023-03-23 ENCOUNTER — PATIENT OUTREACH (OUTPATIENT)
Dept: ADMINISTRATIVE | Facility: HOSPITAL | Age: 63
End: 2023-03-23
Payer: COMMERCIAL

## 2023-03-23 VITALS
HEART RATE: 88 BPM | TEMPERATURE: 98 F | SYSTOLIC BLOOD PRESSURE: 116 MMHG | WEIGHT: 209.19 LBS | BODY MASS INDEX: 38.5 KG/M2 | DIASTOLIC BLOOD PRESSURE: 72 MMHG | HEIGHT: 62 IN | OXYGEN SATURATION: 95 %

## 2023-03-23 DIAGNOSIS — E66.01 CLASS 2 SEVERE OBESITY DUE TO EXCESS CALORIES WITH SERIOUS COMORBIDITY AND BODY MASS INDEX (BMI) OF 38.0 TO 38.9 IN ADULT: ICD-10-CM

## 2023-03-23 DIAGNOSIS — Z00.00 GENERAL MEDICAL EXAM: ICD-10-CM

## 2023-03-23 DIAGNOSIS — Z12.31 VISIT FOR SCREENING MAMMOGRAM: ICD-10-CM

## 2023-03-23 DIAGNOSIS — I10 ESSENTIAL HYPERTENSION: Primary | ICD-10-CM

## 2023-03-23 PROCEDURE — 99214 PR OFFICE/OUTPT VISIT, EST, LEVL IV, 30-39 MIN: ICD-10-PCS | Mod: S$GLB,,, | Performed by: FAMILY MEDICINE

## 2023-03-23 PROCEDURE — 99214 OFFICE O/P EST MOD 30 MIN: CPT | Mod: S$GLB,,, | Performed by: FAMILY MEDICINE

## 2023-03-23 PROCEDURE — 4010F PR ACE/ARB THEARPY RXD/TAKEN: ICD-10-PCS | Mod: CPTII,S$GLB,, | Performed by: FAMILY MEDICINE

## 2023-03-23 PROCEDURE — 1160F RVW MEDS BY RX/DR IN RCRD: CPT | Mod: CPTII,S$GLB,, | Performed by: FAMILY MEDICINE

## 2023-03-23 PROCEDURE — 99999 PR PBB SHADOW E&M-EST. PATIENT-LVL V: CPT | Mod: PBBFAC,,, | Performed by: FAMILY MEDICINE

## 2023-03-23 PROCEDURE — 3074F PR MOST RECENT SYSTOLIC BLOOD PRESSURE < 130 MM HG: ICD-10-PCS | Mod: CPTII,S$GLB,, | Performed by: FAMILY MEDICINE

## 2023-03-23 PROCEDURE — 3078F PR MOST RECENT DIASTOLIC BLOOD PRESSURE < 80 MM HG: ICD-10-PCS | Mod: CPTII,S$GLB,, | Performed by: FAMILY MEDICINE

## 2023-03-23 PROCEDURE — 1159F PR MEDICATION LIST DOCUMENTED IN MEDICAL RECORD: ICD-10-PCS | Mod: CPTII,S$GLB,, | Performed by: FAMILY MEDICINE

## 2023-03-23 PROCEDURE — 3008F BODY MASS INDEX DOCD: CPT | Mod: CPTII,S$GLB,, | Performed by: FAMILY MEDICINE

## 2023-03-23 PROCEDURE — 3008F PR BODY MASS INDEX (BMI) DOCUMENTED: ICD-10-PCS | Mod: CPTII,S$GLB,, | Performed by: FAMILY MEDICINE

## 2023-03-23 PROCEDURE — 1160F PR REVIEW ALL MEDS BY PRESCRIBER/CLIN PHARMACIST DOCUMENTED: ICD-10-PCS | Mod: CPTII,S$GLB,, | Performed by: FAMILY MEDICINE

## 2023-03-23 PROCEDURE — 3078F DIAST BP <80 MM HG: CPT | Mod: CPTII,S$GLB,, | Performed by: FAMILY MEDICINE

## 2023-03-23 PROCEDURE — 1159F MED LIST DOCD IN RCRD: CPT | Mod: CPTII,S$GLB,, | Performed by: FAMILY MEDICINE

## 2023-03-23 PROCEDURE — 3074F SYST BP LT 130 MM HG: CPT | Mod: CPTII,S$GLB,, | Performed by: FAMILY MEDICINE

## 2023-03-23 PROCEDURE — 99999 PR PBB SHADOW E&M-EST. PATIENT-LVL V: ICD-10-PCS | Mod: PBBFAC,,, | Performed by: FAMILY MEDICINE

## 2023-03-23 PROCEDURE — 4010F ACE/ARB THERAPY RXD/TAKEN: CPT | Mod: CPTII,S$GLB,, | Performed by: FAMILY MEDICINE

## 2023-03-23 RX ORDER — SEMAGLUTIDE 2.4 MG/.75ML
INJECTION, SOLUTION SUBCUTANEOUS
Qty: 9 ML | Refills: 1 | Status: SHIPPED | OUTPATIENT
Start: 2023-03-23 | End: 2023-06-20

## 2023-03-23 RX ORDER — PHENTERMINE HYDROCHLORIDE 37.5 MG/1
37.5 TABLET ORAL
Qty: 30 TABLET | Refills: 2 | Status: SHIPPED | OUTPATIENT
Start: 2023-03-23 | End: 2023-04-22

## 2023-03-23 NOTE — PROGRESS NOTES
"Routine Office Visit    Makenna Candelaria  1960  [id number]      Subjective     Makenna is a 63 y.o. female who presents today for:    Weight - Patient was placed on wegovy by another provider. She has been on it for a few months. She started at 225# and is now down 20 pounds. Patient started wegovy in February 2022. Patient has been finding it more difficult to lose weight. She states she loves easter candy and has been eating more of it.      Diets tried - weight watchers, sensible meals; Mediterranean diet.   Medications tried - adipex - Patient was not able to sleep with medication. She felt side effects of anxiety.      6/30/2022 - 205 8/5/2022 - 200  3/23/2023 - 209     Weight goal - 180 (changed 3/23/2023)      Sleep - 7-8 hours      Exercise - none; working on house; so Patient is active.      Work - , collecting taxes. Patient drives and walks      Objective     Review of Systems   Constitutional:  Negative for chills and fever.   HENT:  Negative for congestion.    Eyes:  Negative for blurred vision.   Respiratory:  Negative for cough.    Cardiovascular:  Negative for chest pain.   Gastrointestinal:  Negative for abdominal pain, constipation, diarrhea, heartburn, nausea and vomiting.   Genitourinary:  Negative for dysuria.   Musculoskeletal:  Negative for myalgias.   Skin:  Negative for itching and rash.   Neurological:  Negative for dizziness and headaches.   Psychiatric/Behavioral:  Negative for depression.      /72   Pulse 88   Temp 98.1 °F (36.7 °C) (Oral)   Ht 5' 2" (1.575 m)   Wt 94.9 kg (209 lb 3.5 oz)   SpO2 95%   BMI 38.27 kg/m²   Physical Exam  Constitutional:       Appearance: She is well-developed.   HENT:      Head: Normocephalic and atraumatic.   Eyes:      Conjunctiva/sclera: Conjunctivae normal.      Pupils: Pupils are equal, round, and reactive to light.   Cardiovascular:      Rate and Rhythm: Normal rate and regular rhythm.      Heart sounds: Normal heart sounds. No " murmur heard.    No friction rub. No gallop.   Pulmonary:      Effort: No respiratory distress.      Breath sounds: Normal breath sounds.   Abdominal:      General: Bowel sounds are normal. There is no distension.      Palpations: Abdomen is soft.      Tenderness: There is no abdominal tenderness.   Musculoskeletal:         General: Normal range of motion.      Cervical back: Normal range of motion and neck supple.   Lymphadenopathy:      Cervical: No cervical adenopathy.   Skin:     General: Skin is warm.   Neurological:      Mental Status: She is alert and oriented to person, place, and time.           Assessment     Health Maintenance         Date Due Completion Date    Hepatitis C Screening Never done ---    COVID-19 Vaccine (1) Never done ---    HIV Screening Never done ---    Colorectal Cancer Screening Never done ---    Cervical Cancer Screening 11/19/2016 11/19/2015    Influenza Vaccine (1) Never done ---    Mammogram 12/30/2022 12/30/2021    Hemoglobin A1c (Diabetic Prevention Screening) 06/20/2025 6/20/2022    Lipid Panel 06/20/2027 6/20/2022    TETANUS VACCINE 09/23/2031 9/23/2021              Problem List Items Addressed This Visit          Cardiac/Vascular    Essential hypertension - Primary  The current medical regimen is effective;  continue present plan and medications.       Other Visit Diagnoses       Visit for screening mammogram        Relevant Orders    Mammo Digital Screening Bilat w/ Ryne    General medical exam        Relevant Orders    CBC Auto Differential    Comprehensive Metabolic Panel    Lipid Panel    Hemoglobin A1C    HIV 1/2 Ag/Ab (4th Gen)    Hepatitis C Antibody    Ambulatory referral/consult to Obstetrics / Gynecology    Class 2 severe obesity due to excess calories with serious comorbidity and body mass index (BMI) of 38.0 to 38.9 in adult        Relevant Medications    semaglutide, weight loss, (WEGOVY) 2.4 mg/0.75 mL PnIj    phentermine (ADIPEX-P) 37.5 mg tablet  The patient is  asked to make an attempt to improve diet and exercise patterns to aid in medical management of this problem.   Common side effects of this medication were discussed with the patient. Questions regarding medications were discussed during this visit.    Advise to decrease process carbohydrates  Discussed realistic expectations and goals of weight loss / medications   Change goal weight loss   Will trial phentermine x 3 months                     Follow up in about 3 months (around 6/23/2023), or if symptoms worsen or fail to improve, for yearly exam.

## 2023-03-29 ENCOUNTER — PATIENT OUTREACH (OUTPATIENT)
Dept: ADMINISTRATIVE | Facility: HOSPITAL | Age: 63
End: 2023-03-29
Payer: COMMERCIAL

## 2023-05-26 DIAGNOSIS — I10 ESSENTIAL HYPERTENSION: ICD-10-CM

## 2023-05-26 RX ORDER — LOSARTAN POTASSIUM 100 MG/1
TABLET ORAL
Qty: 90 TABLET | Refills: 0 | Status: SHIPPED | OUTPATIENT
Start: 2023-05-26 | End: 2023-08-28 | Stop reason: SDUPTHER

## 2023-05-26 NOTE — TELEPHONE ENCOUNTER
Care Due:                  Date            Visit Type   Department     Provider  --------------------------------------------------------------------------------                                [facility]                              PRIMARY      MED/ INTERNAL  Last Visit: 03-      CARE ([facility])   MED/ PEDS      Elizabeth Byrnes                              [facility]                              PRIMARY      MED/ INTERNAL  Next Visit: 08-      CARE ([facility])   MED/ PEDS      Elizabeth Byrnes                                                            Last  Test          Frequency    Reason                     Performed    Due Date  --------------------------------------------------------------------------------    CMP.........  12 months..  hydroCHLOROthiazide,       06-   06-                             losartan.................    [employer] Embedded Care Due Messages. Reference number: [id number].   5/26/2023 6:00:00 AM FAVIO

## 2023-05-26 NOTE — TELEPHONE ENCOUNTER
Refill Routing Note   Medication(s) are not appropriate for processing by [facility] for the following reason(s):      Required labs abnormal  06/20/2022 3.1 (L) 3.5 - 5.1 mmol/L Final     [facility] action(s):  Defer  Approve Care Due:  None identified   Medication Therapy Plan: FLOS      Appointments  past 12m or future 3m with PCP    Date Provider   Last Visit   6/16/2022 Kandi Vu MD   Next Visit   Visit date not found Kandi Vu MD   ED visits in past 90 days: 0        Note composed:4:16 PM 05/26/2023

## 2023-05-29 RX ORDER — HYDROCHLOROTHIAZIDE 25 MG/1
TABLET ORAL
Qty: 90 TABLET | Refills: 0 | Status: SHIPPED | OUTPATIENT
Start: 2023-05-29 | End: 2023-08-25

## 2023-08-24 DIAGNOSIS — I10 ESSENTIAL HYPERTENSION: ICD-10-CM

## 2023-08-24 NOTE — TELEPHONE ENCOUNTER
Refill Routing Note   Medication(s) are not appropriate for processing by [facility] for the following reason(s):      Required labs outdated    [facility] action(s):  Defer Care Due:  Labs due            Appointments  past 12m or future 3m with PCP    Date Provider   Last Visit   3/23/2023 Elizabeth Byrnes MD   Next Visit   Visit date not found Elizabeth Byrnes MD   ED visits in past 90 days: 0        Note composed:2:06 PM 08/24/2023

## 2023-08-24 NOTE — TELEPHONE ENCOUNTER
Care Due:                  Date            Visit Type   Department     Provider  --------------------------------------------------------------------------------                                [facility]                              PRIMARY      MED/ INTERNAL  Last Visit: 03-      CARE ([facility])   MED/ PEDS      Elizabeth Byrnes  Next Visit: None Scheduled  None         None Found                                                            Last  Test          Frequency    Reason                     Performed    Due Date  --------------------------------------------------------------------------------    CMP.........  12 months..  hydroCHLOROthiazide,       06-   06-                             losartan.................    HBA1C.......  6 months...  semaglutide,.............  06- 12-    [facility] Embedded Care Due Messages. Reference number: [id number].   8/24/2023 5:55:21 AM CDT

## 2023-08-25 RX ORDER — HYDROCHLOROTHIAZIDE 25 MG/1
TABLET ORAL
Qty: 90 TABLET | Refills: 0 | Status: SHIPPED | OUTPATIENT
Start: 2023-08-25 | End: 2023-11-20

## 2023-08-28 DIAGNOSIS — I10 ESSENTIAL HYPERTENSION: ICD-10-CM

## 2023-08-28 RX ORDER — LOSARTAN POTASSIUM 100 MG/1
100 TABLET ORAL DAILY
Qty: 30 TABLET | Refills: 0 | Status: SHIPPED | OUTPATIENT
Start: 2023-08-28 | End: 2023-09-28

## 2023-08-28 NOTE — TELEPHONE ENCOUNTER
No care due was identified.  [facility] Embedded Care Due Messages. Reference number: [id number].   8/28/2023 11:32:22 AM CDT

## 2023-08-29 DIAGNOSIS — I10 ESSENTIAL HYPERTENSION: ICD-10-CM

## 2023-08-29 NOTE — TELEPHONE ENCOUNTER
No care due was identified.  [facility] Embedded Care Due Messages. Reference number: [id number].   8/29/2023 4:08:16 PM CDT

## 2023-09-01 RX ORDER — LOSARTAN POTASSIUM 100 MG/1
100 TABLET ORAL DAILY
Qty: 30 TABLET | Refills: 0 | OUTPATIENT
Start: 2023-09-01

## 2023-10-03 DIAGNOSIS — I10 ESSENTIAL HYPERTENSION: ICD-10-CM

## 2023-10-03 NOTE — TELEPHONE ENCOUNTER
No care due was identified.  [facility] Embedded Care Due Messages. Reference number: [id number].   10/03/2023 11:40:29 AM CDT

## 2023-10-05 RX ORDER — LOSARTAN POTASSIUM 100 MG/1
TABLET ORAL
Qty: 90 TABLET | Refills: 1 | OUTPATIENT
Start: 2023-10-05

## 2023-10-05 NOTE — TELEPHONE ENCOUNTER
Please look at the last time the medication was prescribed. This medication was prescribed one week ago by Dr. Byrnes

## 2023-10-17 ENCOUNTER — CLINICAL SUPPORT (OUTPATIENT)
Dept: OTHER | Facility: CLINIC | Age: 63
End: 2023-10-17
Payer: COMMERCIAL

## 2023-10-17 DIAGNOSIS — Z00.8 ENCOUNTER FOR OTHER GENERAL EXAMINATION: ICD-10-CM

## 2023-10-18 VITALS
HEIGHT: 62 IN | DIASTOLIC BLOOD PRESSURE: 90 MMHG | SYSTOLIC BLOOD PRESSURE: 153 MMHG | WEIGHT: 208 LBS | BODY MASS INDEX: 38.28 KG/M2

## 2023-10-18 LAB
GLUCOSE SERPL-MCNC: 87 MG/DL (ref 60–140)
HDLC SERPL-MCNC: 40 MG/DL
POC CHOLESTEROL, LDL (DOCK): 106 MG/DL
POC CHOLESTEROL, TOTAL: 175 MG/DL
TRIGL SERPL-MCNC: 167 MG/DL

## 2023-11-20 VITALS — SYSTOLIC BLOOD PRESSURE: 134 MMHG | DIASTOLIC BLOOD PRESSURE: 82 MMHG

## 2023-11-20 DIAGNOSIS — I10 ESSENTIAL HYPERTENSION: ICD-10-CM

## 2023-11-20 RX ORDER — HYDROCHLOROTHIAZIDE 25 MG/1
TABLET ORAL
Qty: 90 TABLET | Refills: 0 | Status: SHIPPED | OUTPATIENT
Start: 2023-11-20 | End: 2024-02-23

## 2023-11-20 NOTE — TELEPHONE ENCOUNTER
Please call patient for updated BP reading so I can refill her medication. (Last BP recorded in chart was greater than goal 139/89 or less).  Please document BP in remote BP reading section.  Please schedule nurse BP check in 2 weeks if not controlled.  Please send message back to me for refill of medication once this is completed.

## 2023-11-20 NOTE — TELEPHONE ENCOUNTER
Care Due:                  Date            Visit Type   Department     Provider  --------------------------------------------------------------------------------                                [facility]                              PRIMARY      MED/ INTERNAL  Last Visit: 03-      CARE ([facility])   MED/ PEDS      Elizabeth Byrnes  Next Visit: None Scheduled  None         None Found                                                            Last  Test          Frequency    Reason                     Performed    Due Date  --------------------------------------------------------------------------------    CMP.........  12 months..  hydroCHLOROthiazide,       06-   06-                             losartan.................    HBA1C.......  6 months...  semaglutide,.............  06- 12-    [employer] Embedded Care Due Messages. Reference number: [id number].   11/20/2023 3:26:15 AM CST

## 2023-11-20 NOTE — TELEPHONE ENCOUNTER
Airway       Patient location during procedure: OR       Procedure Start/Stop Times: 11/11/2022 12:50 PM  Staff -        Anesthesiologist:  Abisai Wren MD       Resident/Fellow: Soo Altamirano MD       Performed By: residentIndications and Patient Condition       Indications for airway management: ariana-procedural       Induction type:intravenous       Mask difficulty assessment: 1 - vent by mask    Final Airway Details       Final airway type: endotracheal airway       Successful airway: ETT - single  Endotracheal Airway Details        ETT size (mm): 7.0       Cuffed: yes       Successful intubation technique: direct laryngoscopy       DL Blade Type: MAC 3       Grade View of Cords: 1       Adjucts: stylet       Position: Right       Measured from: gums/teeth       Secured at (cm): 21       Bite block used: None    Post intubation assessment        Placement verified by: capnometry and chest rise        Number of attempts at approach: 1       Number of other approaches attempted: 0       Secured with: pink tape       Ease of procedure: easy       Dentition: Unchanged    Medication(s) Administered   Medication Administration Time: 11/11/2022 12:50 PM       Refill Routing Note   Medication(s) are not appropriate for processing by [facility] for the following reason(s):        Required vitals abnormal  Required labs outdated    [facility] action(s):  Defer     Requires labs : Yes             Appointments  past 12m or future 3m with PCP    Date Provider   Last Visit   3/23/2023 Elizabeth Byrnes MD   Next Visit   Visit date not found Elizabeth Byrnes MD   ED visits in past 90 days: 0        Note composed:10:14 AM 11/20/2023

## 2023-12-12 DIAGNOSIS — E66.01 CLASS 2 SEVERE OBESITY DUE TO EXCESS CALORIES WITH SERIOUS COMORBIDITY AND BODY MASS INDEX (BMI) OF 38.0 TO 38.9 IN ADULT: ICD-10-CM

## 2023-12-12 NOTE — TELEPHONE ENCOUNTER
----- Message from Gil Omkar sent at 12/12/2023  3:19 PM CST -----  Regarding: Self  [phone]  Type: RX Refill Request    Who Called:  Self     Have you contacted your pharmacy: no     Refill    RX Name and Strength:semaglutide, weight loss, (WEGOVY) 2.4 mg/0.75 mL PnIj    Preferred Pharmacy with phone number:   [id number] - [address]  [facility] [address] [address] [facility] [address]  [facility]  [place] [address]  Phone: [phone] Fax: [phone]    Local or Mail Order: local     Would the patient rather a call back or a response via [facility]? Call back     Best Call Back Number: [phone]     Additional Information:  would like a call back about this and also about getting an appt with someone else if able to.     Thank you.

## 2023-12-12 NOTE — TELEPHONE ENCOUNTER
No care due was identified.  [facility] Embedded Care Due Messages. Reference number: [id number].   12/12/2023 3:29:41 PM CST

## 2023-12-13 RX ORDER — SEMAGLUTIDE 2.4 MG/.75ML
INJECTION, SOLUTION SUBCUTANEOUS
Qty: 9 ML | Refills: 1 | Status: SHIPPED | OUTPATIENT
Start: 2023-12-13 | End: 2023-12-19 | Stop reason: SDUPTHER

## 2023-12-15 RX ORDER — OMEPRAZOLE 20 MG/1
CAPSULE, DELAYED RELEASE ORAL
Qty: 90 CAPSULE | Refills: 1 | Status: SHIPPED | OUTPATIENT
Start: 2023-12-15

## 2023-12-15 NOTE — TELEPHONE ENCOUNTER
No care due was identified.  [facility] Embedded Care Due Messages. Reference number: [id number].   12/15/2023 3:26:16 AM CST

## 2023-12-15 NOTE — TELEPHONE ENCOUNTER
Refill Routing Note   Medication(s) are not appropriate for processing by [facility] for the following reason(s):        Outside of protocol    [facility] action(s):  Route        Medication Therapy Plan: Omeprazole as needed is outside of [facility] protocol      Appointments  past 12m or future 3m with PCP    Date Provider   Last Visit   3/23/2023 Elizabeth Byrnes MD   Next Visit   Visit date not found Elizabeth Byrnes MD   ED visits in past 90 days: 0        Note composed:12:39 PM 12/15/2023

## 2023-12-19 DIAGNOSIS — E66.01 CLASS 2 SEVERE OBESITY DUE TO EXCESS CALORIES WITH SERIOUS COMORBIDITY AND BODY MASS INDEX (BMI) OF 38.0 TO 38.9 IN ADULT: ICD-10-CM

## 2023-12-19 NOTE — TELEPHONE ENCOUNTER
----- Message from Lalitha Holder sent at 12/19/2023  4:02 PM CST -----  Regarding: self [phone]  Type: Patient Call Back    Who called: self     What is the request in detail: Judith Is waiting on the nurse for semaglutide, weight loss, (WEGOVY) 2.4 mg/0.75 mL PnIj , they need to know the MG and how to dispense it. Per the patient they stated they sent a request and have not heard anything.      [facility] [address] [place] [address] [phone]     Can the clinic reply by [facility]? no    Would the patient rather a call back or a response via [facility]? Call back     Best call back number: [phone]

## 2023-12-19 NOTE — TELEPHONE ENCOUNTER
No care due was identified.  [facility] Embedded Care Due Messages. Reference number: [id number].   12/19/2023 4:14:24 PM CST

## 2023-12-20 RX ORDER — SEMAGLUTIDE 2.4 MG/.75ML
INJECTION, SOLUTION SUBCUTANEOUS
Qty: 9 ML | Refills: 1 | Status: SHIPPED | OUTPATIENT
Start: 2023-12-20 | End: 2024-02-16

## 2024-01-14 DIAGNOSIS — I10 ESSENTIAL HYPERTENSION: ICD-10-CM

## 2024-01-14 NOTE — TELEPHONE ENCOUNTER
No care due was identified.  [facility] Embedded Care Due Messages. Reference number: [id number].   1/14/2024 8:04:09 AM CST

## 2024-01-16 NOTE — TELEPHONE ENCOUNTER
Refill Routing Note   Medication(s) are not appropriate for processing by [facility] for the following reason(s):        Required labs outdated    [facility] action(s):  Defer               Appointments  past 12m or future 3m with PCP    Date Provider   Last Visit   3/23/2023 Elizabeth Byrnes MD   Next Visit   Visit date not found Elizabeth Byrnes MD   ED visits in past 90 days: 0        Note composed:8:16 AM 01/16/2024

## 2024-01-18 RX ORDER — LOSARTAN POTASSIUM 100 MG/1
100 TABLET ORAL
Qty: 90 TABLET | Refills: 0 | Status: SHIPPED | OUTPATIENT
Start: 2024-01-18

## 2024-02-02 ENCOUNTER — LAB VISIT (OUTPATIENT)
Dept: LAB | Facility: HOSPITAL | Age: 64
End: 2024-02-02
Attending: FAMILY MEDICINE
Payer: COMMERCIAL

## 2024-02-02 ENCOUNTER — OFFICE VISIT (OUTPATIENT)
Dept: FAMILY MEDICINE | Facility: CLINIC | Age: 64
End: 2024-02-02
Payer: COMMERCIAL

## 2024-02-02 VITALS
WEIGHT: 214.31 LBS | TEMPERATURE: 98 F | OXYGEN SATURATION: 94 % | HEART RATE: 109 BPM | SYSTOLIC BLOOD PRESSURE: 110 MMHG | DIASTOLIC BLOOD PRESSURE: 70 MMHG | BODY MASS INDEX: 39.19 KG/M2

## 2024-02-02 DIAGNOSIS — Z72.820 POOR SLEEP: ICD-10-CM

## 2024-02-02 DIAGNOSIS — Z00.00 ROUTINE MEDICAL EXAM: ICD-10-CM

## 2024-02-02 DIAGNOSIS — Z12.31 ENCOUNTER FOR SCREENING MAMMOGRAM FOR BREAST CANCER: ICD-10-CM

## 2024-02-02 DIAGNOSIS — L84 CALLUS OF TOE: ICD-10-CM

## 2024-02-02 DIAGNOSIS — Z00.00 GENERAL MEDICAL EXAM: ICD-10-CM

## 2024-02-02 DIAGNOSIS — Z00.00 VISIT FOR ANNUAL HEALTH EXAMINATION: Primary | ICD-10-CM

## 2024-02-02 DIAGNOSIS — I10 ESSENTIAL HYPERTENSION: ICD-10-CM

## 2024-02-02 DIAGNOSIS — E66.01 CLASS 2 SEVERE OBESITY DUE TO EXCESS CALORIES WITH SERIOUS COMORBIDITY AND BODY MASS INDEX (BMI) OF 38.0 TO 38.9 IN ADULT: ICD-10-CM

## 2024-02-02 PROBLEM — E66.812 CLASS 2 SEVERE OBESITY DUE TO EXCESS CALORIES WITH SERIOUS COMORBIDITY AND BODY MASS INDEX (BMI) OF 38.0 TO 38.9 IN ADULT: Status: ACTIVE | Noted: 2024-02-02

## 2024-02-02 PROCEDURE — 99999 PR PBB SHADOW E&M-EST. PATIENT-LVL V: CPT | Mod: PBBFAC,,,

## 2024-02-02 PROCEDURE — 1159F MED LIST DOCD IN RCRD: CPT | Mod: CPTII,S$GLB,,

## 2024-02-02 PROCEDURE — 3078F DIAST BP <80 MM HG: CPT | Mod: CPTII,S$GLB,,

## 2024-02-02 PROCEDURE — 4010F ACE/ARB THERAPY RXD/TAKEN: CPT | Mod: CPTII,S$GLB,,

## 2024-02-02 PROCEDURE — 99396 PREV VISIT EST AGE 40-64: CPT | Mod: S$GLB,,,

## 2024-02-02 PROCEDURE — 36415 COLL VENOUS BLD VENIPUNCTURE: CPT | Mod: PO | Performed by: FAMILY MEDICINE

## 2024-02-02 PROCEDURE — 3074F SYST BP LT 130 MM HG: CPT | Mod: CPTII,S$GLB,,

## 2024-02-02 PROCEDURE — 87389 HIV-1 AG W/HIV-1&-2 AB AG IA: CPT | Performed by: FAMILY MEDICINE

## 2024-02-02 PROCEDURE — 3008F BODY MASS INDEX DOCD: CPT | Mod: CPTII,S$GLB,,

## 2024-02-02 PROCEDURE — 86803 HEPATITIS C AB TEST: CPT | Performed by: FAMILY MEDICINE

## 2024-02-02 NOTE — ASSESSMENT & PLAN NOTE
Hctz 25mg, losartan 100mg    The current medical regimen is effective;  continue present plan and medications.  Well controlled in clinic, sometimes checks at home. Sometimes elevated if she isn't feeling well.

## 2024-02-02 NOTE — PATIENT INSTRUCTIONS
Sleep Hygiene:     1. Avoid watching TV, eating, and discussing emotional issues in bed. The bed should be used for sleep and sex only. If not, we can associate the bed with other activities and it often becomes difficult to fall asleep.  2. Minimize noise, light, and temperature extremes during sleep with ear plugs, window blinds, or an electric blanket or air conditioner. Even the slightest nighttime noises or luminescent lights can disrupt the quality of your sleep. Try to keep your bedroom at a comfortable temperature -- not too hot (above 75 degrees) or too cold (below 54 degrees).  3. Try not to drink fluids after 8 p.m. This may reduce awakenings due to urination.  4. Avoid naps, but if you do nap, make it no more than about 25 minutes about eight hours after you awake. But if you have problems falling asleep, then no naps for you.  5. Do not expose your self to bright light if you need to get up at night. Use a small night-light instead.  6. Nicotine is a stimulant and should be avoided particularly near bedtime and upon night awakenings. Having a smoke before bed, although it may feel relaxing, is actually putting a stimulant into your bloodstream.  7. Caffeine is also a stimulant and is present in coffee (100-200 mg), soda (50-75 mg), tea (50-75 mg), and various over-the-counter medications. Caffeine should be discontinued at least four to six hours before bedtime. If you consume large amounts of caffeine and you cut your self off too quickly, beware; you may get headaches that could keep you awake.  8. Although alcohol is a depressant and may help you fall asleep, the subsequent metabolism that clears it from your body when you are sleeping causes a withdrawal syndrome. This withdrawal causes awakenings and is often associated with nightmares and sweats.  9. A light snack may be sleep-inducing, but a heavy meal too close to bedtime interferes with sleep. Stay away from protein and stick to carbohydrates  or dairy products. Milk contains the amino acid L-tryptophan, which has been shown in research to help people go to sleep. So milk and cookies or crackers (without chocolate) may be useful and taste good as well.

## 2024-02-02 NOTE — PROGRESS NOTES
"    HPI     Chief Complaint:  No chief complaint on file.      Makenna Candelaria is a 64 y.o. female with multiple medical diagnoses as listed in the medical history and problem list that presents for annual.  Pt is new to me. PCP Dr Byrnes, also sees her for wt loss.       HPI    Labs from 11/2023: Vit D deficiency, TSH normal., CMP stable, lipid stable - Dr. Jay (cardiology). No diabetes.   HTN - losartan hctz  Wt loss - on wegovy, lost 35 lbs but also gained some back. Now eating more and would like to change. Tried adipex in the past. Will discuss with Dr. Byrnes for wt loss. Declines dietician and referral at this time.   Dr Castellanos - neurology. Trying to get regulated. Does injections and helping some. Ubrelvy  Poor sleep - 4-6 hours per night.     Social Factors  Tobacco use: No  Ready to Quit: n/a  Alcohol: Yes once a year  Intimate partner violence screening  "Do you feel safe in your current relationship?" Yes lives with fiance at home and daughter  "Have you ever been in a relationship in which your partner frightened you or hurt you?" N/a  Living Will/POA: No  Regular Exercise: No    Depression  Over the past two weeks, have you felt down, depressed, or hopeless? No, lexapro  Over the past two weeks, have you felt little interest or pleasure in doing things? No    Reproductive Health  Post menopausal  STD screening in last year: declines  Last PAP: 2016, due for PAP referral placed.   HIV screening: order placed will defer because reviewed labs from November of 2023 from cardiology.     CHD, HTN, DM2  CHD Risk Factors: dyslipidemia and hypertension  Women 45 years and older should be screened for dyslipidemia if at increased risk of CHD  Women 20 to 45 years of age should be screened for dyslipidemia if at increased risk of CHD  Asymptomatic adults with sustained blood pressure greater than 135/80 mm Hg (treated or untreated) should be screened for type 2 diabetes mellitus    Estimated body mass index " "is 39.19 kg/m² as calculated from the following:    Height as of 10/17/23: 5' 2" (1.575 m).    Weight as of this encounter: 97.2 kg (214 lb 4.6 oz).    Screening  Mammogram needed: last mmg 2021, due. Order placed.   Colonoscopy needed: ast in 2016, due in 2026  Osteoporosis screen needed: age  Women 50 to 74 years of age should be screened for breast cancer with mammography biennially.  Women should be screened for cervical cancer with Pap tests beginning at 21 years of age. Low-risk women should receive Pap testing every three years. Co-testing for human papillomavirus is an option beginning at 30 years of age, and can extend the screening interval to five years. Cervical cancer screening should be discontinued at 65 years of age or after total hysterectomy if the woman has a benign gynecologic history  Adults 50 to 75 years of age should be screened for colorectal cancer with an FOBT annually, sigmoidoscopy every five years with an FOBT every three years, or colonoscopy every 10 years.  Women 65 years and older should be screened for osteoporosis. Women younger than 65 years should be screened if the risk of fracture is greater than or equal to that of a 65-year-old white woman without additional risk factors.      Assessment & Plan       Problem List Items Addressed This Visit          Cardiac/Vascular    Essential hypertension    Current Assessment & Plan     Hctz 25mg, losartan 100mg    The current medical regimen is effective;  continue present plan and medications.  Well controlled in clinic, sometimes checks at home. Sometimes elevated if she isn't feeling well.             Endocrine    Class 2 severe obesity due to excess calories with serious comorbidity and body mass index (BMI) of 38.0 to 38.9 in adult  Diet/exercise. Discuss weight loss with Dr. Byrnes. Declines dietician.   Dietary changes  Intake of 1-1200 kcal/day for women (caloric deficit of 500-1000 per day)  Increase in physical activity  Examples " of physical activities and their respective rate of caloric expenditure for a 100 kg patient are: walking at 3 miles per hour (350 kcal/hour); bicycling on level ground at 10-12 miles per hour (600 kcal/hour); jogging at 5 miles per hour (800 kcal/hour); swimming freestyle for 1 standard lap (1000 kcal/hour); running 7.5 miles per hour (1200 kcal/hour).       Other Visit Diagnoses       Visit for annual health examination    -  Primary  Counseled on age appropriate medical preventative services including age appropriate cancer screenings, age appropriate eye and dental exams, over all nutritional health, need for a consistent exercise regimen, and an over all push towards maintaining a vigorous and active lifestyle.  Counseled on age appropriate vaccines and discussed upcoming health care needs based on age/gender. Discussed good sleep hygiene and stress management.      Routine medical exam      Due for PAP    Relevant Orders    Ambulatory referral/consult to Obstetrics / Gynecology    Encounter for screening mammogram for breast cancer      Due for MMG    Relevant Orders    Mammo Digital Screening Bilat w/ Ryne    Callus of toe      Callus of second toe left foot noted with hammer toe and toenail fungus. Referral placed.     Relevant Orders    Ambulatory referral/consult to Podiatry    Poor sleep      Sleep hygiene.             --------------------------------------------      Health Maintenance:  Health Maintenance         Date Due Completion Date    Hepatitis C Screening Never done ---    HIV Screening Never done ---    High Dose Statin Never done ---    Cervical Cancer Screening 11/19/2016 11/19/2015    Mammogram 12/30/2022 12/30/2021    COVID-19 Vaccine (2 - 2023-24 season) 09/01/2023 12/18/2021    Colorectal Cancer Screening 03/31/2026 3/31/2016    Hemoglobin A1c (Diabetic Prevention Screening) 11/20/2026 11/20/2023    Lipid Panel 11/20/2028 11/20/2023    TETANUS VACCINE 09/23/2031 9/23/2021            Health  maintenance reviewed and Mammogram ordered    Follow Up:  Follow up in about 3 months (around 2024) for appt with PCP for wt loss.    Discussed DDx, condition, and treatment.   Education sent to patient portal/included in after visit summary.  ED precautions given.   Notify provider if symptoms do not resolve or increase in severity.   Patient verbalizes understanding and agrees with plan of care.      Exam     Review of Systems:  (as noted above)  Review of Systems    Physical Exam:   Physical Exam  Vitals reviewed.   Constitutional:       Appearance: Normal appearance.   HENT:      Right Ear: Tympanic membrane normal.      Left Ear: Tympanic membrane normal.      Nose: Nose normal.   Cardiovascular:      Rate and Rhythm: Normal rate and regular rhythm.      Pulses: Normal pulses.      Heart sounds: Normal heart sounds. No murmur heard.  Pulmonary:      Effort: Pulmonary effort is normal. No respiratory distress.      Breath sounds: Normal breath sounds. No wheezing.   Musculoskeletal:         General: Normal range of motion.      Cervical back: Normal range of motion and neck supple.   Skin:     General: Skin is warm.      Capillary Refill: Capillary refill takes less than 2 seconds.      Comments: Callus second toe left foot   Neurological:      Mental Status: She is alert and oriented to person, place, and time.   Psychiatric:         Mood and Affect: Mood normal.       Vitals:    24 1313   BP: 110/70   Pulse: 109   Temp: 97.6 °F (36.4 °C)   TempSrc: Oral   SpO2: (!) 94%   Weight: 97.2 kg (214 lb 4.6 oz)      Body mass index is 39.19 kg/m².        History     Past Medical History:  Past Medical History:   Diagnosis Date    Breast cancer     Chronic headaches     Hypertension        Past Surgical History:  Past Surgical History:   Procedure Laterality Date    BREAST BIOPSY      BREAST LUMPECTOMY      BUNIONECTOMY       SECTION         Social History:  Social History     Socioeconomic History     Marital status: Single   Tobacco Use    Smoking status: Never    Smokeless tobacco: Never   Substance and Sexual Activity    Alcohol use: Not Currently    Drug use: Never    Sexual activity: Yes       Family History:  Family History   Problem Relation Age of Onset    Breast cancer Mother     Breast cancer Maternal Aunt     Breast cancer Maternal Aunt     Ovarian cancer Neg Hx     Colon cancer Neg Hx        Allergies and Medications: (updated and reviewed)  Review of patient's allergies indicates:   Allergen Reactions    Sulfa (sulfonamide antibiotics)      Current Outpatient Medications   Medication Sig Dispense Refill    EScitalopram oxalate (LEXAPRO) 20 MG tablet TAKE 1 TABLET(20 MG) BY MOUTH EVERY DAY 90 tablet 1    fremanezumab-vfrm (AJOVY SYRINGE) 225 mg/1.5 mL injection INJECT EVERY 28 DAYS. 1.5 mL 3    hydroCHLOROthiazide (HYDRODIURIL) 25 MG tablet TAKE 1 TABLET(25 MG) BY MOUTH EVERY DAY 90 tablet 0    ketorolac (SPRIX) 15.75 mg/spray Spry Spray in 1 nostril every 6 hours as needed for severe migraine. Do not exceed 4 times a day or more than 3 times a week 5 each 5    losartan (COZAAR) 100 MG tablet TAKE 1 TABLET(100 MG) BY MOUTH EVERY DAY 90 tablet 0    meloxicam (MOBIC) 15 MG tablet Take 15 mg by mouth every morning.      omeprazole (PRILOSEC) 20 MG capsule TAKE 1 CAPSULE BY MOUTH DAILY AS NEEDED 90 capsule 1    ondansetron (ZOFRAN-ODT) 4 MG TbDL Take by mouth.      semaglutide, weight loss, (WEGOVY) 2.4 mg/0.75 mL PnIj ADMINISTER 0.75 ML UNDER THE SKIN EVERY 7 DAYS 9 mL 1    UBRELVY 100 mg tablet Take by mouth.      loratadine (CLARITIN) 10 mg tablet Take 1 tablet (10 mg total) by mouth daily as needed for Allergies (or runny nose). (Patient not taking: Reported on 3/23/2023) 30 tablet 0     No current facility-administered medications for this visit.       Patient Care Team:  Elizabeth Byrnes MD as PCP - General (Family Medicine)  Tina Metzger MA as Care Coordinator  [facility]  [facility] (Gastroenterology)         - The patient is given an After Visit Summary that lists all medications with directions, allergies, education, orders placed during this encounter and follow-up instructions.      - I have reviewed the patient's medical information including past medical, family, and social history sections including the medications and allergies.      - We discussed the patient's current medications.     This note was created by combination of typed  and MModal dictation.  Transcription errors may be present.  If there are any questions, please contact me.

## 2024-02-03 LAB
HCV AB SERPL QL IA: NORMAL
HIV 1+2 AB+HIV1 P24 AG SERPL QL IA: NORMAL

## 2024-02-14 ENCOUNTER — OFFICE VISIT (OUTPATIENT)
Dept: URGENT CARE | Facility: CLINIC | Age: 64
End: 2024-02-14
Payer: COMMERCIAL

## 2024-02-14 VITALS
OXYGEN SATURATION: 95 % | HEART RATE: 90 BPM | TEMPERATURE: 98 F | WEIGHT: 212 LBS | SYSTOLIC BLOOD PRESSURE: 157 MMHG | DIASTOLIC BLOOD PRESSURE: 97 MMHG | HEIGHT: 62 IN | BODY MASS INDEX: 39.01 KG/M2 | RESPIRATION RATE: 20 BRPM

## 2024-02-14 DIAGNOSIS — R09.82 POST-NASAL DRIP: ICD-10-CM

## 2024-02-14 DIAGNOSIS — R09.81 NASAL CONGESTION: ICD-10-CM

## 2024-02-14 DIAGNOSIS — J06.9 VIRAL URI WITH COUGH: Primary | ICD-10-CM

## 2024-02-14 DIAGNOSIS — J31.0 RHINITIS, UNSPECIFIED TYPE: ICD-10-CM

## 2024-02-14 LAB
CTP QC/QA: YES
CTP QC/QA: YES
POC MOLECULAR INFLUENZA A AGN: NEGATIVE
POC MOLECULAR INFLUENZA B AGN: NEGATIVE
SARS-COV-2 AG RESP QL IA.RAPID: NEGATIVE

## 2024-02-14 PROCEDURE — 87502 INFLUENZA DNA AMP PROBE: CPT | Mod: QW,S$GLB,, | Performed by: NURSE PRACTITIONER

## 2024-02-14 PROCEDURE — 87811 SARS-COV-2 COVID19 W/OPTIC: CPT | Mod: QW,S$GLB,, | Performed by: NURSE PRACTITIONER

## 2024-02-14 PROCEDURE — 99213 OFFICE O/P EST LOW 20 MIN: CPT | Mod: S$GLB,,, | Performed by: NURSE PRACTITIONER

## 2024-02-14 RX ORDER — ATOGEPANT 60 MG/1
1 TABLET ORAL DAILY
COMMUNITY

## 2024-02-14 RX ORDER — LORATADINE 10 MG/1
10 TABLET ORAL DAILY PRN
Qty: 30 TABLET | Refills: 0 | Status: SHIPPED | OUTPATIENT
Start: 2024-02-14 | End: 2025-02-13

## 2024-02-14 RX ORDER — FLUTICASONE PROPIONATE 50 MCG
2 SPRAY, SUSPENSION (ML) NASAL DAILY PRN
Qty: 15.8 ML | Refills: 0 | Status: SHIPPED | OUTPATIENT
Start: 2024-02-14

## 2024-02-16 ENCOUNTER — OFFICE VISIT (OUTPATIENT)
Dept: FAMILY MEDICINE | Facility: CLINIC | Age: 64
End: 2024-02-16
Payer: COMMERCIAL

## 2024-02-16 VITALS
SYSTOLIC BLOOD PRESSURE: 134 MMHG | HEART RATE: 101 BPM | WEIGHT: 211 LBS | TEMPERATURE: 98 F | BODY MASS INDEX: 38.83 KG/M2 | OXYGEN SATURATION: 96 % | DIASTOLIC BLOOD PRESSURE: 70 MMHG | HEIGHT: 62 IN

## 2024-02-16 DIAGNOSIS — E66.01 CLASS 2 SEVERE OBESITY DUE TO EXCESS CALORIES WITH SERIOUS COMORBIDITY AND BODY MASS INDEX (BMI) OF 38.0 TO 38.9 IN ADULT: Primary | ICD-10-CM

## 2024-02-16 DIAGNOSIS — F32.A DEPRESSION, UNSPECIFIED DEPRESSION TYPE: ICD-10-CM

## 2024-02-16 DIAGNOSIS — G43.109 MIGRAINE WITH AURA AND WITHOUT STATUS MIGRAINOSUS, NOT INTRACTABLE: ICD-10-CM

## 2024-02-16 DIAGNOSIS — I10 ESSENTIAL HYPERTENSION: ICD-10-CM

## 2024-02-16 PROCEDURE — 3078F DIAST BP <80 MM HG: CPT | Mod: CPTII,S$GLB,, | Performed by: FAMILY MEDICINE

## 2024-02-16 PROCEDURE — 3008F BODY MASS INDEX DOCD: CPT | Mod: CPTII,S$GLB,, | Performed by: FAMILY MEDICINE

## 2024-02-16 PROCEDURE — 1160F RVW MEDS BY RX/DR IN RCRD: CPT | Mod: CPTII,S$GLB,, | Performed by: FAMILY MEDICINE

## 2024-02-16 PROCEDURE — 99999 PR PBB SHADOW E&M-EST. PATIENT-LVL V: CPT | Mod: PBBFAC,,, | Performed by: FAMILY MEDICINE

## 2024-02-16 PROCEDURE — 3075F SYST BP GE 130 - 139MM HG: CPT | Mod: CPTII,S$GLB,, | Performed by: FAMILY MEDICINE

## 2024-02-16 PROCEDURE — 4010F ACE/ARB THERAPY RXD/TAKEN: CPT | Mod: CPTII,S$GLB,, | Performed by: FAMILY MEDICINE

## 2024-02-16 PROCEDURE — 1159F MED LIST DOCD IN RCRD: CPT | Mod: CPTII,S$GLB,, | Performed by: FAMILY MEDICINE

## 2024-02-16 PROCEDURE — 99214 OFFICE O/P EST MOD 30 MIN: CPT | Mod: S$GLB,,, | Performed by: FAMILY MEDICINE

## 2024-02-16 RX ORDER — TIRZEPATIDE 10 MG/.5ML
10 INJECTION, SOLUTION SUBCUTANEOUS
Qty: 13 PEN | Refills: 3 | Status: SHIPPED | OUTPATIENT
Start: 2024-02-16 | End: 2024-04-12 | Stop reason: SDUPTHER

## 2024-02-16 RX ORDER — TIRZEPATIDE 7.5 MG/.5ML
7.5 INJECTION, SOLUTION SUBCUTANEOUS
Qty: 4 PEN | Refills: 0 | Status: SHIPPED | OUTPATIENT
Start: 2024-02-16 | End: 2024-04-22

## 2024-02-16 RX ORDER — TIRZEPATIDE 7.5 MG/.5ML
7.5 INJECTION, SOLUTION SUBCUTANEOUS
Qty: 13 PEN | Refills: 0 | Status: SHIPPED | OUTPATIENT
Start: 2024-02-16 | End: 2024-02-16 | Stop reason: SDUPTHER

## 2024-02-16 NOTE — PROGRESS NOTES
"Routine Office Visit    Makenna Candelaria  1960  [id number]      Subjective     Makenna is a 64 y.o. female who presents today for:    Weight - Patient started wegovy in February 2022 and initially lost 20 pounds. Patient has been finding it more difficult to lose weight. She feels she would benefit during this lenten season - she has given up sodas and candy.     Diets tried - weight watchers, sensible meals; Mediterranean diet.   Medications tried - adipex - Patient was not able to sleep with medication. She felt side effects of anxiety.   Wegovy - worked initially; Patient lost 20#. She denies any side effects. Feels it is no longer working. No previous history of pancreatitis / family hx of MEN      6/30/2022 - 205 8/5/2022 - 200  3/23/2023 - 209 4/16/2024 - 210     Weight goal - 180 (changed 3/23/2023)      Sleep - 6-8 hours of sleep     Exercise - none; working on house; so Patient is active.      Work - , collecting taxes. Patient drives and walks     Objective     Review of Systems   Constitutional:  Negative for chills and fever.   HENT:  Negative for congestion.    Eyes:  Negative for blurred vision.   Respiratory:  Negative for cough.    Cardiovascular:  Negative for chest pain.   Gastrointestinal:  Negative for abdominal pain, constipation, diarrhea, heartburn, nausea and vomiting.   Genitourinary:  Negative for dysuria.   Musculoskeletal:  Negative for myalgias.   Skin:  Negative for itching and rash.   Neurological:  Negative for dizziness and headaches.   Psychiatric/Behavioral:  Negative for depression.      /70   Pulse 101   Temp 97.7 °F (36.5 °C)   Ht 5' 2" (1.575 m)   Wt 95.7 kg (210 lb 15.7 oz)   SpO2 96%   BMI 38.59 kg/m²   Physical Exam  Constitutional:       Appearance: She is well-developed.   HENT:      Head: Normocephalic and atraumatic.   Eyes:      Conjunctiva/sclera: Conjunctivae normal.      Pupils: Pupils are equal, round, and reactive to light.   Cardiovascular:    "   Rate and Rhythm: Normal rate and regular rhythm.      Heart sounds: Normal heart sounds. No murmur heard.     No friction rub. No gallop.   Pulmonary:      Effort: No respiratory distress.      Breath sounds: Normal breath sounds.   Abdominal:      General: Bowel sounds are normal. There is no distension.      Palpations: Abdomen is soft.      Tenderness: There is no abdominal tenderness.   Musculoskeletal:         General: Normal range of motion.      Cervical back: Normal range of motion and neck supple.   Lymphadenopathy:      Cervical: No cervical adenopathy.   Skin:     General: Skin is warm.   Neurological:      Mental Status: She is alert and oriented to person, place, and time.             Assessment     Health Maintenance         Date Due Completion Date    High Dose Statin Never done ---    Cervical Cancer Screening 11/19/2016 11/19/2015    Mammogram 12/30/2022 12/30/2021    COVID-19 Vaccine (2 - 2023-24 season) 09/01/2023 12/18/2021    Colorectal Cancer Screening 03/31/2026 3/31/2016    Hemoglobin A1c (Diabetic Prevention Screening) 11/20/2026 11/20/2023    Lipid Panel 11/20/2028 11/20/2023    TETANUS VACCINE 09/23/2031 9/23/2021              Problem List Items Addressed This Visit          Neuro    Migraines    Overview     She continues to follow-up with neurology.   The current medical regimen is effective;  continue present plan and medications.             Cardiac/Vascular    Essential hypertension  The current medical regimen is effective;  continue present plan and medications.          Endocrine    Class 2 severe obesity due to excess calories with serious comorbidity and body mass index (BMI) of 38.0 to 38.9 in adult - Primary    Relevant Medications    tirzepatide, weight loss, (ZEPBOUND) 10 mg/0.5 mL PnIj    tirzepatide, weight loss, (ZEPBOUND) 7.5 mg/0.5 mL PnIj  Common side effects of this medication were discussed with the patient. Questions regarding medications were discussed during this  visit.    Advised that diet changes were needed to lose weight.   Increase activity   Increase whole foods   Decrease processed fods        Other Visit Diagnoses       Depression, unspecified depression type      The current medical regimen is effective;  continue present plan and medications.   Consider changing lexapro if weight continues to be a concern                   Follow up if symptoms worsen or fail to improve.

## 2024-02-23 DIAGNOSIS — I10 ESSENTIAL HYPERTENSION: ICD-10-CM

## 2024-02-23 RX ORDER — HYDROCHLOROTHIAZIDE 25 MG/1
TABLET ORAL
Qty: 90 TABLET | Refills: 0 | Status: SHIPPED | OUTPATIENT
Start: 2024-02-23 | End: 2024-05-23

## 2024-02-23 NOTE — TELEPHONE ENCOUNTER
Refill Routing Note   Medication(s) are not appropriate for processing by [facility] for the following reason(s):        Required labs outdated  No active prescription written by provider    [facility] action(s):  Defer     Requires labs : Yes             Appointments  past 12m or future 3m with PCP    Date Provider   Last Visit   2/16/2024 Elizabeth Byrnes MD   Next Visit   6/18/2024 Elizabeth Byrnes MD   ED visits in past 90 days: 0        Note composed:7:01 AM 02/23/2024

## 2024-02-23 NOTE — TELEPHONE ENCOUNTER
Care Due:                  Date            Visit Type   Department     Provider  --------------------------------------------------------------------------------                                             [facility]                              NEW PATIENT   MED/ INTERNAL  Last Visit: 02-      - BARIATRIC  MED/ PEDS      Elizabeth Byrnes                              EP -         [facility]                              PRIMARY      MED/ INTERNAL  Next Visit: 06-      CARE ([facility])   MED/ PEDS      Elizabeth Byrnes                                                            Last  Test          Frequency    Reason                     Performed    Due Date  --------------------------------------------------------------------------------    CMP.........  12 months..  hydroCHLOROthiazide,       06-   06-                             losartan.................    HBA1C.......  6 months...  tirzepatide,.............  06- 12-    [facility] Embedded Care Due Messages. Reference number: [id number].   2/23/2024 3:26:27 AM CST

## 2024-03-24 DIAGNOSIS — F32.A DEPRESSION, UNSPECIFIED DEPRESSION TYPE: ICD-10-CM

## 2024-03-24 NOTE — TELEPHONE ENCOUNTER
No care due was identified.  Health [facility] Embedded Care Due Messages. Reference number: [id number].   3/24/2024 5:57:08 AM CDT

## 2024-03-25 RX ORDER — ESCITALOPRAM OXALATE 20 MG/1
20 TABLET ORAL
Qty: 90 TABLET | Refills: 3 | Status: SHIPPED | OUTPATIENT
Start: 2024-03-25

## 2024-03-25 NOTE — TELEPHONE ENCOUNTER
Refill Decision Note   Makenna Candelaria  is requesting a refill authorization.  Brief Assessment and Rationale for Refill:  Approve     Medication Therapy Plan:         Comments:     Note composed:3:04 PM 03/25/2024

## 2024-04-08 DIAGNOSIS — E66.01 CLASS 2 SEVERE OBESITY DUE TO EXCESS CALORIES WITH SERIOUS COMORBIDITY AND BODY MASS INDEX (BMI) OF 38.0 TO 38.9 IN ADULT: ICD-10-CM

## 2024-04-08 NOTE — TELEPHONE ENCOUNTER
Refill request was for wegovy?    She is on zepbound   I also sent in zepbound 10mg to pharmacy on day she saw me   Why is refill request for zepbound 7.5 ?  She shouldn't need it and should contact pharmacy for higher does that is likely on file

## 2024-04-08 NOTE — TELEPHONE ENCOUNTER
----- Message from Lalitha Gallo sent at 4/8/2024 12:35 PM CDT -----  Regarding: self [phone]  Type: RX Refill Request    Who Called:  self     Have you contacted your pharmacy yes    Refill or New Rx: refill    RX Name and Strength:semaglutide, weight loss, (WEGOVY) 0.25 mg/0.5 mL PnIj    Preferred Pharmacy with phone number:   [id number] - [place] - [address] [facility] [address] [address]  [address]  [place] [address]  Phone: [phone] Fax: [phone]      Local or Mail Order: local     Would the patient rather a call back or a response via [facility]?  Call back     Best Call Back Number: [phone]

## 2024-04-08 NOTE — TELEPHONE ENCOUNTER
No care due was identified.  [facility] Embedded Care Due Messages. Reference number: [id number].   4/08/2024 1:38:20 PM CDT

## 2024-04-09 RX ORDER — TIRZEPATIDE 7.5 MG/.5ML
7.5 INJECTION, SOLUTION SUBCUTANEOUS
Qty: 4 PEN | Refills: 0 | OUTPATIENT
Start: 2024-04-09

## 2024-04-09 NOTE — TELEPHONE ENCOUNTER
Please contact pharmacy   She should have zepbound 10mg on file   If so, medication should be filled

## 2024-04-12 DIAGNOSIS — E66.01 CLASS 2 SEVERE OBESITY DUE TO EXCESS CALORIES WITH SERIOUS COMORBIDITY AND BODY MASS INDEX (BMI) OF 38.0 TO 38.9 IN ADULT: ICD-10-CM

## 2024-04-12 RX ORDER — TIRZEPATIDE 10 MG/.5ML
10 INJECTION, SOLUTION SUBCUTANEOUS
Qty: 13 PEN | Refills: 3 | Status: SHIPPED | OUTPATIENT
Start: 2024-04-12 | End: 2024-04-22 | Stop reason: SDUPTHER

## 2024-04-12 NOTE — TELEPHONE ENCOUNTER
No care due was identified.  [facility] Embedded Care Due Messages. Reference number: [id number].   4/12/2024 10:57:28 AM CDT

## 2024-04-12 NOTE — TELEPHONE ENCOUNTER
----- Message from Joan Jo sent at 4/12/2024 10:17 AM CDT -----  Type: Patient Call Back    Who called: self     What is the request in detail: patient is requesting the a script for tirzepatide, weight loss, (ZEPBOUND) 7.5 mg/0.5 mL PnIj. Please call    Can the clinic reply by [facility]? no    Would the patient rather a call back or a response via [facility]?  call    Best call back number: .[phone] (home)      Additional Information:

## 2024-04-17 ENCOUNTER — TELEPHONE (OUTPATIENT)
Dept: FAMILY MEDICINE | Facility: CLINIC | Age: 64
End: 2024-04-17
Payer: COMMERCIAL

## 2024-04-17 NOTE — TELEPHONE ENCOUNTER
----- Message from Doug Holcomb sent at 4/17/2024 12:09 PM CDT -----  Regarding: Makenna  Type:Patient Callback     Who called: Makenna     What is the request in detail: Pt stated that she would like for the nurse to give her a call about the Wegovy injections Please reach out to her.     Can the clinic reply by [facility]? Yes     Would the patient rather a call back or a response via [facility]? Callback     Best call back number: [phone]      Additional Information:

## 2024-04-22 DIAGNOSIS — E66.01 CLASS 2 SEVERE OBESITY DUE TO EXCESS CALORIES WITH SERIOUS COMORBIDITY AND BODY MASS INDEX (BMI) OF 38.0 TO 38.9 IN ADULT: ICD-10-CM

## 2024-04-22 RX ORDER — TIRZEPATIDE 10 MG/.5ML
10 INJECTION, SOLUTION SUBCUTANEOUS
Qty: 12 PEN | Refills: 0 | Status: ACTIVE | OUTPATIENT
Start: 2024-04-22

## 2024-04-22 NOTE — TELEPHONE ENCOUNTER
----- Message from Kris Ornelas sent at 4/22/2024  9:12 AM CDT -----  Regarding: Self .[phone]   Type: RX Refill Request    Who Called: self    Have you contacted your pharmacy: yes    Refill    RX Name and Strength:tirzepatide, weight loss, (ZEPBOUND) 10 mg/0.5 mL PnIj    Preferred Pharmacy with phone number: .      [facility]  [address] [address]  [place] [address]  Phone: [phone] Fax: [phone]          Local or Mail Order: mail order    Would the patient rather a call back or a response via [facility]? Call back    Best Call Back Number:.[phone]      Additional Information:     Thank you.

## 2024-04-22 NOTE — TELEPHONE ENCOUNTER
No care due was identified.  [facility] Embedded Care Due Messages. Reference number: [id number].   4/22/2024 11:13:31 AM CDT

## 2024-05-23 DIAGNOSIS — I10 ESSENTIAL HYPERTENSION: ICD-10-CM

## 2024-05-23 RX ORDER — HYDROCHLOROTHIAZIDE 25 MG/1
TABLET ORAL
Qty: 90 TABLET | Refills: 0 | Status: SHIPPED | OUTPATIENT
Start: 2024-05-23

## 2024-05-23 NOTE — TELEPHONE ENCOUNTER
Refill Routing Note   Medication(s) are not appropriate for processing by [facility] for the following reason(s):        Required labs outdated    [facility] action(s):  Defer     Requires labs : Yes             Appointments  past 12m or future 3m with PCP    Date Provider   Last Visit   2/16/2024 Elizabeth Byrnes MD   Next Visit   6/18/2024 Elizabeth Byrnes MD   ED visits in past 90 days: 0        Note composed:11:03 AM 05/23/2024

## 2024-05-23 NOTE — TELEPHONE ENCOUNTER
Care Due:                  Date            Visit Type   Department     Provider  --------------------------------------------------------------------------------                                             [facility]                              NEW PATIENT   MED/ INTERNAL  Last Visit: 02-      - BARIATRIC  MED/ PEDS      Elizabeth Byrnes                              EP -         [facility]                              PRIMARY      MED/ INTERNAL  Next Visit: 06-      CARE ([facility])   MED/ PEDS      Elizabeth Byrnes                                                            Last  Test          Frequency    Reason                     Performed    Due Date  --------------------------------------------------------------------------------    CMP.........  12 months..  hydroCHLOROthiazide,       06-   06-                             losartan.................    HBA1C.......  6 months...  tirzepatide,.............  06- 12-    [facility] Embedded Care Due Messages. Reference number: [id number].   5/23/2024 5:56:27 AM CDT

## 2024-06-03 ENCOUNTER — PATIENT MESSAGE (OUTPATIENT)
Dept: FAMILY MEDICINE | Facility: CLINIC | Age: 64
End: 2024-06-03
Payer: COMMERCIAL

## 2024-06-03 ENCOUNTER — TELEPHONE (OUTPATIENT)
Dept: FAMILY MEDICINE | Facility: CLINIC | Age: 64
End: 2024-06-03
Payer: COMMERCIAL

## 2024-06-03 NOTE — TELEPHONE ENCOUNTER
Dr. Byrnes is out of the office today.  She will need to discuss this change with Dr. Byrnes at her appointment on June 18.

## 2024-06-03 NOTE — TELEPHONE ENCOUNTER
Spoke with patient and she is requesting to have Zepbound to Wegovy due to Zepbound is always out of stock. Please reach out to patient if she needs to come in are have any blood work done for this to happen.

## 2024-06-03 NOTE — TELEPHONE ENCOUNTER
----- Message from Aaliyah Lakhani sent at 6/3/2024  1:53 PM CDT -----  Regarding: call back  Type: Patient Call Back    Who called: pt     What is the request in detail: requesting call to discuss switching from Zepbound to Wegovy     Can the clinic reply by [facility]?no    Would the patient rather a call back or a response via [facility]? call    Best call back number: [phone]      Additional Information:

## 2024-06-21 ENCOUNTER — OFFICE VISIT (OUTPATIENT)
Dept: FAMILY MEDICINE | Facility: CLINIC | Age: 64
End: 2024-06-21
Payer: COMMERCIAL

## 2024-06-21 DIAGNOSIS — E66.01 CLASS 2 SEVERE OBESITY DUE TO EXCESS CALORIES WITH SERIOUS COMORBIDITY AND BODY MASS INDEX (BMI) OF 38.0 TO 38.9 IN ADULT: ICD-10-CM

## 2024-06-21 DIAGNOSIS — I10 ESSENTIAL HYPERTENSION: Primary | ICD-10-CM

## 2024-06-21 DIAGNOSIS — F32.A DEPRESSION, UNSPECIFIED DEPRESSION TYPE: ICD-10-CM

## 2024-06-21 DIAGNOSIS — G43.109 MIGRAINE WITH AURA AND WITHOUT STATUS MIGRAINOSUS, NOT INTRACTABLE: ICD-10-CM

## 2024-06-21 RX ORDER — SEMAGLUTIDE 1 MG/.5ML
1 INJECTION, SOLUTION SUBCUTANEOUS
Qty: 2 ML | Refills: 0 | Status: SHIPPED | OUTPATIENT
Start: 2024-06-21

## 2024-06-21 RX ORDER — SEMAGLUTIDE 0.5 MG/.5ML
0.5 INJECTION, SOLUTION SUBCUTANEOUS
Qty: 2 ML | Refills: 0 | Status: SHIPPED | OUTPATIENT
Start: 2024-06-21

## 2024-06-21 RX ORDER — SEMAGLUTIDE 2.4 MG/.75ML
2.4 INJECTION, SOLUTION SUBCUTANEOUS
Qty: 3 ML | Refills: 0 | Status: SHIPPED | OUTPATIENT
Start: 2024-06-21

## 2024-06-21 RX ORDER — SEMAGLUTIDE 1.7 MG/.75ML
1.7 INJECTION, SOLUTION SUBCUTANEOUS
Qty: 3 ML | Refills: 0 | Status: SHIPPED | OUTPATIENT
Start: 2024-06-21

## 2024-06-21 NOTE — PROGRESS NOTES
Telemedicine Office Visit    Makenna Candelaria    1960  [id number]    Subjective     The patient location is: work   The chief complaint leading to consultation is: weight   Visit type: Virtual visit with synchronous audio and video  Total time spent with patient: 15 minutes   Each patient to whom he or she provides medical services by telemedicine is:  (1) informed of the relationship between the physician and patient and the respective role of any other health care provider with respect to management of the patient; and (2) notified that he or she may decline to receive medical services by telemedicine and may withdraw from such care at any time.    Makenna is a 64 y.o. female who presents through telemedicine for:     Weight - Patient started wegovy in February 2022 and initially lost 20 pounds. Patient has been finding it more difficult to lose weight. She has gained weight since being switch to zepbound because she has not been able to get medication. Medication has been on backorder.     Diets tried - weight watchers, sensible meals; Mediterranean diet.   Medications tried - adipex - Patient was not able to sleep with medication. She felt side effects of anxiety.   Wegovy - worked initially; Patient lost 20#. She denies any side effects. Feels it is no longer working. No previous history of pancreatitis / family hx of MEN   Zepbound - unable to continue -  back order      6/30/2022 - 205 8/5/2022 - 200  3/23/2023 - 209  4/16/2024 - 210  6/21/2024 - 225     Weight goal - 180 (changed 3/23/2023)      Sleep - 6-8 hours of sleep      Exercise - does 5 flights of stairs (up and down)  every morning. Takes approximately 15 minutes      Work - , collecting taxes. Patient drives and walks    Objective     Answers submitted by the patient for this visit:  Review of Systems Questionnaire (Submitted on 6/21/2024)  activity change: No  unexpected weight change: Yes  rhinorrhea: No  trouble swallowing:  No  visual disturbance: No  chest tightness: No  polyuria: No  difficulty urinating: No  menstrual problem: No  joint swelling: No  arthralgias: No  confusion: No  dysphoric mood: No    Review of Systems   Constitutional:  Negative for chills and fever.   HENT:  Negative for congestion and hearing loss.    Eyes:  Negative for blurred vision and discharge.   Respiratory:  Negative for cough and wheezing.    Cardiovascular:  Negative for chest pain and palpitations.   Gastrointestinal:  Negative for abdominal pain, blood in stool, constipation, diarrhea, heartburn, nausea and vomiting.   Genitourinary:  Negative for dysuria and hematuria.   Musculoskeletal:  Negative for myalgias and neck pain.   Skin:  Negative for itching and rash.   Neurological:  Positive for headaches. Negative for dizziness and weakness.   Endo/Heme/Allergies:  Negative for polydipsia.   Psychiatric/Behavioral:  Negative for depression.        There were no vitals taken for this visit.  Physical Exam  Constitutional:       Appearance: She is well-developed.   HENT:      Head: Normocephalic and atraumatic.      Right Ear: External ear normal.      Left Ear: External ear normal.   Eyes:      Conjunctiva/sclera: Conjunctivae normal.   Pulmonary:      Effort: Pulmonary effort is normal.   Musculoskeletal:      Cervical back: Normal range of motion.   Skin:     Coloration: Skin is not pale.   Neurological:      Mental Status: She is alert and oriented to person, place, and time.   Psychiatric:         Behavior: Behavior normal.         Thought Content: Thought content normal.         Judgment: Judgment normal.           Plan     Problem List Items Addressed This Visit          Neuro    Migraines    Overview     Patient is losing headache control on her current dose of topiramate.  She has had improved prevention with Elavil. Patient asked to consider Ubrelvy as her abortive therapy.            Psychiatric    Depression  The current medical regimen is  effective;  continue present plan and medications.          Cardiac/Vascular    Essential hypertension - Primary  The current medical regimen is effective;  continue present plan and medications.          Endocrine    Class 2 severe obesity due to excess calories with serious comorbidity and body mass index (BMI) of 38.0 to 38.9 in adult    Relevant Medications    semaglutide, weight loss, (WEGOVY) 0.5 mg/0.5 mL PnIj    semaglutide, weight loss, (WEGOVY) 1 mg/0.5 mL PnIj    semaglutide, weight loss, (WEGOVY) 1.7 mg/0.75 mL PnIj    semaglutide, weight loss, (WEGOVY) 2.4 mg/0.75 mL PnIj  Common side effects of this medication were discussed with the patient. Questions regarding medications were discussed during this visit.    The patient is asked to make an attempt to improve diet and exercise patterns to aid in medical management of this problem.   Increase activity            Follow up in about 3 months (around 9/21/2024), or if symptoms worsen or fail to improve.

## 2024-10-07 DIAGNOSIS — E66.01 CLASS 2 SEVERE OBESITY DUE TO EXCESS CALORIES WITH SERIOUS COMORBIDITY AND BODY MASS INDEX (BMI) OF 38.0 TO 38.9 IN ADULT: ICD-10-CM

## 2024-10-07 DIAGNOSIS — E66.812 CLASS 2 SEVERE OBESITY DUE TO EXCESS CALORIES WITH SERIOUS COMORBIDITY AND BODY MASS INDEX (BMI) OF 38.0 TO 38.9 IN ADULT: ICD-10-CM

## 2024-10-07 NOTE — TELEPHONE ENCOUNTER
Care Due:                  Date            Visit Type   Department     Provider  --------------------------------------------------------------------------------                                ESTABLISHED   [facility]                              PATIENT -    MED/ INTERNAL  Last Visit: 06-      VIRTUAL      MED/ PEDS      Elizabeth Byrnes  Next Visit: None Scheduled  None         None Found                                                            Last  Test          Frequency    Reason                     Performed    Due Date  --------------------------------------------------------------------------------    HBA1C.......  6 months...  semaglutide,.............  06- 12-    [facility] Embedded Care Due Messages. Reference number: [id number].   10/07/2024 12:13:52 PM CDT

## 2024-10-09 ENCOUNTER — CLINICAL SUPPORT (OUTPATIENT)
Dept: OTHER | Facility: CLINIC | Age: 64
End: 2024-10-09

## 2024-10-09 DIAGNOSIS — Z00.8 ENCOUNTER FOR OTHER GENERAL EXAMINATION: ICD-10-CM

## 2024-10-09 RX ORDER — SEMAGLUTIDE 2.4 MG/.75ML
2.4 INJECTION, SOLUTION SUBCUTANEOUS
Qty: 3 ML | Refills: 0 | Status: SHIPPED | OUTPATIENT
Start: 2024-10-09

## 2024-10-10 VITALS
HEIGHT: 62 IN | SYSTOLIC BLOOD PRESSURE: 130 MMHG | DIASTOLIC BLOOD PRESSURE: 84 MMHG | BODY MASS INDEX: 37.54 KG/M2 | WEIGHT: 204 LBS

## 2024-10-10 LAB
GLUCOSE SERPL-MCNC: 98 MG/DL (ref 60–140)
HDLC SERPL-MCNC: 35 MG/DL
POC CHOLESTEROL, LDL (DOCK): 91 MG/DL
POC CHOLESTEROL, TOTAL: 152 MG/DL
TRIGL SERPL-MCNC: 149 MG/DL

## 2024-11-06 DIAGNOSIS — E66.812 CLASS 2 SEVERE OBESITY DUE TO EXCESS CALORIES WITH SERIOUS COMORBIDITY AND BODY MASS INDEX (BMI) OF 38.0 TO 38.9 IN ADULT: ICD-10-CM

## 2024-11-06 DIAGNOSIS — E66.01 CLASS 2 SEVERE OBESITY DUE TO EXCESS CALORIES WITH SERIOUS COMORBIDITY AND BODY MASS INDEX (BMI) OF 38.0 TO 38.9 IN ADULT: ICD-10-CM

## 2024-11-06 RX ORDER — SEMAGLUTIDE 2.4 MG/.75ML
2.4 INJECTION, SOLUTION SUBCUTANEOUS
Qty: 3 ML | Refills: 0 | Status: SHIPPED | OUTPATIENT
Start: 2024-11-06

## 2024-11-06 NOTE — TELEPHONE ENCOUNTER
Care Due:                  Date            Visit Type   Department     Provider  --------------------------------------------------------------------------------                                ESTABLISHED   [facility]                              PATIENT -    MED/ INTERNAL  Last Visit: 06-      VIRTUAL      MED/ PEDS      Elizabeth Byrnes  Next Visit: None Scheduled  None         None Found                                                            Last  Test          Frequency    Reason                     Performed    Due Date  --------------------------------------------------------------------------------    CMP.........  12 months..  hydroCHLOROthiazide,       Not Found    Overdue                             losartan.................    [employer] Embedded Care Due Messages. Reference number: [id number].   11/06/2024 8:46:07 AM CST

## 2024-11-06 NOTE — TELEPHONE ENCOUNTER
Refill Routing Note   Medication(s) are not appropriate for processing by [facility] for the following reason(s):        Required labs outdated    [facility] action(s):  Defer   Requires labs : Yes               Appointments  past 12m or future 3m with PCP    Date Provider   Last Visit   6/21/2024 Elizabeth Byrnes MD   Next Visit   Visit date not found Elizabeth Byrnes MD   ED visits in past 90 days: 0        Note composed:12:40 PM 11/06/2024

## 2024-11-27 DIAGNOSIS — I10 ESSENTIAL HYPERTENSION: ICD-10-CM

## 2024-12-03 DIAGNOSIS — E66.01 CLASS 2 SEVERE OBESITY DUE TO EXCESS CALORIES WITH SERIOUS COMORBIDITY AND BODY MASS INDEX (BMI) OF 38.0 TO 38.9 IN ADULT: ICD-10-CM

## 2024-12-03 DIAGNOSIS — E66.812 CLASS 2 SEVERE OBESITY DUE TO EXCESS CALORIES WITH SERIOUS COMORBIDITY AND BODY MASS INDEX (BMI) OF 38.0 TO 38.9 IN ADULT: ICD-10-CM

## 2024-12-03 RX ORDER — SEMAGLUTIDE 2.4 MG/.75ML
2.4 INJECTION, SOLUTION SUBCUTANEOUS
Qty: 3 ML | Refills: 0 | Status: CANCELLED | OUTPATIENT
Start: 2024-12-03

## 2024-12-03 NOTE — TELEPHONE ENCOUNTER
No care due was identified.  [facility] Embedded Care Due Messages. Reference number: [id number].   12/03/2024 2:03:59 PM CST

## 2024-12-06 DIAGNOSIS — E66.01 CLASS 2 SEVERE OBESITY DUE TO EXCESS CALORIES WITH SERIOUS COMORBIDITY AND BODY MASS INDEX (BMI) OF 38.0 TO 38.9 IN ADULT: ICD-10-CM

## 2024-12-06 DIAGNOSIS — E66.812 CLASS 2 SEVERE OBESITY DUE TO EXCESS CALORIES WITH SERIOUS COMORBIDITY AND BODY MASS INDEX (BMI) OF 38.0 TO 38.9 IN ADULT: ICD-10-CM

## 2024-12-06 NOTE — TELEPHONE ENCOUNTER
----- Message from Truzip sent at 12/6/2024  3:34 PM CST -----  Who Called:self      Refill or New Rx:refill      RX Name and Strength:semaglutide, weight loss, (WEGOVY) 2.4 mg/0.75 mL PnIj 3 mL      Is this a 30 day or 90 day RX:      Preferred Pharmacy with phone number: [facility] [place]      Would the patient rather a call back or a response via [facility]?call      Best Call Back Number:.[phone]      Additional Information: pt would also like to schedule a sooner apt next availability is in MAR

## 2024-12-06 NOTE — TELEPHONE ENCOUNTER
Care Due:                  Date            Visit Type   Department     Provider  --------------------------------------------------------------------------------                                ESTABLISHED   [facility]                              PATIENT -    MED/ INTERNAL  Last Visit: 06-      VIRTUAL      MED/ PEDS      Elizabeth Byrnes  Next Visit: None Scheduled  None         None Found                                                            Last  Test          Frequency    Reason                     Performed    Due Date  --------------------------------------------------------------------------------    HBA1C.......  6 months...  semaglutide,.............  06- 12-    [facility] Embedded Care Due Messages. Reference number: [id number].   12/06/2024 4:24:17 PM CST

## 2024-12-09 ENCOUNTER — TELEPHONE (OUTPATIENT)
Dept: FAMILY MEDICINE | Facility: CLINIC | Age: 64
End: 2024-12-09
Payer: COMMERCIAL

## 2024-12-09 RX ORDER — SEMAGLUTIDE 2.4 MG/.75ML
2.4 INJECTION, SOLUTION SUBCUTANEOUS
Qty: 3 ML | Refills: 0 | OUTPATIENT
Start: 2024-12-09

## 2024-12-09 NOTE — TELEPHONE ENCOUNTER
----- Message from Aaliyah sent at 12/9/2024  1:34 PM CST -----  Regarding: call back  Type: Patient Call Back    Who called: pt     What is the request in detail: requesting call to see if staff can fit her in for an rx refill appt prior to next available, wants to be seen this month if possible     Can the clinic reply by [facility]?no    Would the patient rather a call back or a response via [facility]? call    Best call back number: [phone]     Additional Information:

## 2024-12-10 NOTE — TELEPHONE ENCOUNTER
Spoke with patient scheduled for Thursday with PCP for med refill, patient verbalized acceptance.

## 2024-12-12 ENCOUNTER — OFFICE VISIT (OUTPATIENT)
Dept: FAMILY MEDICINE | Facility: CLINIC | Age: 64
End: 2024-12-12
Payer: COMMERCIAL

## 2024-12-12 VITALS
BODY MASS INDEX: 36.6 KG/M2 | WEIGHT: 198.88 LBS | SYSTOLIC BLOOD PRESSURE: 130 MMHG | HEART RATE: 88 BPM | OXYGEN SATURATION: 95 % | HEIGHT: 62 IN | DIASTOLIC BLOOD PRESSURE: 80 MMHG | TEMPERATURE: 99 F

## 2024-12-12 DIAGNOSIS — I10 ESSENTIAL HYPERTENSION: ICD-10-CM

## 2024-12-12 DIAGNOSIS — E66.01 CLASS 2 SEVERE OBESITY DUE TO EXCESS CALORIES WITH SERIOUS COMORBIDITY AND BODY MASS INDEX (BMI) OF 38.0 TO 38.9 IN ADULT: ICD-10-CM

## 2024-12-12 DIAGNOSIS — E66.812 CLASS 2 SEVERE OBESITY DUE TO EXCESS CALORIES WITH SERIOUS COMORBIDITY AND BODY MASS INDEX (BMI) OF 38.0 TO 38.9 IN ADULT: ICD-10-CM

## 2024-12-12 DIAGNOSIS — Z12.31 SCREENING MAMMOGRAM FOR BREAST CANCER: Primary | ICD-10-CM

## 2024-12-12 DIAGNOSIS — Z00.00 GENERAL MEDICAL EXAM: Primary | ICD-10-CM

## 2024-12-12 PROCEDURE — 99999 PR PBB SHADOW E&M-EST. PATIENT-LVL IV: CPT | Mod: PBBFAC,,, | Performed by: FAMILY MEDICINE

## 2024-12-12 RX ORDER — CYCLOBENZAPRINE HCL 10 MG
TABLET ORAL
COMMUNITY
Start: 2024-11-15

## 2024-12-12 RX ORDER — SEMAGLUTIDE 2.4 MG/.75ML
2.4 INJECTION, SOLUTION SUBCUTANEOUS
Qty: 3 ML | Refills: 3 | Status: SHIPPED | OUTPATIENT
Start: 2024-12-12

## 2024-12-12 NOTE — PROGRESS NOTES
Routine Office Visit     Patient Name: Makenna Candelaria    : 1960  MRN: [id number]    Subjective     History of Present Illness    CHIEF COMPLAINT:  Patient presents today for follow-up and medication management.    WEIGHT MANAGEMENT:  She reports significant weight loss progress, having lost over 20 lbs since her last visit. Her current weight is 198 lbs. She expresses a desire to continue losing weight, with a goal weight of 150 lbs, acknowledging that this goal may be challenging to achieve. She is currently on weight loss medication at a dosage of 2.4 mg, administered as one injection per week.    2022 - 2022 - 200  3/23/2023 - 209  2024 - 210  2024 - 225  2024 -      Weight goal - 180 (changed 3/23/2023)     DIET AND EXERCISE:  She reports eating mostly turkey and small portions of other foods on . She plans to restart her exercise routine, which includes climbing five flights of stairs up and down at work.    PREVENTIVE CARE:  She is overdue for mammogram, with the last one performed in . She is also overdue for a pap smear and does not currently have an OB-GYN provider. She received her flu vaccine in October.    FAMILY HEALTH:  She reports her daughter has the flu and has not received the flu vaccine this season.      ROS:  General: no fever, no chills, no fatigue, no weight gain, +weight loss  Eyes: no vision changes, no redness, no discharge  ENT: no ear pain, no nasal congestion, no sore throat  Cardiovascular: no chest pain, no palpitations, no lower extremity edema  Respiratory: no cough, no shortness of breath  Gastrointestinal: no abdominal pain, no nausea, no vomiting, no diarrhea, no constipation, no blood in stool  Genitourinary: no dysuria, no hematuria, no frequency  Musculoskeletal: no joint pain, no muscle pain  Skin: no rash, no lesion  Neurological: no headache, no dizziness, no numbness, no tingling  Psychiatric: no anxiety, no depression,  "no sleep difficulty             Objective     /80   Pulse 88   Temp 98.9 °F (37.2 °C) (Oral)   Ht 5' 2" (1.575 m)   Wt 90.2 kg (198 lb 13.7 oz)   SpO2 95%   BMI 36.37 kg/m²   Physical Exam  Constitutional:       Appearance: She is well-developed.   HENT:      Head: Normocephalic and atraumatic.   Eyes:      Conjunctiva/sclera: Conjunctivae normal.      Pupils: Pupils are equal, round, and reactive to light.   Cardiovascular:      Rate and Rhythm: Normal rate and regular rhythm.      Heart sounds: Normal heart sounds. No murmur heard.     No friction rub. No gallop.   Pulmonary:      Effort: No respiratory distress.      Breath sounds: Normal breath sounds.   Abdominal:      General: Bowel sounds are normal. There is no distension.      Palpations: Abdomen is soft.      Tenderness: There is no abdominal tenderness.   Musculoskeletal:         General: Normal range of motion.      Cervical back: Normal range of motion and neck supple.   Lymphadenopathy:      Cervical: No cervical adenopathy.   Skin:     General: Skin is warm.   Neurological:      Mental Status: She is alert and oriented to person, place, and time.           Assessment     Assessment & Plan          FOLLOW-UP CARE:   Deferred labs (thyroid, cholesterol, liver, kidney, anemia) to next visit.   Follow up in 4 months.   Follow up for labs at next visit.         Problem List Items Addressed This Visit          Cardiac/Vascular    Essential hypertension       Endocrine    Class 2 severe obesity due to excess calories with serious comorbidity and body mass index (BMI) of 38.0 to 38.9 in adult    Relevant Medications    semaglutide, weight loss, (WEGOVY) 2.4 mg/0.75 mL PnIj   Explained normal weight fluctuations due to fluid intake and seasonal clothing changes.   Discussed how the medication slows down gut motility, potentially causing nausea, bloating, and abdominal pain, especially with processed foods.   Advised on potential discomfort from " heavy, buttery, oily, and greasy foods during holidays due to medication's effects.   Patient to monitor portion sizes during holiday meals, especially for processed and heavy foods.   Patient to be cautious with holiday foods to avoid feeling sick and maintain weight loss progress.   Patient to resume stair climbing exercise at work (5 flights up and down).   Assessed patient's significant weight loss of 20+ lbs since last visit   Considered patient's weight goal of 150 lbs as potentially unrealistic; set interim goal of 180 lbs   Evaluated current weight of 198 lbs as improving towards goal   Determined labs can be deferred to next visit, as recent labs were done in October       Other Visit Diagnoses       General medical exam    -  Primary    Relevant Orders    CBC Auto Differential    Comprehensive Metabolic Panel    Lipid Panel    Hemoglobin A1C    TSH    Ambulatory referral/consult to Obstetrics / Gynecology              This note was generated with the assistance of ambient listening technology. Verbal consent was obtained by the patient and accompanying visitor(s) for the recording of patient appointment to facilitate this note. I attest to having reviewed and edited the generated note for accuracy, though some syntax or spelling errors may persist. Please contact the author of this note for any clarification.

## 2025-01-08 NOTE — PROGRESS NOTES
"HISTORY OF PRESENT ILLNESS:    Makenna Candelaria is a 65 y.o. female , presents for a routine exam.  Reports GYN complaints. She denies vaginal bleeding, vasomotor symptoms, vaginal dryness.  She does not want STD screening.    She reports intermittently LLQ pain for the last few months described as throbbing, ache that lasts a few days at a time. Nothing noted makes pain better or worse. She does note yellow/white vaginal discharge, constipation, urinary frequency (on "fluid pills").   The patient participates in regular exercise: Yes.  The patient does not smoke.  The patient wears seatbelts.   Pt denies any domestic violence.  No tattoos or blood transfusions.     SCREENING HISTORY:  PAP:  NILM  MAMMOGRAM:    COLONOSCOPY: , DUE    Dexa: n/a     Gyn FH:  Breast cancer: MOM, MAUNT X 2  Colon cancer: none  Ovarian cancer: none  Endometrial cancer: none    Past Medical History:   Diagnosis Date    Breast cancer     Chronic headaches     Hypertension        Past Surgical History:   Procedure Laterality Date    BREAST BIOPSY      BREAST LUMPECTOMY      BUNIONECTOMY       SECTION          MEDICATIONS AND ALLERGIES:      Current Outpatient Medications:     cyclobenzaprine (FLEXERIL) 10 MG tablet, SMARTSI.0 Tablet(s) By Mouth 3 Times Daily, Disp: , Rfl:     EScitalopram oxalate (LEXAPRO) 20 MG tablet, TAKE 1 TABLET(20 MG) BY MOUTH EVERY DAY, Disp: 90 tablet, Rfl: 3    fluticasone propionate (FLONASE) 50 mcg/actuation nasal spray, 2 sprays (100 mcg total) by Each Nostril route daily as needed (nasal congestion)., Disp: 15.8 mL, Rfl: 0    fremanezumab-vfrm (AJOVY SYRINGE) 225 mg/1.5 mL injection, INJECT EVERY 28 DAYS., Disp: 1.5 mL, Rfl: 3    hydroCHLOROthiazide (HYDRODIURIL) 25 MG tablet, Take 1 tablet (25 mg total) by mouth once daily., Disp: 90 tablet, Rfl: 0    losartan (COZAAR) 100 MG tablet, TAKE 1 TABLET(100 MG) BY MOUTH EVERY DAY, Disp: 90 tablet, Rfl: 0    meloxicam (MOBIC) 15 MG " tablet, Take 15 mg by mouth every morning., Disp: , Rfl:     omeprazole (PRILOSEC) 20 MG capsule, TAKE 1 CAPSULE BY MOUTH DAILY AS NEEDED, Disp: 90 capsule, Rfl: 1    ondansetron (ZOFRAN-ODT) 4 MG TbDL, Take by mouth., Disp: , Rfl:     QULIPTA 60 mg Tab, Take 1 tablet by mouth once daily., Disp: , Rfl:     semaglutide, weight loss, (WEGOVY) 2.4 mg/0.75 mL PnIj, Inject 2.4 mg (one pen) into the skin every 7 days., Disp: 3 mL, Rfl: 3    UBRELVY 100 mg tablet, Take by mouth., Disp: , Rfl:     Review of patient's allergies indicates:   Allergen Reactions    Sulfa (sulfonamide antibiotics)        Family History   Problem Relation Name Age of Onset    Breast cancer Mother      Breast cancer Maternal Aunt      Breast cancer Maternal Aunt      Ovarian cancer Neg Hx      Colon cancer Neg Hx      Uterine cancer Neg Hx         Social History     Socioeconomic History    Marital status: Single   Tobacco Use    Smoking status: Never    Smokeless tobacco: Never   Substance and Sexual Activity    Alcohol use: Not Currently    Drug use: Never    Sexual activity: Not Currently     Partners: Male     Social Drivers of Health     Financial Resource Strain: Low Risk  (6/21/2024)    Overall Financial Resource Strain (CARDIA)     Difficulty of Paying Living Expenses: Not very hard   Food Insecurity: No Food Insecurity (6/21/2024)    Hunger Vital Sign     Worried About Running Out of Food in the Last Year: Never true     Ran Out of Food in the Last Year: Never true   Physical Activity: Insufficiently Active (6/21/2024)    Exercise Vital Sign     Days of Exercise per Week: 5 days     Minutes of Exercise per Session: 20 min   Stress: No Stress Concern Present (6/21/2024)    [employer] [place] [employer] - Occupational Stress Questionnaire     Feeling of Stress : Not at all   Housing Stability: Unknown (6/21/2024)    Housing Stability Vital Sign     Unable to Pay for Housing in the Last Year: No       COMPREHENSIVE GYN HISTORY:     PAP History:  Denies abnormal Paps   Infection History: Denies STDs. Denies PID.  Benign History: Denies uterine fibroids. Denies ovarian cysts. Denies endometriosis. Denies other conditions.  Cancer History: Denies cervical cancer. Denies uterine cancer or hyperplasia. Denies ovarian cancer. Denies vulvar cancer or pre-cancer. Denies vaginal cancer or pre-cancer. + breast cancer. Denies colon cancer.    ROS:  GENERAL: No weight changes. No swelling. No fatigue. No fever.  CARDIOVASCULAR: No chest pain. No shortness of breath. No leg cramps.   NEUROLOGICAL: No headaches. No vision changes.  BREASTS: No pain. No lumps. No discharge.  ABDOMEN: No pain. No nausea. No vomiting. No diarrhea. No constipation.  REPRODUCTIVE: NO  abnormal bleeding.   VULVA: No pain. No lesions. No itching.   VAGINA: No relaxation. No itching. No odor. No lesions. + DISCHARGE  URINARY: No incontinence. No nocturia. No frequency. No dysuria.    /84   Wt 92 kg (202 lb 13.2 oz)   BMI 37.10 kg/m²     PE:  APPEARANCE: Well nourished, well developed, in no acute distress.  AFFECT: WNL, alert and oriented x 3.  SKIN: No hirsutism or acne.  NECK: Neck symmetric without masses or thyromegaly.  NODES: No inguinal, cervical, axillary or femoral lymph node enlargement.  CHEST: Good respiratory effort.   ABDOMEN: Soft. No tenderness or masses. No hepatosplenomegaly. No hernias.  BREASTS: Symmetrical, no skin changes or visible lesions. No palpable masses, nipple discharge bilaterally.  PELVIC: ATROPHIC EXTERNAL FEMALE GENITALIA without lesions. Normal hair distribution. Adequate perineal body, normal urethral meatus. VAGINA DRY without lesions +YELLOW discharge. CERVIX STENOTIC without lesions, discharge or tenderness. No significant cystocele or rectocele. Bimanual exam shows uterus to be normal size, regular, mobile and nontender. Adnexa without masses or tenderness.  EXTREMITIES: No edema.    DIAGNOSIS:  1. Women's annual routine  gynecological examination        2. Screening breast examination        3. Encounter for Papanicolaou smear for cervical cancer screening  Liquid-Based Pap Smear, Screening      4. Screening for human papillomavirus (HPV)  HPV High Risk Genotypes, PCR      5. Encounter for screening mammogram for breast cancer        6. Colon cancer screening  Ambulatory referral/consult to Endo Procedure       7. General medical exam  Ambulatory referral/consult to Obstetrics / Gynecology      8. Flank pain  POCT urine dipstick without microscope    Urine culture      9. Essential hypertension        10. Elevated cholesterol        11. History of breast cancer        12. Blood in stool  Ambulatory referral/consult to Gastroenterology      13. LLQ pain  Vaginosis Screen by DNA Probe    US Pelvis Comp with Transvag NON-OB (xpd      14. Constipation, unspecified constipation type  Ambulatory referral/consult to Gastroenterology    Ambulatory referral/consult to Endo Procedure             PLAN:  - FOLLOW UP WITH PCP, DISCUSS BRCA TESTING DUE TO FHX   - GI REFERRAL - COLONOSCOPY DUE, + CONSTIPATION, BLOOD IN STOOL PER PT.   - Pap and HPV done today.  - Screening tests as ordered.  - Diet and exercise encouraged.  Seat belt use encouraged.  Reviewed ASCCP Pap guidelines and screening recommendations.    Counseling: indications for and frequency of periodic gynecologic exam    Counseling on osteoporosis prevention, calcium supplementation, and regular weight bearing exercise listed in AVS.     The patient was also counseled today on ACS PAP guidelines, with recommendations for yearly pelvic exams unless their uterus, cervix, and ovaries were removed for benign reasons; in that case, examinations every 3-5 years are recommended.  The patient was also counseled regarding monthly breast self-examination, routine STD screening for at-risk populations, prophylactic immunizations for transmitted infections such as  HPV,  Pertussis, or Influenza as appropriate, and yearly mammograms when indicated by ACS guidelines.  She was advised to see her primary care physician for all other health maintenance.    FOLLOW-UP with me annually.   Demetria Vela PA-C

## 2025-01-13 ENCOUNTER — OFFICE VISIT (OUTPATIENT)
Dept: OBSTETRICS AND GYNECOLOGY | Facility: CLINIC | Age: 65
End: 2025-01-13
Payer: COMMERCIAL

## 2025-01-13 VITALS — SYSTOLIC BLOOD PRESSURE: 130 MMHG | WEIGHT: 202.81 LBS | BODY MASS INDEX: 37.1 KG/M2 | DIASTOLIC BLOOD PRESSURE: 84 MMHG

## 2025-01-13 DIAGNOSIS — K59.00 CONSTIPATION, UNSPECIFIED CONSTIPATION TYPE: ICD-10-CM

## 2025-01-13 DIAGNOSIS — Z85.3 HISTORY OF BREAST CANCER: ICD-10-CM

## 2025-01-13 DIAGNOSIS — R10.9 FLANK PAIN: ICD-10-CM

## 2025-01-13 DIAGNOSIS — K92.1 BLOOD IN STOOL: ICD-10-CM

## 2025-01-13 DIAGNOSIS — Z12.4 ENCOUNTER FOR PAPANICOLAOU SMEAR FOR CERVICAL CANCER SCREENING: ICD-10-CM

## 2025-01-13 DIAGNOSIS — Z00.00 GENERAL MEDICAL EXAM: ICD-10-CM

## 2025-01-13 DIAGNOSIS — Z12.11 COLON CANCER SCREENING: ICD-10-CM

## 2025-01-13 DIAGNOSIS — Z11.51 SCREENING FOR HUMAN PAPILLOMAVIRUS (HPV): ICD-10-CM

## 2025-01-13 DIAGNOSIS — Z01.419 WOMEN'S ANNUAL ROUTINE GYNECOLOGICAL EXAMINATION: Primary | ICD-10-CM

## 2025-01-13 DIAGNOSIS — R10.32 LLQ PAIN: ICD-10-CM

## 2025-01-13 DIAGNOSIS — I10 ESSENTIAL HYPERTENSION: ICD-10-CM

## 2025-01-13 DIAGNOSIS — Z12.31 ENCOUNTER FOR SCREENING MAMMOGRAM FOR BREAST CANCER: ICD-10-CM

## 2025-01-13 DIAGNOSIS — Z12.39 SCREENING BREAST EXAMINATION: ICD-10-CM

## 2025-01-13 DIAGNOSIS — E78.00 ELEVATED CHOLESTEROL: ICD-10-CM

## 2025-01-13 LAB
BILIRUB SERPL-MCNC: ABNORMAL MG/DL
BLOOD URINE, POC: 1
CLARITY, POC UA: ABNORMAL
COLOR, POC UA: YELLOW
GLUCOSE UR QL STRIP: ABNORMAL
KETONES UR QL STRIP: ABNORMAL
LEUKOCYTE ESTERASE URINE, POC: ABNORMAL
NITRITE, POC UA: ABNORMAL
PH, POC UA: 7
PROTEIN, POC: ABNORMAL
SPECIFIC GRAVITY, POC UA: 1020
UROBILINOGEN, POC UA: ABNORMAL

## 2025-01-13 PROCEDURE — 88175 CYTOPATH C/V AUTO FLUID REDO: CPT | Performed by: PHYSICIAN ASSISTANT

## 2025-01-13 PROCEDURE — 81002 URINALYSIS NONAUTO W/O SCOPE: CPT | Mod: S$GLB,,, | Performed by: PHYSICIAN ASSISTANT

## 2025-01-13 PROCEDURE — 3008F BODY MASS INDEX DOCD: CPT | Mod: CPTII,S$GLB,, | Performed by: PHYSICIAN ASSISTANT

## 2025-01-13 PROCEDURE — 3079F DIAST BP 80-89 MM HG: CPT | Mod: CPTII,S$GLB,, | Performed by: PHYSICIAN ASSISTANT

## 2025-01-13 PROCEDURE — 3075F SYST BP GE 130 - 139MM HG: CPT | Mod: CPTII,S$GLB,, | Performed by: PHYSICIAN ASSISTANT

## 2025-01-13 PROCEDURE — 87624 HPV HI-RISK TYP POOLED RSLT: CPT | Performed by: PHYSICIAN ASSISTANT

## 2025-01-13 PROCEDURE — 99386 PREV VISIT NEW AGE 40-64: CPT | Mod: S$GLB,,, | Performed by: PHYSICIAN ASSISTANT

## 2025-01-13 PROCEDURE — 81515 NFCT DS BV&VAGINITIS DNA ALG: CPT | Performed by: PHYSICIAN ASSISTANT

## 2025-01-13 PROCEDURE — 1159F MED LIST DOCD IN RCRD: CPT | Mod: CPTII,S$GLB,, | Performed by: PHYSICIAN ASSISTANT

## 2025-01-13 PROCEDURE — 99999 PR PBB SHADOW E&M-EST. PATIENT-LVL V: CPT | Mod: PBBFAC,,, | Performed by: PHYSICIAN ASSISTANT

## 2025-01-13 PROCEDURE — 87086 URINE CULTURE/COLONY COUNT: CPT | Performed by: PHYSICIAN ASSISTANT

## 2025-01-15 LAB
BACTERIA UR CULT: NORMAL
BACTERIAL VAGINOSIS DNA: NOT DETECTED
CANDIDA GLABRATA/KRUSEI: NOT DETECTED
CANDIDA RRNA VAG QL PROBE: NOT DETECTED
TRICHOMONAS VAGINALIS: NOT DETECTED

## 2025-01-16 ENCOUNTER — NURSE TRIAGE (OUTPATIENT)
Dept: ADMINISTRATIVE | Facility: CLINIC | Age: 65
End: 2025-01-16
Payer: COMMERCIAL

## 2025-01-16 ENCOUNTER — OCHSNER VIRTUAL EMERGENCY DEPARTMENT (OUTPATIENT)
Facility: CLINIC | Age: 65
End: 2025-01-16
Payer: COMMERCIAL

## 2025-01-16 ENCOUNTER — HOSPITAL ENCOUNTER (EMERGENCY)
Facility: HOSPITAL | Age: 65
Discharge: HOME | End: 2025-01-16
Attending: INTERNAL MEDICINE
Payer: COMMERCIAL

## 2025-01-16 VITALS
TEMPERATURE: 98 F | WEIGHT: 201 LBS | BODY MASS INDEX: 36.99 KG/M2 | HEIGHT: 62 IN | HEART RATE: 75 BPM | DIASTOLIC BLOOD PRESSURE: 81 MMHG | OXYGEN SATURATION: 100 % | SYSTOLIC BLOOD PRESSURE: 142 MMHG | RESPIRATION RATE: 18 BRPM

## 2025-01-16 DIAGNOSIS — G43.809 OTHER MIGRAINE WITHOUT STATUS MIGRAINOSUS, NOT INTRACTABLE: Primary | ICD-10-CM

## 2025-01-16 DIAGNOSIS — K11.20 SIALADENITIS: ICD-10-CM

## 2025-01-16 LAB
ALBUMIN SERPL-MCNC: 4 G/DL (ref 3.3–5.5)
ALP SERPL-CCNC: 78 U/L (ref 42–141)
BILIRUB SERPL-MCNC: 0.9 MG/DL (ref 0.2–1.6)
BUN SERPL-MCNC: 9 MG/DL (ref 7–22)
CALCIUM SERPL-MCNC: 9.9 MG/DL (ref 8–10.3)
CHLORIDE SERPL-SCNC: 97 MMOL/L (ref 98–108)
CREAT SERPL-MCNC: 0.9 MG/DL (ref 0.6–1.2)
GLUCOSE SERPL-MCNC: 92 MG/DL (ref 73–118)
HCT, POC: NORMAL
HGB, POC: NORMAL (ref 14–18)
MCH, POC: NORMAL
MCHC, POC: NORMAL
MCV, POC: NORMAL
MPV, POC: NORMAL
POC ALT (SGPT): 15 U/L (ref 10–47)
POC AST (SGOT): 16 U/L (ref 11–38)
POC PLATELET COUNT: NORMAL
POC TCO2: 31 MMOL/L (ref 18–33)
POTASSIUM BLD-SCNC: 3.7 MMOL/L (ref 3.6–5.1)
PROTEIN, POC: 8.6 G/DL (ref 6.4–8.1)
RBC, POC: NORMAL
RDW, POC: NORMAL
SODIUM BLD-SCNC: 141 MMOL/L (ref 128–145)
WBC, POC: NORMAL

## 2025-01-16 PROCEDURE — 80053 COMPREHEN METABOLIC PANEL: CPT | Mod: ER

## 2025-01-16 PROCEDURE — 25000003 PHARM REV CODE 250: Mod: ER | Performed by: NURSE PRACTITIONER

## 2025-01-16 PROCEDURE — 63600175 PHARM REV CODE 636 W HCPCS: Mod: TB,ER | Performed by: NURSE PRACTITIONER

## 2025-01-16 PROCEDURE — 96374 THER/PROPH/DIAG INJ IV PUSH: CPT | Mod: ER

## 2025-01-16 PROCEDURE — 85025 COMPLETE CBC W/AUTO DIFF WBC: CPT | Mod: ER

## 2025-01-16 PROCEDURE — 25500020 PHARM REV CODE 255: Mod: ER | Performed by: INTERNAL MEDICINE

## 2025-01-16 PROCEDURE — 96361 HYDRATE IV INFUSION ADD-ON: CPT | Mod: ER

## 2025-01-16 PROCEDURE — 99285 EMERGENCY DEPT VISIT HI MDM: CPT | Mod: 25,ER

## 2025-01-16 PROCEDURE — 96375 TX/PRO/DX INJ NEW DRUG ADDON: CPT | Mod: ER

## 2025-01-16 RX ORDER — DIPHENHYDRAMINE HYDROCHLORIDE 50 MG/ML
50 INJECTION INTRAMUSCULAR; INTRAVENOUS
Status: COMPLETED | OUTPATIENT
Start: 2025-01-16 | End: 2025-01-16

## 2025-01-16 RX ORDER — BUTALBITAL, ACETAMINOPHEN AND CAFFEINE 50; 325; 40 MG/1; MG/1; MG/1
1 TABLET ORAL EVERY 6 HOURS PRN
Qty: 12 TABLET | Refills: 0 | Status: SHIPPED | OUTPATIENT
Start: 2025-01-16

## 2025-01-16 RX ORDER — KETOROLAC TROMETHAMINE 30 MG/ML
15 INJECTION, SOLUTION INTRAMUSCULAR; INTRAVENOUS
Status: COMPLETED | OUTPATIENT
Start: 2025-01-16 | End: 2025-01-16

## 2025-01-16 RX ORDER — METOCLOPRAMIDE HYDROCHLORIDE 5 MG/ML
10 INJECTION INTRAMUSCULAR; INTRAVENOUS
Status: COMPLETED | OUTPATIENT
Start: 2025-01-16 | End: 2025-01-16

## 2025-01-16 RX ADMIN — DIPHENHYDRAMINE HYDROCHLORIDE 50 MG: 50 INJECTION INTRAMUSCULAR; INTRAVENOUS at 08:01

## 2025-01-16 RX ADMIN — IOHEXOL 75 ML: 350 INJECTION, SOLUTION INTRAVENOUS at 09:01

## 2025-01-16 RX ADMIN — METOCLOPRAMIDE 10 MG: 5 INJECTION, SOLUTION INTRAMUSCULAR; INTRAVENOUS at 08:01

## 2025-01-16 RX ADMIN — SODIUM CHLORIDE 1000 ML: 9 INJECTION, SOLUTION INTRAVENOUS at 08:01

## 2025-01-16 RX ADMIN — KETOROLAC TROMETHAMINE 15 MG: 30 INJECTION, SOLUTION INTRAMUSCULAR at 08:01

## 2025-01-16 NOTE — PLAN OF CARE-OVED
[facility] Plan of Care Note    Referral source: Nurse On-Call      Reason for consult: Cough      Additional History: c/o swollen glands on both sides of neck, sensitive to touch. Noticed yesterday morning. Feels bigger today. Some difficulty swallowing foods for a while now. Able to swallow fluids easily. Denies fever, SOB.       Disposition recommended: Urgent Care      Additional Recommendations: no

## 2025-01-16 NOTE — TELEPHONE ENCOUNTER
Makenna c/o swollen glands on both sides of neck, sensitive to touch. Noticed yesterday morning. Feels bigger today. Some difficulty swallowing foods for a while now. Able to swallow fluids easily. Denies fever, SOB. Per triage protocol, go to office now. No available appts with PCP. Advised pt per Dr. Kayode Chen, [facility] ok to schedule PCP appt for tomorrow if available. If pt not wanting to wait until tomorrow, recommends pt go to nearby  today for physician dani Mensah v/u.       Reason for Disposition   Single large node and size > 1 inch (2.5 cm)    Additional Information   Negative: Sounds like a life-threatening emergency to the triager   Negative: Sore throat is main symptom and has swollen node in the neck that is < 1 inch (2.5 cm) in size   Negative: Node is in the neck and causes difficulty breathing   Negative: Patient sounds very sick or weak to the triager   Negative: Node is in the neck and can't swallow fluids   Negative: Fever > 103 F (39.4 C)   Negative: Lump or swelling in groin and pulsating (like heartbeat)    Protocols used: Lymph Nodes - [id number]

## 2025-01-17 NOTE — ED PROVIDER NOTES
Encounter Date: 2025       History     Chief Complaint   Patient presents with    Neck Pain    Cough     A 66 y/o female presents to the ER c/o swelling to glands in throat x 2 days. Denies difficulty swallowing. +Cough. Unable to get an appointment with PCP.      Chief complaint:  Neck pain     History of present illness: Patient is a 65-year-old female who reports for the last 2 days she has noticed swelling of 2 discrete areas under her chin.  She reports the pain is worsened she has decreased appetite headache and cough.  She has taken no medications for this.  She believes the headache is a migraine which she does suffer from for which he takes Qlypta.  She denies fevers sore throat wheezing productive of sputum shortness of breath diarrhea nausea vomiting or constipation.    The history is provided by the patient. No  was used.     Review of patient's allergies indicates:   Allergen Reactions    Sulfa (sulfonamide antibiotics)      Past Medical History:   Diagnosis Date    Breast cancer     Chronic headaches     Hypertension      Past Surgical History:   Procedure Laterality Date    BREAST BIOPSY      BREAST LUMPECTOMY      BUNIONECTOMY       SECTION       Family History   Problem Relation Name Age of Onset    Breast cancer Mother      Breast cancer Maternal Aunt      Breast cancer Maternal Aunt      Ovarian cancer Neg Hx      Colon cancer Neg Hx      Uterine cancer Neg Hx       Social History     Tobacco Use    Smoking status: Never    Smokeless tobacco: Never   Substance Use Topics    Alcohol use: Not Currently    Drug use: Never     Review of Systems   Constitutional:  Positive for appetite change.   HENT:          Swollen areas under chin   Respiratory:  Positive for cough.    Neurological:  Positive for headaches.       Physical Exam     Initial Vitals [25 1709]   BP Pulse Resp Temp SpO2   (!) 168/98 84 18 98.2 °F (36.8 °C) 98 %      MAP       --         Physical  Exam    Nursing note and vitals reviewed.  Constitutional: She appears well-developed and well-nourished.   HENT:   Head: Normocephalic and atraumatic.   Right Ear: Hearing, tympanic membrane, external ear and ear canal normal.   Left Ear: Hearing, tympanic membrane, external ear and ear canal normal.   Nose: Nose normal. No mucosal edema or rhinorrhea. No epistaxis. Right sinus exhibits no maxillary sinus tenderness and no frontal sinus tenderness. Left sinus exhibits no maxillary sinus tenderness and no frontal sinus tenderness. Mouth/Throat: Uvula is midline, oropharynx is clear and moist and mucous membranes are normal. No oral lesions. Normal dentition.   Swelling of bilateral submandibular glands.  No redness or warmth.  No exudate from Latasha's or Stensen ducts.   Eyes: Conjunctivae and EOM are normal. Pupils are equal, round, and reactive to light. Right eye exhibits no discharge. Left eye exhibits no discharge.   Neck:   Normal range of motion.  Cardiovascular:  Regular rhythm, S1 normal, S2 normal and normal heart sounds.     Exam reveals no gallop.       No murmur heard.  Pulmonary/Chest: Effort normal and breath sounds normal. No respiratory distress. She has no decreased breath sounds. She has no wheezes. She has no rhonchi. She has no rales.   Abdominal: She exhibits no distension.   Musculoskeletal:         General: Normal range of motion.      Cervical back: Normal range of motion.     Neurological: She is alert and oriented to person, place, and time.   Skin: Skin is dry. Capillary refill takes less than 2 seconds.         ED Course   Procedures  Labs Reviewed   POCT CMP - Abnormal       Result Value    Albumin, POC 4.0      Alkaline Phosphatase, POC 78      ALT (SGPT), POC 15      AST (SGOT), POC 16      POC BUN 9      Calcium, POC 9.9      POC Chloride 97 (*)     POC Creatinine 0.9      POC Glucose 92      POC Potassium 3.7      POC Sodium 141      Bilirubin, POC 0.9      POC TCO2 31       Protein, POC 8.6 (*)    POCT CBC    Hematocrit        Hemoglobin        RBC        WBC        MCV        MCH, POC        MCHC        RDW-CV        Platelet Count, POC        MPV       POCT CMP          Imaging Results              CT Soft Tissue Neck With Contrast (Final result)  Result time 01/16/25 21:40:00      Final result by Arian Harris MD (01/16/25 21:40:00)                   Impression:      Mild indistinctness of the borders of the left submandibular gland and surrounding increased haze density in the surrounding fat suggesting left submandibular inflammation.  No evidence of submandibular gland ductal dilatation or stone.    Normal parotid glands.    Mild cervical adenitis.  Correlate for reactive adenitis.    Degenerative cervical and TMJ changes.      Electronically signed by: Arian Harris  Date:    01/16/2025  Time:    21:40               Narrative:    EXAMINATION:  CT SOFT TISSUE NECK WITH CONTRAST    CLINICAL HISTORY:  Neck mass, nonpulsatile;    TECHNIQUE:  Low dose axial images as well as sagittal and coronal reconstructions were performed from the skull base to the clavicles following the intravenous administration of 75 mL of Omnipaque 350.    COMPARISON:  None    FINDINGS:  The right left parotid glands appear normal with no enlargement or inflammation.  No mass.  Stensen's ducts unremarkable.    The submandibular glands are not enlarged.  There is mild increased density surrounding the left submandibular gland but without evidence of salad adenitis or ductal dilatation or plunging ranula.    Minimal paranasal sinus disease in the maxillary sinuses.  The orbits and orbital contents,  space and muscles of mastication, parapharyngeal space, supra and infrahyoid neck and floor of the mouth and base of the tongue as well as the subglottic airway appear normal.  Intrinsic structures of the larynx unremarkable.  Aryepiglottic folds unremarkable.    Shoddy level 3 and level 2  jugular digastric adenopathy likely reactive.  A solitary cystic appearing structure in the right lobe of the thyroid gland.  Cervical spondylosis.  Degenerative changes in both mandibular condyles and TMJ.                                       Medications   metoclopramide injection 10 mg (10 mg Intravenous Given 1/16/25 2053)   diphenhydrAMINE injection 50 mg (50 mg Intravenous Given 1/16/25 2048)   ketorolac injection 15 mg (15 mg Intravenous Given 1/16/25 2045)   sodium chloride 0.9% bolus 1,000 mL 1,000 mL (0 mLs Intravenous Stopped 1/16/25 2201)   iohexoL (OMNIPAQUE 350) injection 100 mL (75 mLs Intravenous Given 1/16/25 2116)     Medical Decision Making  Patient is a 65-year-old female who reports for the last 2 days she has noticed swelling of 2 discrete areas under her chin.  She reports the pain is worsened she has decreased appetite headache and cough.  She has taken no medications for this.  She believes the headache is a migraine which she does suffer from for which he takes Qlypta.  She denies fevers sore throat wheezing productive of sputum shortness of breath diarrhea nausea vomiting or constipation.    On physical exam the patient is neurologically intact.  Cranial nerves and extremities without abnormality.  Bilateral submandibular glands are swollen mildly tender no redness or warmth is noted.  There is no exudate from the Kell's or Stensen ducts.      Differential diagnosis includes sialadenitis parotitis migraine cluster or tension headache.    Problems Addressed:  Other migraine without status migrainosus, not intractable: acute illness or injury     Details: Patient reported near complete resolution with the medications provided in the ER.  Fioricet prescribed with drowsy warning.  Sialadenitis: acute illness or injury     Details: Recommended therapies as outlined on handout.  Patient to follow up with ENT.    Amount and/or Complexity of Data Reviewed  Labs: ordered. Decision-making details  "documented in ED Course.  Radiology: ordered. Decision-making details documented in ED Course.  Discussion of management or test interpretation with external provider(s): Vital signs at the time of disposition were:  BP (!) 142/81   Pulse 75   Temp 98.2 °F (36.8 °C) (Oral)   Resp 18   Ht 5' 2" (1.575 m)   Wt 91.2 kg (201 lb)   SpO2 100%   BMI 36.76 kg/m²       See AVS for additional recommendations. Medications listed herein were prescribed after reviewing the patient's allergies, medication list, history, most recent laboratories as available.  Referrals below were provided after reviewing the patient's previous medical providers. She understands she  should return for any worsening or changes in condition.  Prior to discharge the patient was asked if she  had any additional concerns or complaints and she declined. The patient was given an opportunity to ask questions and all were answered to her satisfaction.     Risk  OTC drugs.  Prescription drug management.  Diagnosis or treatment significantly limited by social determinants of health.               ED Course as of 01/16/25 2256   Thu Jan 16, 2025 2000 BP(!): 181/92 [VC]   2000 Temp: 98.2 °F (36.8 °C) [VC]   2000 Temp Source: Oral [VC]   2000 Pulse: 80 [VC]   2000 Resp: 18 [VC]   2000 SpO2: 98 % [VC]   2107 POCT CBC  Normal cbc. [VC]   2107 POCT CMP(!)  Essentially normal cmp. [VC]   2144 CT Soft Tissue Neck With Contrast  Mild indistinctness of the borders of the left submandibular gland and surrounding increased haze density in the surrounding fat suggesting left submandibular inflammation.  No evidence of submandibular gland ductal dilatation or stone.     Normal parotid glands.     Mild cervical adenitis.  Correlate for reactive adenitis.     Degenerative cervical and TMJ changes.   [VC]      ED Course User Index  [VC] Dread Jennings DNP                           Clinical Impression:  Final diagnoses:  [G43.809] Other migraine without status " migrainosus, not intractable (Primary)  [K11.20] Sialadenitis          ED Disposition Condition    Discharge Stable          ED Prescriptions       Medication Sig Dispense Start Date End Date Auth. Provider    butalbital-acetaminophen-caffeine -40 mg (FIORICET, ESGIC) -40 mg per tablet Take 1 tablet by mouth every 6 (six) hours as needed. 12 tablet 1/16/2025 -- Dread Jennings DNP          Follow-up Information       Follow up With Specialties Details Why Contact Info    Claudia Milner MD Otolaryngology Schedule an appointment as soon as possible for a visit   [address] [place] [address]  [phone]               Dread Jennings DNP  01/16/25 3490

## 2025-01-19 LAB
FINAL PATHOLOGIC DIAGNOSIS: NORMAL
Lab: NORMAL

## 2025-01-20 LAB
HPV HR 12 DNA SPEC QL NAA+PROBE: NEGATIVE
HPV16 AG SPEC QL: NEGATIVE
HPV18 DNA SPEC QL NAA+PROBE: NEGATIVE

## 2025-01-22 ENCOUNTER — PATIENT MESSAGE (OUTPATIENT)
Dept: ENDOSCOPY | Facility: HOSPITAL | Age: 65
End: 2025-01-22
Payer: COMMERCIAL

## 2025-01-23 ENCOUNTER — TELEPHONE (OUTPATIENT)
Dept: ENDOSCOPY | Facility: HOSPITAL | Age: 65
End: 2025-01-23

## 2025-01-23 ENCOUNTER — CLINICAL SUPPORT (OUTPATIENT)
Dept: ENDOSCOPY | Facility: HOSPITAL | Age: 65
End: 2025-01-23
Payer: COMMERCIAL

## 2025-01-23 DIAGNOSIS — N76.0 BV (BACTERIAL VAGINOSIS): Primary | ICD-10-CM

## 2025-01-23 DIAGNOSIS — K59.00 CONSTIPATION, UNSPECIFIED CONSTIPATION TYPE: ICD-10-CM

## 2025-01-23 DIAGNOSIS — B96.89 BV (BACTERIAL VAGINOSIS): Primary | ICD-10-CM

## 2025-01-23 DIAGNOSIS — Z12.11 COLON CANCER SCREENING: ICD-10-CM

## 2025-01-23 RX ORDER — METRONIDAZOLE 500 MG/1
500 TABLET ORAL 2 TIMES DAILY
Qty: 14 TABLET | Refills: 0 | Status: SHIPPED | OUTPATIENT
Start: 2025-01-23 | End: 2025-01-30

## 2025-01-23 RX ORDER — SODIUM, POTASSIUM,MAG SULFATES 17.5-3.13G
1 SOLUTION, RECONSTITUTED, ORAL ORAL DAILY
Qty: 354 ML | Refills: 0 | Status: SHIPPED | OUTPATIENT
Start: 2025-01-23 | End: 2025-01-25

## 2025-01-23 NOTE — TELEPHONE ENCOUNTER
Referral for procedure from PAT appointment      Spoke to patient to schedule procedure(s) Colonoscopy       Physician to perform procedure(s) Dr. BRIDGET Laurent  Date of Procedure (s) 03/06/25  Arrival Time 9:00 AM  Time of Procedure(s) 10:00 AM   Location of Procedure(s) [address]   Type of Rx Prep sent to patient: Suprep  Instructions provided to patient via [facility]    Patient was informed on the following information and verbalized understanding. Screening questionnaire reviewed with patient and complete. If procedure requires anesthesia, a responsible adult needs to be present to accompany the patient home, patient cannot drive after receiving anesthesia. Appointment details are tentative, especially check-in time. Patient will receive a prep-op call 7 days prior to confirm check-in time for procedure. If applicable the patient should contact their pharmacy to verify Rx for procedure prep is ready for pick-up. Patient was advised to call the scheduling department at [phone] if pharmacy states no Rx is available. Patient was advised to call the endoscopy scheduling department if any questions or concerns arise.       Endoscopy Scheduling Department          If you are taking any injectable medication (s) for weight loss and/or diabetes  weekly, hold for 8 days prior to your scheduled procedure, please stop taking Wegovy (Semaglutide)}on 02/26/25. After the procedure, your provider will inform you  of when to resume injection.

## 2025-02-10 DIAGNOSIS — I10 ESSENTIAL HYPERTENSION: ICD-10-CM

## 2025-02-10 RX ORDER — LOSARTAN POTASSIUM 100 MG/1
100 TABLET ORAL
Qty: 90 TABLET | Refills: 0 | Status: SHIPPED | OUTPATIENT
Start: 2025-02-10

## 2025-02-10 NOTE — TELEPHONE ENCOUNTER
Care Due:                  Date            Visit Type   Department     Provider  --------------------------------------------------------------------------------                                [facility]                              PRIMARY      MED/ INTERNAL  Last Visit: 12-      CARE ([facility])   MED/ PEDS      Elizabeth Byrnes                              [facility]                              PRIMARY      MED/ INTERNAL  Next Visit: 04-      CARE ([facility])   MED/ PEDS      Elizabeth Byrnes                                                            Last  Test          Frequency    Reason                     Performed    Due Date  --------------------------------------------------------------------------------    HBA1C.......  6 months...  semaglutide,.............  06- 12-    [employer] Embedded Care Due Messages. Reference number: [id number].   2/10/2025 9:40:46 AM CST

## 2025-03-05 ENCOUNTER — ANESTHESIA EVENT (OUTPATIENT)
Dept: ENDOSCOPY | Facility: HOSPITAL | Age: 65
End: 2025-03-05
Payer: COMMERCIAL

## 2025-03-05 RX ORDER — LIDOCAINE HYDROCHLORIDE 10 MG/ML
1 INJECTION, SOLUTION EPIDURAL; INFILTRATION; INTRACAUDAL; PERINEURAL ONCE
OUTPATIENT
Start: 2025-03-05 | End: 2025-03-05

## 2025-03-06 ENCOUNTER — ANESTHESIA (OUTPATIENT)
Dept: ENDOSCOPY | Facility: HOSPITAL | Age: 65
End: 2025-03-06
Payer: COMMERCIAL

## 2025-03-06 ENCOUNTER — TELEPHONE (OUTPATIENT)
Dept: ENDOSCOPY | Facility: HOSPITAL | Age: 65
End: 2025-03-06
Payer: COMMERCIAL

## 2025-03-06 DIAGNOSIS — Z12.11 SCREEN FOR COLON CANCER: Primary | ICD-10-CM

## 2025-03-06 RX ORDER — SODIUM, POTASSIUM,MAG SULFATES 17.5-3.13G
1 SOLUTION, RECONSTITUTED, ORAL ORAL DAILY
Qty: 1 KIT | Refills: 0 | Status: SHIPPED | OUTPATIENT
Start: 2025-03-06 | End: 2025-03-08

## 2025-03-06 NOTE — TELEPHONE ENCOUNTER
----- Message from Juliana sent at 3/6/2025  8:48 AM CST -----  Regarding: FW: Self    ----- Message -----  From: Flavia Lopez  Sent: 3/5/2025   1:50 PM CST  To: [facility] Endoscopy Schedulers  Subject: FW: Self                                           ----- Message -----  From: Kris Ornelas  Sent: 3/5/2025   1:46 PM CST  To: [facility] Staff  Subject: Self                                             Type: Patient Call Back What is the request in detail: Pt wants to cancel her sx Can the clinic reply by [facility]? No Would the patient rather a call back or a response via [facility]? Call [facility] call back number: .[phone] Information:Thank you.

## 2025-03-06 NOTE — TELEPHONE ENCOUNTER
Patient rescheduled.  Original referral for procedure from PAT appointment    Spoke to Makenna Candelaria to reschedule Colonoscopy       Physician to perform procedure(s) Dr. BRIDGET Laurent   Date of Procedure (s) 3/13/25  Arrival Time 10:15 AM  Time of Procedure(s) 11:15 AM   Location of Procedure(s) [address]   Type of Rx Prep sent to patient's pharmacy: Suprep  Instructions provided to patient via [facility]  Patient denies use of blood thinners.  Patient is taking Wegovy / Ozempic (Semaglutide), instructed to take last dose on/before 3/5/25.   The following information was discussed with patient, and patient verbalized understanding:  Screening questionnaire reviewed with patient and complete. If procedure requires anesthesia, a responsible adult needs to be present to accompany the patient home. Appointment details are tentative, especially check-in time. Patient will receive a pre-op call 7 days prior to appointment to confirm check-in time for procedure. If applicable the patient should contact their pharmacy to verify Rx for procedure prep is ready for pick-up. Patient was instructed to call the scheduling department at [phone] if pharmacy states no Rx is available. Patient was also advised to call the endoscopy scheduling department if any questions or concerns arise.       Endoscopy Scheduling Department

## 2025-03-12 NOTE — H&P
Short Stay Endoscopy History and Physical    PCP - Elizabeth Byrnes MD    Procedure - Colonoscopy  ASA - per anesthesia  Mallampati - per anesthesia  History of Anesthesia problems - no  Family history Anesthesia problems - no   Plan of anesthesia - General    HPI:  This is a 65 y.o. female here for evaluation of : personal history of colon polyps      ROS:  Constitutional: No fevers, chills, No weight loss  CV: No chest pain  Pulm: No cough, No shortness of breath  GI: see HPI  Derm: No rash    Medical History:  has a past medical history of Breast cancer, Chronic headaches, and Hypertension.    Surgical History:  has a past surgical history that includes Bunionectomy;  section; Breast lumpectomy; and Breast biopsy.    Family History: family history includes Breast cancer in her maternal aunt, maternal aunt, and mother.. Otherwise no colon cancer, inflammatory bowel disease, or GI malignancies.    Social History:  reports that she has never smoked. She has never used smokeless tobacco. She reports that she does not currently use alcohol. She reports that she does not use drugs.    Review of patient's allergies indicates:   Allergen Reactions    Sulfa (sulfonamide antibiotics)        Medications:   Prescriptions Prior to Admission[1]      Physical Exam:    Vital Signs: There were no vitals filed for this visit.    Gen: NAD, lying comfortably  HENT: NCAT, oropharynx clear  Eyes: anicteric sclerae, EOMI grossly  Neck: supple, no visible masses/goiter  Cardiac: RRR  Lungs: non-labored breathing  Abd: soft, NT/ND, normoactive BS  Ext: no LE edema, warm, well perfused  Skin: skin intact on exposed body parts, no visible rashes, lesions  Neuro: A&Ox4, neuro exam grossly intact, moves all extremities  Psych: appropriate mood, affect        Labs:  Lab Results   Component Value Date    WBC 8.63 2022    HGB 14.8 2022    HCT 46.4 2022     2022    CHOL 147 2022    TRIG 103  06/20/2022    HDL 31 (L) 06/20/2022    ALT 12 11/20/2023    AST 11 11/20/2023     11/20/2023    K 4.6 11/20/2023     06/20/2022    CREATININE 0.72 11/20/2023    BUN 10 11/20/2023    CO2 32 11/20/2023    TSH 2.626 06/20/2022    HGBA1C 5.5 11/20/2023       Plan:  Colonoscopy for a history of colon polyps    I have explained the risks and benefits of endoscopy procedures to the patient including but not limited to bleeding, perforation, infection, and death.  The patient was asked if they understand and allowed to ask any further questions to their satisfaction.    Kyra Laurent MD         [1]   No medications prior to admission.

## 2025-03-13 ENCOUNTER — HOSPITAL ENCOUNTER (OUTPATIENT)
Facility: HOSPITAL | Age: 65
Discharge: HOME | End: 2025-03-13
Attending: INTERNAL MEDICINE | Admitting: INTERNAL MEDICINE
Payer: COMMERCIAL

## 2025-03-13 ENCOUNTER — RESULTS FOLLOW-UP (OUTPATIENT)
Dept: FAMILY MEDICINE | Facility: CLINIC | Age: 65
End: 2025-03-13

## 2025-03-13 VITALS
DIASTOLIC BLOOD PRESSURE: 82 MMHG | HEART RATE: 72 BPM | OXYGEN SATURATION: 98 % | TEMPERATURE: 97 F | SYSTOLIC BLOOD PRESSURE: 127 MMHG | RESPIRATION RATE: 18 BRPM

## 2025-03-13 DIAGNOSIS — Z86.0100 HISTORY OF COLON POLYPS: Primary | ICD-10-CM

## 2025-03-13 LAB — POCT GLUCOSE: 93 MG/DL (ref 70–110)

## 2025-03-13 PROCEDURE — 25000003 PHARM REV CODE 250: Performed by: STUDENT IN AN ORGANIZED HEALTH CARE EDUCATION/TRAINING PROGRAM

## 2025-03-13 PROCEDURE — 45385 COLONOSCOPY W/LESION REMOVAL: CPT | Mod: 33 | Performed by: INTERNAL MEDICINE

## 2025-03-13 PROCEDURE — 45380 COLONOSCOPY AND BIOPSY: CPT | Mod: 33,59 | Performed by: INTERNAL MEDICINE

## 2025-03-13 PROCEDURE — 45385 COLONOSCOPY W/LESION REMOVAL: CPT | Mod: 22,33,, | Performed by: INTERNAL MEDICINE

## 2025-03-13 PROCEDURE — 88305 TISSUE EXAM BY PATHOLOGIST: CPT | Mod: 26,,, | Performed by: PATHOLOGY

## 2025-03-13 PROCEDURE — 27201012 HC FORCEPS, HOT/COLD, DISP: Performed by: INTERNAL MEDICINE

## 2025-03-13 PROCEDURE — 63600175 PHARM REV CODE 636 W HCPCS: Performed by: STUDENT IN AN ORGANIZED HEALTH CARE EDUCATION/TRAINING PROGRAM

## 2025-03-13 PROCEDURE — 88305 TISSUE EXAM BY PATHOLOGIST: CPT | Performed by: PATHOLOGY

## 2025-03-13 PROCEDURE — 37000008 HC ANESTHESIA 1ST 15 MINUTES: Performed by: INTERNAL MEDICINE

## 2025-03-13 PROCEDURE — 37000009 HC ANESTHESIA EA ADD 15 MINS: Performed by: INTERNAL MEDICINE

## 2025-03-13 PROCEDURE — 45380 COLONOSCOPY AND BIOPSY: CPT | Mod: 33,59,, | Performed by: INTERNAL MEDICINE

## 2025-03-13 PROCEDURE — 27201089 HC SNARE, DISP (ANY): Performed by: INTERNAL MEDICINE

## 2025-03-13 RX ORDER — PROPOFOL 10 MG/ML
VIAL (ML) INTRAVENOUS
Status: DISCONTINUED
Start: 2025-03-13 | End: 2025-03-13 | Stop reason: HOSPADM

## 2025-03-13 RX ORDER — PROPOFOL 10 MG/ML
VIAL (ML) INTRAVENOUS
Status: DISCONTINUED | OUTPATIENT
Start: 2025-03-13 | End: 2025-03-13

## 2025-03-13 RX ORDER — LIDOCAINE HYDROCHLORIDE 20 MG/ML
INJECTION, SOLUTION EPIDURAL; INFILTRATION; INTRACAUDAL; PERINEURAL
Status: DISCONTINUED
Start: 2025-03-13 | End: 2025-03-13 | Stop reason: HOSPADM

## 2025-03-13 RX ORDER — SODIUM CHLORIDE 9 MG/ML
INJECTION, SOLUTION INTRAVENOUS CONTINUOUS
Status: DISCONTINUED | OUTPATIENT
Start: 2025-03-13 | End: 2025-03-13 | Stop reason: HOSPADM

## 2025-03-13 RX ORDER — LIDOCAINE HYDROCHLORIDE 20 MG/ML
INJECTION INTRAVENOUS
Status: DISCONTINUED | OUTPATIENT
Start: 2025-03-13 | End: 2025-03-13

## 2025-03-13 RX ADMIN — PROPOFOL 40 MG: 10 INJECTION, EMULSION INTRAVENOUS at 10:03

## 2025-03-13 RX ADMIN — PROPOFOL 30 MG: 10 INJECTION, EMULSION INTRAVENOUS at 11:03

## 2025-03-13 RX ADMIN — PROPOFOL 40 MG: 10 INJECTION, EMULSION INTRAVENOUS at 11:03

## 2025-03-13 RX ADMIN — LIDOCAINE HYDROCHLORIDE 100 MG: 20 INJECTION, SOLUTION INTRAVENOUS at 10:03

## 2025-03-13 RX ADMIN — PROPOFOL 70 MG: 10 INJECTION, EMULSION INTRAVENOUS at 10:03

## 2025-03-13 RX ADMIN — PROPOFOL 30 MG: 10 INJECTION, EMULSION INTRAVENOUS at 10:03

## 2025-03-13 RX ADMIN — SODIUM CHLORIDE: 0.9 INJECTION, SOLUTION INTRAVENOUS at 10:03

## 2025-03-13 NOTE — ANESTHESIA PREPROCEDURE EVALUATION
03/13/2025  Makenna Candelaria is a 65 y.o., female.      Pre-op Assessment    I have reviewed the Patient Summary Reports.     I have reviewed the Nursing Notes. I have reviewed the NPO Status.   I have reviewed the Medications.     Review of Systems  Anesthesia Hx:  No problems with previous Anesthesia                Social:  No Alcohol Use, Non-Smoker       Hematology/Oncology:                        --  Cancer in past history:       Breast              EENT/Dental:  EENT/Dental Normal           Cardiovascular:     Hypertension                                          Pulmonary:  Pulmonary Normal                       Renal/:  Renal/ Normal                 Hepatic/GI:         Taking GLP-1 Agonists Instructed to Hold for 7 Days No Reported GI Symptoms          Musculoskeletal:  Arthritis               Neurological:      Headaches                                 Endocrine:  Endocrine Normal            Psych:   anxiety                 Physical Exam  General: Well nourished, Cooperative, Alert and Oriented    Airway:  Mallampati: II / II  Mouth Opening: Normal  TM Distance: Normal  Tongue: Normal    Dental:  Intact    Chest/Lungs:  Clear to auscultation, Normal Respiratory Rate    Heart:  Rate: Normal  Rhythm: Regular Rhythm  Sounds: Normal        Anesthesia Plan  Type of Anesthesia, risks & benefits discussed:    Anesthesia Type: MAC, Gen Natural Airway  Intra-op Monitoring Plan: Standard ASA Monitors  Informed Consent: Informed consent signed with the Patient and all parties understand the risks and agree with anesthesia plan.  All questions answered.   ASA Score: 2  Day of Surgery Review of History & Physical: H&P Update referred to the surgeon/provider.    Ready For Surgery From Anesthesia Perspective.     .

## 2025-03-13 NOTE — TRANSFER OF CARE
Anesthesia Transfer of Care Note    Patient: Makenna Candelaria    Procedure(s) Performed: Procedure(s) (LRB):  COLONOSCOPY (N/A)    Patient location: GI    Anesthesia Type: general    Transport from OR: Transported from OR on room air with adequate spontaneous ventilation    Post pain: adequate analgesia    Post assessment: no apparent anesthetic complications and tolerated procedure well    Post vital signs: stable    Level of consciousness: sedated    Nausea/Vomiting: no nausea/vomiting    Complications: none    Transfer of care protocol was followed      Last vitals: Visit Vitals  /64 (BP Location: Left arm, Patient Position: Lying)   Pulse 87   Temp 36.3 °C (97.3 °F) (Temporal)   Resp 15   SpO2 (!) 93%   Breastfeeding No

## 2025-03-13 NOTE — PROVATION PATIENT INSTRUCTIONS
Discharge Summary/Instructions after an Endoscopic Procedure  Patient Name: Makenna Candelaria  Patient MRN: [id number]  Patient YOB: 1960  Thursday, March 13, 2025  Kyra Laurent MD  Dear patient,  As a result of recent federal legislation (The Federal Cures Act), you may   receive lab or pathology results from your procedure in your [facility]   account before your physician is able to contact you. Your physician or   their representative will relay the results to you with their   recommendations at their soonest availability.  Thank you,  RESTRICTIONS:  During your procedure today, you received medications for sedation.  These   medications may affect your judgment, balance and coordination.  Therefore,   for 24 hours, you have the following restrictions:   - DO NOT drive a car, operate machinery, make legal/financial decisions,   sign important papers or drink alcohol.    ACTIVITY:  Today: no heavy lifting, straining or running due to procedural   sedation/anesthesia.  The following day: return to full activity including work.  DIET:  Eat and drink normally unless instructed otherwise.     TREATMENT FOR COMMON SIDE EFFECTS:  - Mild abdominal pain, nausea, belching, bloating or excessive gas:  rest,   eat lightly and use a heating pad.  - Sore Throat: treat with throat lozenges and/or gargle with warm salt   water.  - Because air was used during the procedure, expelling large amounts of air   from your rectum or belching is normal.  - If a bowel prep was taken, you may not have a bowel movement for 1-3 days.    This is normal.  SYMPTOMS TO WATCH FOR AND REPORT TO YOUR PHYSICIAN:  1. Abdominal pain or bloating, other than gas cramps.  2. Chest pain.  3. Back pain.  4. Signs of infection such as: chills or fever occurring within 24 hours   after the procedure.  5. Rectal bleeding, which would show as bright red, maroon, or black stools.   (A tablespoon of blood from the rectum is not serious, especially if    hemorrhoids are present.)  6. Vomiting.  7. Weakness or dizziness.  GO DIRECTLY TO THE NEAREST EMERGENCY ROOM IF YOU HAVE ANY OF THE FOLLOWING:      Difficulty breathing              Chills and/or fever over 101 F   Persistent vomiting and/or vomiting blood   Severe abdominal pain   Severe chest pain   Black, tarry stools   Bleeding- more than one tablespoon   Any other symptom or condition that you feel may need urgent attention  Your doctor recommends these additional instructions:  If any biopsies were taken, your doctors clinic will contact you in 1 to 2   weeks with any results.  - Discharge patient to home.   - Resume previous diet.   - Await pathology results.   - Repeat colonoscopy in 1 year for surveillance.  For questions, problems or results please call your physician - Kyra Laurent MD at Work:  [phone].  [facility] can be reached at [phone]     IF A COMPLICATION OR EMERGENCY SITUATION ARISES AND YOU ARE UNABLE TO REACH   YOUR PHYSICIAN - GO DIRECTLY TO THE EMERGENCY ROOM.  Kyra Laurent MD  3/13/2025 11:34:55 AM  This report has been verified and signed electronically.  Dear patient,  As a result of recent federal legislation (The Federal Cures Act), you may   receive lab or pathology results from your procedure in your [facility]   account before your physician is able to contact you. Your physician or   their representative will relay the results to you with their   recommendations at their soonest availability.  Thank you,  PROVATION

## 2025-03-13 NOTE — PLAN OF CARE
Procedure and recovery complete. Awake and alert. No c/o pain or discomfort,.Resp. even and unlabored. Discharge instructions given. Verbalized understanding. No acute distress noted.

## 2025-03-13 NOTE — ANESTHESIA POSTPROCEDURE EVALUATION
Anesthesia Post Evaluation    Patient: Makenna Candelaria    Procedure(s) Performed: Procedure(s) (LRB):  COLONOSCOPY (N/A)    Final Anesthesia Type: general      Patient location during evaluation: PACU  Patient participation: Yes- Able to Participate  Level of consciousness: awake and alert  Post-procedure vital signs: reviewed and stable  Pain management: adequate  Airway patency: patent    PONV status at discharge: No PONV  Anesthetic complications: no      Respiratory status: spontaneous ventilation and room air  Hydration status: euvolemic  Follow-up not needed.              Vitals Value Taken Time   /82 03/13/25 11:57   Temp 36.3 °C (97.3 °F) 03/13/25 11:27   Pulse 72 03/13/25 11:57   Resp 18 03/13/25 11:57   SpO2 98 % 03/13/25 11:57         Event Time   Out of Recovery 12:04:15         Pain/Baylee Score: Baylee Score: 10 (3/13/2025 11:57 AM)

## 2025-03-17 LAB
FINAL PATHOLOGIC DIAGNOSIS: NORMAL
GROSS: NORMAL
Lab: NORMAL

## 2025-04-04 DIAGNOSIS — E66.01 CLASS 2 SEVERE OBESITY DUE TO EXCESS CALORIES WITH SERIOUS COMORBIDITY AND BODY MASS INDEX (BMI) OF 38.0 TO 38.9 IN ADULT: ICD-10-CM

## 2025-04-04 DIAGNOSIS — E66.812 CLASS 2 SEVERE OBESITY DUE TO EXCESS CALORIES WITH SERIOUS COMORBIDITY AND BODY MASS INDEX (BMI) OF 38.0 TO 38.9 IN ADULT: ICD-10-CM

## 2025-04-04 RX ORDER — SEMAGLUTIDE 2.4 MG/.75ML
2.4 INJECTION, SOLUTION SUBCUTANEOUS
Qty: 6 ML | Refills: 0 | Status: SHIPPED | OUTPATIENT
Start: 2025-04-04

## 2025-04-04 NOTE — TELEPHONE ENCOUNTER
Refill Routing Note   Medication(s) are not appropriate for processing by [facility] for the following reason(s):        Required labs outdated  ED/Hospital Visit since last OV with provider    [facility] action(s):  Defer        Medication Therapy Plan: Acute care/admission documentation reviewed. No change in therapy    Extended chart review required: Yes     Appointments  past 12m or future 3m with PCP    Date Provider   Last Visit   12/12/2024 Elizabeth Byrnes MD   Next Visit   4/14/2025 Elizabeth Byrnes MD   ED visits in past 90 days: 1        Note composed:2:56 PM 04/04/2025

## 2025-04-04 NOTE — TELEPHONE ENCOUNTER
No care due was identified.  Health [facility] Embedded Care Due Messages. Reference number: [id number].   4/04/2025 12:10:51 PM CDT

## 2025-04-14 ENCOUNTER — OFFICE VISIT (OUTPATIENT)
Dept: FAMILY MEDICINE | Facility: CLINIC | Age: 65
End: 2025-04-14
Payer: COMMERCIAL

## 2025-04-14 VITALS
HEART RATE: 78 BPM | DIASTOLIC BLOOD PRESSURE: 86 MMHG | WEIGHT: 199.5 LBS | BODY MASS INDEX: 36.71 KG/M2 | SYSTOLIC BLOOD PRESSURE: 140 MMHG | OXYGEN SATURATION: 100 % | HEIGHT: 62 IN

## 2025-04-14 DIAGNOSIS — I10 ESSENTIAL HYPERTENSION: ICD-10-CM

## 2025-04-14 DIAGNOSIS — F32.A DEPRESSION, UNSPECIFIED DEPRESSION TYPE: ICD-10-CM

## 2025-04-14 DIAGNOSIS — G47.33 OSA ON CPAP: ICD-10-CM

## 2025-04-14 DIAGNOSIS — E66.01 SEVERE OBESITY (BMI 35.0-39.9) WITH COMORBIDITY: ICD-10-CM

## 2025-04-14 DIAGNOSIS — Z85.3 HISTORY OF BREAST CANCER: ICD-10-CM

## 2025-04-14 DIAGNOSIS — G43.109 MIGRAINE WITH AURA AND WITHOUT STATUS MIGRAINOSUS, NOT INTRACTABLE: Primary | ICD-10-CM

## 2025-04-14 DIAGNOSIS — Z83.711: ICD-10-CM

## 2025-04-14 PROBLEM — E66.812 CLASS 2 SEVERE OBESITY DUE TO EXCESS CALORIES WITH SERIOUS COMORBIDITY AND BODY MASS INDEX (BMI) OF 38.0 TO 38.9 IN ADULT: Status: RESOLVED | Noted: 2024-02-02 | Resolved: 2025-04-14

## 2025-04-14 PROCEDURE — 99214 OFFICE O/P EST MOD 30 MIN: CPT | Mod: S$GLB,,, | Performed by: FAMILY MEDICINE

## 2025-04-14 PROCEDURE — 1159F MED LIST DOCD IN RCRD: CPT | Mod: CPTII,S$GLB,, | Performed by: FAMILY MEDICINE

## 2025-04-14 PROCEDURE — 3079F DIAST BP 80-89 MM HG: CPT | Mod: CPTII,S$GLB,, | Performed by: FAMILY MEDICINE

## 2025-04-14 PROCEDURE — 3077F SYST BP >= 140 MM HG: CPT | Mod: CPTII,S$GLB,, | Performed by: FAMILY MEDICINE

## 2025-04-14 PROCEDURE — 3008F BODY MASS INDEX DOCD: CPT | Mod: CPTII,S$GLB,, | Performed by: FAMILY MEDICINE

## 2025-04-14 PROCEDURE — 4010F ACE/ARB THERAPY RXD/TAKEN: CPT | Mod: CPTII,S$GLB,, | Performed by: FAMILY MEDICINE

## 2025-04-14 PROCEDURE — G2211 COMPLEX E/M VISIT ADD ON: HCPCS | Mod: S$GLB,,, | Performed by: FAMILY MEDICINE

## 2025-04-14 PROCEDURE — 99999 PR PBB SHADOW E&M-EST. PATIENT-LVL IV: CPT | Mod: PBBFAC,,, | Performed by: FAMILY MEDICINE

## 2025-04-14 PROCEDURE — 1160F RVW MEDS BY RX/DR IN RCRD: CPT | Mod: CPTII,S$GLB,, | Performed by: FAMILY MEDICINE

## 2025-04-14 RX ORDER — TRAZODONE HYDROCHLORIDE 50 MG/1
1 TABLET ORAL NIGHTLY
COMMUNITY
Start: 2025-03-17

## 2025-04-14 RX ORDER — TIZANIDINE 4 MG/1
TABLET ORAL
COMMUNITY
Start: 2025-04-04

## 2025-04-14 RX ORDER — ERENUMAB-AOOE 70 MG/ML
INJECTION SUBCUTANEOUS
COMMUNITY

## 2025-04-14 RX ORDER — ZOLPIDEM TARTRATE 5 MG/1
1 TABLET ORAL DAILY
COMMUNITY

## 2025-04-14 RX ORDER — METHOCARBAMOL 750 MG/1
TABLET, FILM COATED ORAL
COMMUNITY
Start: 2025-03-21

## 2025-04-14 RX ORDER — AMITRIPTYLINE HYDROCHLORIDE 75 MG/1
75 TABLET ORAL NIGHTLY
COMMUNITY
Start: 2025-02-08

## 2025-04-14 NOTE — PROGRESS NOTES
Routine Office Visit     Patient Name: Makenna Candelaria    : 1960  MRN: [id number]    Subjective     History of Present Illness    CHIEF COMPLAINT:  Patient presents for a weight check and medication review for semaglutide.    HPI:  Patient reports being weight stable at 199 lbs, down from a previous weight of 225 lbs since starting semaglutide. She expresses concern about recent weight management challenges due to Easter candy and desires increased effectiveness in her weight loss efforts. She admits to minimal exercise and acknowledges the need to increase physical activity. She describes a workplace exercise routine involving running up 4 flights of stairs and performing exercises at the top. Previous issues with bowel movements have since improved. Patient reports not sleeping well and confirms she snores at night. She mentions having a CPAP machine but not using it currently.    Patient denies any new or worsening symptoms.    2024 - 225  2024 - 198  2025 -   Weight goal - 180     Exercise - does 5 flights of stairs (up and down)  every morning. Takes approximately 15 minutes      Work - , collecting taxes. Patient drives and walks    MEDICATIONS:  Patient is on Semaglutide for weight loss.    MEDICAL HISTORY:  She has a history of sleep apnea and multiple colon polyps. Patient had a Pap smear previously.    SURGICAL HISTORY:  Patient recently underwent a colonoscopy where multiple polyps were removed. She also had a recent Pap smear.    TEST RESULTS:  She underwent a Pap smear in December of last year.    IMAGING:  Patient had a mammogram in December of last year.      ROS:  General: no fever, no chills, no fatigue, no weight gain, no weight loss  Eyes: no vision changes, no redness, no discharge  ENT: no ear pain, no nasal congestion, no sore throat  Cardiovascular: no chest pain, no palpitations, no lower extremity edema  Respiratory: no cough, no shortness of  "breath  Gastrointestinal: no abdominal pain, no nausea, no vomiting, no diarrhea, no constipation, no blood in stool  Genitourinary: no dysuria, no hematuria, no frequency  Musculoskeletal: no joint pain, no muscle pain, +body aches  Skin: no rash, no lesion  Neurological: no headache, no dizziness, no numbness, no tingling  Psychiatric: no anxiety, no depression, no sleep difficulty           Objective     BP (!) 140/86 (BP Location: Left arm, Patient Position: Sitting)   Pulse 78   Ht 5' 2" (1.575 m)   Wt 90.5 kg (199 lb 8.3 oz)   SpO2 100%   BMI 36.49 kg/m²   Physical Exam  Constitutional:       Appearance: She is well-developed.   HENT:      Head: Normocephalic and atraumatic.   Eyes:      Conjunctiva/sclera: Conjunctivae normal.      Pupils: Pupils are equal, round, and reactive to light.   Cardiovascular:      Rate and Rhythm: Normal rate and regular rhythm.      Heart sounds: Normal heart sounds. No murmur heard.     No friction rub. No gallop.   Pulmonary:      Effort: No respiratory distress.      Breath sounds: Normal breath sounds.   Abdominal:      General: Bowel sounds are normal. There is no distension.      Palpations: Abdomen is soft.      Tenderness: There is no abdominal tenderness.   Musculoskeletal:         General: Normal range of motion.      Cervical back: Normal range of motion and neck supple.   Lymphadenopathy:      Cervical: No cervical adenopathy.   Skin:     General: Skin is warm.   Neurological:      Mental Status: She is alert and oriented to person, place, and time.           Assessment     Assessment & Plan        ERIK on CPAP    Instructed the patient to use CPAP machine consistently for sleep apnea management.   Discussed the impact of improved sleep and CPAP use on weight management and addressing nocturnal snoring.   Noted the patient's reports of poor sleep quality and confirmed snoring at night.    Family history of hyperplastic polyp of colon    Noted the patient's history " of multiple polyps removed during a recent colonoscopy.   Discussed increased risk for colon cancer due to multiple polyps.   Planned for more frequent colonoscopies, possibly in 1-2 years, due to the presence of multiple polyps.        Problem List Items Addressed This Visit          Neuro    Migraines - Primary    Overview   Patient is losing headache control on her current dose of topiramate.  She has had improved prevention with Elavil. Patient asked to consider Ubrelvy as her abortive therapy.            Psychiatric    Depression  The current medical regimen is effective;  continue present plan and medications.          Cardiac/Vascular    Essential hypertension  The current medical regimen is effective;  continue present plan and medications.          Endocrine    Severe obesity (BMI 35.0-39.9) with comorbidity   Assessed patient's weight stability on semaglutide, noting a decrease from 225 lbs to 199 lbs.   Continued semaglutide for weight management.   Ordered fasting lab work for Wednesday at 10:15 AM to check thyroid function, cholesterol, and other metabolic parameters.   Scheduled follow up in 6 months for weight check, potentially combining with annual check-up.   Instructed patient to complete fasting lab work before next appointment.   Acknowledged the patient's noncompliance with diet, including consumption of candy.   Recommend implementing lifestyle changes, focusing on diet and exercise.   Patient to start exercising gradually, beginning with 5 minutes of walking daily and increasing to 10-20 minutes, as well as continuing stair climbing exercises at work.   Discussed the impact of exercise on weight management.   Explained the necessity of regular weight checks to maintain medication approval.              This note was generated with the assistance of ambient listening technology. Verbal consent was obtained by the patient and accompanying visitor(s) for the recording of patient appointment to  facilitate this note. I attest to having reviewed and edited the generated note for accuracy, though some syntax or spelling errors may persist. Please contact the author of this note for any clarification.

## 2025-04-25 PROBLEM — E78.49 OTHER HYPERLIPIDEMIA: Status: ACTIVE | Noted: 2020-01-02

## 2025-05-08 ENCOUNTER — PATIENT MESSAGE (OUTPATIENT)
Dept: ADMINISTRATIVE | Facility: HOSPITAL | Age: 65
End: 2025-05-08
Payer: COMMERCIAL

## 2025-06-09 ENCOUNTER — PATIENT MESSAGE (OUTPATIENT)
Dept: ADMINISTRATIVE | Facility: HOSPITAL | Age: 65
End: 2025-06-09
Payer: COMMERCIAL

## 2025-06-11 DIAGNOSIS — E66.01 CLASS 2 SEVERE OBESITY DUE TO EXCESS CALORIES WITH SERIOUS COMORBIDITY AND BODY MASS INDEX (BMI) OF 38.0 TO 38.9 IN ADULT: ICD-10-CM

## 2025-06-11 DIAGNOSIS — E66.812 CLASS 2 SEVERE OBESITY DUE TO EXCESS CALORIES WITH SERIOUS COMORBIDITY AND BODY MASS INDEX (BMI) OF 38.0 TO 38.9 IN ADULT: ICD-10-CM

## 2025-06-11 RX ORDER — SEMAGLUTIDE 2.4 MG/.75ML
2.4 INJECTION, SOLUTION SUBCUTANEOUS
Qty: 6 ML | Refills: 0 | Status: SHIPPED | OUTPATIENT
Start: 2025-06-11

## 2025-06-11 NOTE — TELEPHONE ENCOUNTER
Care Due:                  Date            Visit Type   Department     Provider  --------------------------------------------------------------------------------                                EP -         [facility]                              PRIMARY      / INTERNAL MED  Last Visit: 04-      CARE ([facility])   / JACKELINE Byrnes                               -         [facility]                              PRIMARY      / INTERNAL MED  Next Visit: 10-      CARE ([facility])   / JACKELINE Byrnes                                                            Last  Test          Frequency    Reason                     Performed    Due Date  --------------------------------------------------------------------------------    HBA1C.......  6 months...  semaglutide,.............  06- 12-    Health [facility] Embedded Care Due Messages. Reference number: [id number].   6/11/2025 10:12:26 AM CDT

## 2025-07-28 ENCOUNTER — TELEPHONE (OUTPATIENT)
Dept: FAMILY MEDICINE | Facility: CLINIC | Age: 65
End: 2025-07-28
Payer: COMMERCIAL

## 2025-07-28 NOTE — TELEPHONE ENCOUNTER
Copied from CRM #[id number]. Topic: General Inquiry - Patient Advice  >> Jul 25, 2025  8:20 AM Tiffany wrote:  Type: Patient Call Back    Who called Self     What is the request in detail: pt is looking to get semaglutide, weight loss, (WEGOVY) change to Zepbound     Can the clinic reply by [facility]? No     Would the patient rather a call back or a response via [facility]?  Call back     Best call back number .[phone]      Additional Information:

## 2025-07-29 ENCOUNTER — OFFICE VISIT (OUTPATIENT)
Dept: FAMILY MEDICINE | Facility: CLINIC | Age: 65
End: 2025-07-29
Payer: COMMERCIAL

## 2025-07-29 VITALS — SYSTOLIC BLOOD PRESSURE: 130 MMHG | DIASTOLIC BLOOD PRESSURE: 80 MMHG

## 2025-07-29 DIAGNOSIS — E66.01 SEVERE OBESITY (BMI 35.0-39.9) WITH COMORBIDITY: Primary | ICD-10-CM

## 2025-07-29 DIAGNOSIS — I10 ESSENTIAL HYPERTENSION: ICD-10-CM

## 2025-07-29 DIAGNOSIS — E78.49 OTHER HYPERLIPIDEMIA: ICD-10-CM

## 2025-07-29 PROCEDURE — 1160F RVW MEDS BY RX/DR IN RCRD: CPT | Mod: CPTII,95,, | Performed by: FAMILY MEDICINE

## 2025-07-29 PROCEDURE — 98005 SYNCH AUDIO-VIDEO EST LOW 20: CPT | Mod: 95,,, | Performed by: FAMILY MEDICINE

## 2025-07-29 PROCEDURE — 4010F ACE/ARB THERAPY RXD/TAKEN: CPT | Mod: CPTII,95,, | Performed by: FAMILY MEDICINE

## 2025-07-29 PROCEDURE — 3079F DIAST BP 80-89 MM HG: CPT | Mod: CPTII,95,, | Performed by: FAMILY MEDICINE

## 2025-07-29 PROCEDURE — 1159F MED LIST DOCD IN RCRD: CPT | Mod: CPTII,95,, | Performed by: FAMILY MEDICINE

## 2025-07-29 PROCEDURE — 3075F SYST BP GE 130 - 139MM HG: CPT | Mod: CPTII,95,, | Performed by: FAMILY MEDICINE

## 2025-07-29 RX ORDER — TIRZEPATIDE 10 MG/.5ML
10 INJECTION, SOLUTION SUBCUTANEOUS
Qty: 2 ML | Refills: 2 | Status: SHIPPED | OUTPATIENT
Start: 2025-07-29 | End: 2025-07-29 | Stop reason: SDUPTHER

## 2025-07-29 RX ORDER — TIRZEPATIDE 10 MG/.5ML
10 INJECTION, SOLUTION SUBCUTANEOUS
Qty: 2 ML | Refills: 2 | Status: SHIPPED | OUTPATIENT
Start: 2025-07-29

## 2025-07-29 RX ORDER — TIRZEPATIDE 7.5 MG/.5ML
7.5 INJECTION, SOLUTION SUBCUTANEOUS
Qty: 2 ML | Refills: 0 | Status: SHIPPED | OUTPATIENT
Start: 2025-07-29

## 2025-07-29 RX ORDER — TIRZEPATIDE 7.5 MG/.5ML
7.5 INJECTION, SOLUTION SUBCUTANEOUS
Qty: 2 ML | Refills: 0 | Status: SHIPPED | OUTPATIENT
Start: 2025-07-29 | End: 2025-07-29 | Stop reason: SDUPTHER

## 2025-07-29 NOTE — PROGRESS NOTES
Telemedicine Office Visit    Makenna Candelaria    1960  [id number]    Subjective     The patient location is: work   The chief complaint leading to consultation is: weight management   Visit type: Virtual visit with synchronous audio and video  Total time spent with patient: 20 minutes   Each patient to whom he or she provides medical services by telemedicine is:  (1) informed of the relationship between the physician and patient and the respective role of any other health care provider with respect to management of the patient; and (2) notified that he or she may decline to receive medical services by telemedicine and may withdraw from such care at any time.    History of Present Illness    CHIEF COMPLAINT:  Patient presents today for follow up of weight management.    WEIGHT MANAGEMENT:  She reports weight fluctuating between 192-197 lbs. She is currently on Wegovy 2.4 mg. Previously, Wegovy was discontinued due to stock availability issues. She denies any side effects from previous Wegovy treatment and is being considered for Zepbound. She has been active and tries to exercise regularly. She has found her appetite has increased despite trying to make healthier choices in eating.     6/21/2024 - 225  12/12/2024 - 198  4/16/2025 - 199  7/29/2025 - 192  Weight goal - 180       BLOOD PRESSURE AND ASSOCIATED SYMPTOMS:  She reports a history of blood pressure fluctuating in the 130s-140s range but has not checked recently. She experiences occasional dizziness, possibly related to hydration status.    PENDING HEALTH MAINTENANCE:  She acknowledges pending mammogram and upcoming appointment with Dr. Hoskins.      ROS:  General: no fever, no chills, no fatigue, no weight gain, no weight loss, +change in weight  Eyes: no vision changes, no redness, no discharge  ENT: no ear pain, no nasal congestion, no sore throat  Cardiovascular: no chest pain, no palpitations, no lower extremity edema  Respiratory: no cough, no shortness  of breath  Gastrointestinal: no abdominal pain, no nausea, no vomiting, no diarrhea, no constipation, no blood in stool  Genitourinary: no dysuria, no hematuria, no frequency  Musculoskeletal: no joint pain, no muscle pain  Skin: no rash, no lesion  Neurological: no headache, +dizziness, no numbness, no tingling  Psychiatric: no anxiety, no depression, no sleep difficulty           Answers submitted by the patient for this visit:  Review of Systems Questionnaire (Submitted on 7/29/2025)  activity change: No  unexpected weight change: Yes  neck pain: Yes  hearing loss: No  rhinorrhea: No  trouble swallowing: No  eye discharge: No  visual disturbance: No  chest tightness: No  wheezing: No  chest pain: No  palpitations: No  blood in stool: No  constipation: No  vomiting: No  diarrhea: No  polydipsia: No  polyuria: No  difficulty urinating: No  hematuria: No  menstrual problem: No  dysuria: No  joint swelling: No  arthralgias: Yes  headaches: Yes  weakness: No  confusion: No  dysphoric mood: No    /80   Physical Exam  Constitutional:       Appearance: She is well-developed.   HENT:      Head: Normocephalic and atraumatic.   Eyes:      Conjunctiva/sclera: Conjunctivae normal.      Pupils: Pupils are equal, round, and reactive to light.   Cardiovascular:      Rate and Rhythm: Normal rate and regular rhythm.      Heart sounds: Normal heart sounds. No murmur heard.     No friction rub. No gallop.   Pulmonary:      Effort: No respiratory distress.      Breath sounds: Normal breath sounds.   Abdominal:      General: Bowel sounds are normal. There is no distension.      Palpations: Abdomen is soft.      Tenderness: There is no abdominal tenderness.   Musculoskeletal:         General: Normal range of motion.      Cervical back: Normal range of motion and neck supple.   Lymphadenopathy:      Cervical: No cervical adenopathy.   Skin:     General: Skin is warm.   Neurological:      Mental Status: She is alert and oriented to  person, place, and time.             Plan     Assessment & Plan        FOLLOW-UP:   Scheduled follow-up visit in October to assess weight loss progress and medication effectiveness.   Instructed patient to contact office if experiencing any concerning side effects from the new medication.           Problem List Items Addressed This Visit          Cardiac/Vascular    Essential hypertension    Advised checking blood pressure regularly, considering possible dehydration as a contributing factor.   Instructed to report if BP readings are elevated or low.   Recommend keeping a log of blood pressure readings and other health metrics for future appointments.    Other hyperlipidemia  Reviewed labs        Endocrine    Severe obesity (BMI 35.0-39.9) with comorbidity - Primary    Relevant Medications    tirzepatide, weight loss, (ZEPBOUND) 7.5 mg/0.5 mL PnIj    tirzepatide, weight loss, (ZEPBOUND) 10 mg/0.5 mL PnIj   Monitored patient's weight, which fluctuates between 192-197 lbs, noting patient is almost at weight goal.   Switching from Wegovy 2.4 mg to Zepbound due to previous Wegovy stock issues.   Prescribed Zepbound 7.5 mg for 4 weeks, then increasing to 10 mg to manage potential side effects.   Explained dosing equivalence between medications and discussed possible side effects.   Plan is to maintain weight with medication and eventually transition to a maintenance dose.   Educated patient on importance of hydration, especially during summer months, to prevent dizziness.   Recommend increasing water intake, particularly during hot weather.             This note was generated with the assistance of ambient listening technology. Verbal consent was obtained by the patient and accompanying visitor(s) for the recording of patient appointment to facilitate this note. I attest to having reviewed and edited the generated note for accuracy, though some syntax or spelling errors may persist. Please contact the author of this note  for any clarification.

## 2025-09-03 ENCOUNTER — HOSPITAL ENCOUNTER (OUTPATIENT)
Dept: RADIOLOGY | Facility: HOSPITAL | Age: 65
Discharge: HOME | End: 2025-09-03
Attending: NEUROLOGICAL SURGERY
Payer: COMMERCIAL

## 2025-09-03 ENCOUNTER — TELEPHONE (OUTPATIENT)
Dept: FAMILY MEDICINE | Facility: CLINIC | Age: 65
End: 2025-09-03
Payer: COMMERCIAL

## 2025-09-03 DIAGNOSIS — G44.209 TENSION HEADACHE: ICD-10-CM

## 2025-09-03 PROCEDURE — 72050 X-RAY EXAM NECK SPINE 4/5VWS: CPT | Mod: TC,PO

## 2025-09-03 PROCEDURE — 72050 X-RAY EXAM NECK SPINE 4/5VWS: CPT | Mod: 26,,, | Performed by: RADIOLOGY
